# Patient Record
Sex: MALE | Race: WHITE | Employment: FULL TIME | URBAN - METROPOLITAN AREA
[De-identification: names, ages, dates, MRNs, and addresses within clinical notes are randomized per-mention and may not be internally consistent; named-entity substitution may affect disease eponyms.]

---

## 2017-01-01 ENCOUNTER — TELEPHONE (OUTPATIENT)
Dept: PHARMACY | Facility: CLINIC | Age: 47
End: 2017-01-01

## 2017-01-01 ENCOUNTER — CARE COORDINATION (OUTPATIENT)
Dept: ONCOLOGY | Facility: CLINIC | Age: 47
End: 2017-01-01

## 2017-01-01 ENCOUNTER — TELEPHONE (OUTPATIENT)
Dept: ANESTHESIOLOGY | Facility: CLINIC | Age: 47
End: 2017-01-01

## 2017-01-01 ENCOUNTER — OFFICE VISIT (OUTPATIENT)
Dept: NEUROLOGY | Facility: CLINIC | Age: 47
End: 2017-01-01

## 2017-01-01 ENCOUNTER — TELEPHONE (OUTPATIENT)
Dept: NEUROLOGY | Facility: CLINIC | Age: 47
End: 2017-01-01

## 2017-01-01 ENCOUNTER — TELEPHONE (OUTPATIENT)
Dept: ONCOLOGY | Facility: CLINIC | Age: 47
End: 2017-01-01

## 2017-01-01 ENCOUNTER — INFUSION THERAPY VISIT (OUTPATIENT)
Dept: ONCOLOGY | Facility: CLINIC | Age: 47
End: 2017-01-01
Attending: INTERNAL MEDICINE
Payer: COMMERCIAL

## 2017-01-01 ENCOUNTER — TELEPHONE (OUTPATIENT)
Dept: PALLIATIVE CARE | Facility: CLINIC | Age: 47
End: 2017-01-01

## 2017-01-01 ENCOUNTER — APPOINTMENT (OUTPATIENT)
Dept: LAB | Facility: CLINIC | Age: 47
End: 2017-01-01
Attending: INTERNAL MEDICINE
Payer: COMMERCIAL

## 2017-01-01 ENCOUNTER — MEDICAL CORRESPONDENCE (OUTPATIENT)
Dept: HEALTH INFORMATION MANAGEMENT | Facility: CLINIC | Age: 47
End: 2017-01-01

## 2017-01-01 ENCOUNTER — OFFICE VISIT (OUTPATIENT)
Dept: GERIATRICS | Facility: CLINIC | Age: 47
End: 2017-01-01

## 2017-01-01 ENCOUNTER — HOME INFUSION (PRE-WILLOW HOME INFUSION) (OUTPATIENT)
Dept: PHARMACY | Facility: CLINIC | Age: 47
End: 2017-01-01

## 2017-01-01 ENCOUNTER — NURSE TRIAGE (OUTPATIENT)
Dept: NURSING | Facility: CLINIC | Age: 47
End: 2017-01-01

## 2017-01-01 ENCOUNTER — OFFICE VISIT (OUTPATIENT)
Dept: NEUROSURGERY | Facility: CLINIC | Age: 47
End: 2017-01-01
Attending: NEUROLOGICAL SURGERY
Payer: COMMERCIAL

## 2017-01-01 ENCOUNTER — OFFICE VISIT (OUTPATIENT)
Dept: PALLIATIVE CARE | Facility: CLINIC | Age: 47
End: 2017-01-01
Attending: INTERNAL MEDICINE
Payer: COMMERCIAL

## 2017-01-01 ENCOUNTER — HOSPITAL ENCOUNTER (EMERGENCY)
Facility: CLINIC | Age: 47
Discharge: HOME | End: 2017-03-18
Attending: EMERGENCY MEDICINE | Admitting: EMERGENCY MEDICINE
Payer: COMMERCIAL

## 2017-01-01 ENCOUNTER — ONCOLOGY VISIT (OUTPATIENT)
Dept: ONCOLOGY | Facility: CLINIC | Age: 47
End: 2017-01-01
Attending: NURSE PRACTITIONER
Payer: COMMERCIAL

## 2017-01-01 ENCOUNTER — DOCUMENTATION ONLY (OUTPATIENT)
Dept: ONCOLOGY | Facility: CLINIC | Age: 47
End: 2017-01-01

## 2017-01-01 ENCOUNTER — OFFICE VISIT (OUTPATIENT)
Dept: WOUND CARE | Facility: CLINIC | Age: 47
End: 2017-01-01

## 2017-01-01 VITALS
TEMPERATURE: 98.1 F | SYSTOLIC BLOOD PRESSURE: 130 MMHG | DIASTOLIC BLOOD PRESSURE: 85 MMHG | HEIGHT: 72 IN | HEART RATE: 67 BPM | OXYGEN SATURATION: 96 % | WEIGHT: 169 LBS | BODY MASS INDEX: 22.89 KG/M2 | RESPIRATION RATE: 17 BRPM

## 2017-01-01 VITALS
RESPIRATION RATE: 18 BRPM | HEART RATE: 79 BPM | TEMPERATURE: 97.4 F | OXYGEN SATURATION: 97 % | SYSTOLIC BLOOD PRESSURE: 132 MMHG | DIASTOLIC BLOOD PRESSURE: 88 MMHG

## 2017-01-01 VITALS
OXYGEN SATURATION: 99 % | SYSTOLIC BLOOD PRESSURE: 119 MMHG | BODY MASS INDEX: 23.86 KG/M2 | HEART RATE: 80 BPM | WEIGHT: 171.1 LBS | RESPIRATION RATE: 16 BRPM | DIASTOLIC BLOOD PRESSURE: 85 MMHG | TEMPERATURE: 98.1 F

## 2017-01-01 VITALS
OXYGEN SATURATION: 97 % | RESPIRATION RATE: 16 BRPM | HEART RATE: 66 BPM | SYSTOLIC BLOOD PRESSURE: 120 MMHG | HEIGHT: 70 IN | WEIGHT: 170 LBS | TEMPERATURE: 98.4 F | DIASTOLIC BLOOD PRESSURE: 79 MMHG | BODY MASS INDEX: 24.34 KG/M2

## 2017-01-01 VITALS
OXYGEN SATURATION: 100 % | SYSTOLIC BLOOD PRESSURE: 133 MMHG | HEART RATE: 87 BPM | DIASTOLIC BLOOD PRESSURE: 85 MMHG | TEMPERATURE: 97.9 F | RESPIRATION RATE: 16 BRPM | WEIGHT: 176 LBS | BODY MASS INDEX: 24.55 KG/M2

## 2017-01-01 VITALS
SYSTOLIC BLOOD PRESSURE: 141 MMHG | OXYGEN SATURATION: 98 % | DIASTOLIC BLOOD PRESSURE: 94 MMHG | BODY MASS INDEX: 23.91 KG/M2 | TEMPERATURE: 97.8 F | HEART RATE: 92 BPM | WEIGHT: 176.3 LBS | RESPIRATION RATE: 16 BRPM

## 2017-01-01 VITALS
BODY MASS INDEX: 24.39 KG/M2 | HEART RATE: 62 BPM | HEIGHT: 71 IN | SYSTOLIC BLOOD PRESSURE: 112 MMHG | DIASTOLIC BLOOD PRESSURE: 78 MMHG | WEIGHT: 174.2 LBS

## 2017-01-01 VITALS
OXYGEN SATURATION: 97 % | HEART RATE: 66 BPM | SYSTOLIC BLOOD PRESSURE: 120 MMHG | DIASTOLIC BLOOD PRESSURE: 79 MMHG | TEMPERATURE: 98.4 F | BODY MASS INDEX: 24.39 KG/M2 | WEIGHT: 170 LBS | RESPIRATION RATE: 18 BRPM

## 2017-01-01 VITALS
SYSTOLIC BLOOD PRESSURE: 119 MMHG | DIASTOLIC BLOOD PRESSURE: 80 MMHG | WEIGHT: 172 LBS | BODY MASS INDEX: 24.62 KG/M2 | HEIGHT: 70 IN | HEART RATE: 79 BPM

## 2017-01-01 VITALS
BODY MASS INDEX: 23.2 KG/M2 | SYSTOLIC BLOOD PRESSURE: 113 MMHG | HEART RATE: 77 BPM | WEIGHT: 165.7 LBS | DIASTOLIC BLOOD PRESSURE: 78 MMHG | HEIGHT: 71 IN

## 2017-01-01 VITALS
SYSTOLIC BLOOD PRESSURE: 132 MMHG | WEIGHT: 171.8 LBS | HEART RATE: 79 BPM | RESPIRATION RATE: 18 BRPM | OXYGEN SATURATION: 97 % | TEMPERATURE: 97.4 F | DIASTOLIC BLOOD PRESSURE: 88 MMHG | BODY MASS INDEX: 23.97 KG/M2

## 2017-01-01 VITALS
OXYGEN SATURATION: 98 % | SYSTOLIC BLOOD PRESSURE: 122 MMHG | HEART RATE: 79 BPM | RESPIRATION RATE: 16 BRPM | WEIGHT: 170.6 LBS | BODY MASS INDEX: 23.79 KG/M2 | TEMPERATURE: 97.7 F | DIASTOLIC BLOOD PRESSURE: 78 MMHG

## 2017-01-01 VITALS
HEART RATE: 80 BPM | SYSTOLIC BLOOD PRESSURE: 119 MMHG | OXYGEN SATURATION: 99 % | WEIGHT: 171.1 LBS | RESPIRATION RATE: 16 BRPM | TEMPERATURE: 98.1 F | DIASTOLIC BLOOD PRESSURE: 85 MMHG | BODY MASS INDEX: 23.86 KG/M2

## 2017-01-01 VITALS
SYSTOLIC BLOOD PRESSURE: 132 MMHG | WEIGHT: 178 LBS | BODY MASS INDEX: 24.84 KG/M2 | TEMPERATURE: 97.9 F | DIASTOLIC BLOOD PRESSURE: 81 MMHG | OXYGEN SATURATION: 100 % | HEART RATE: 68 BPM | RESPIRATION RATE: 16 BRPM

## 2017-01-01 VITALS
SYSTOLIC BLOOD PRESSURE: 126 MMHG | RESPIRATION RATE: 12 BRPM | HEART RATE: 79 BPM | TEMPERATURE: 97.8 F | OXYGEN SATURATION: 100 % | DIASTOLIC BLOOD PRESSURE: 88 MMHG | HEIGHT: 71 IN

## 2017-01-01 VITALS
TEMPERATURE: 98.2 F | BODY MASS INDEX: 24.1 KG/M2 | DIASTOLIC BLOOD PRESSURE: 81 MMHG | SYSTOLIC BLOOD PRESSURE: 130 MMHG | RESPIRATION RATE: 16 BRPM | OXYGEN SATURATION: 98 % | WEIGHT: 172.7 LBS | HEART RATE: 69 BPM

## 2017-01-01 VITALS
HEART RATE: 75 BPM | SYSTOLIC BLOOD PRESSURE: 130 MMHG | WEIGHT: 169.7 LBS | HEIGHT: 71 IN | TEMPERATURE: 97.5 F | DIASTOLIC BLOOD PRESSURE: 89 MMHG | BODY MASS INDEX: 23.76 KG/M2 | RESPIRATION RATE: 16 BRPM | OXYGEN SATURATION: 98 %

## 2017-01-01 VITALS
BODY MASS INDEX: 24 KG/M2 | DIASTOLIC BLOOD PRESSURE: 83 MMHG | WEIGHT: 172 LBS | HEART RATE: 67 BPM | SYSTOLIC BLOOD PRESSURE: 126 MMHG | OXYGEN SATURATION: 97 % | TEMPERATURE: 97.5 F

## 2017-01-01 VITALS
SYSTOLIC BLOOD PRESSURE: 116 MMHG | WEIGHT: 171.3 LBS | HEART RATE: 74 BPM | BODY MASS INDEX: 23.89 KG/M2 | OXYGEN SATURATION: 100 % | DIASTOLIC BLOOD PRESSURE: 79 MMHG | TEMPERATURE: 97.4 F

## 2017-01-01 VITALS
SYSTOLIC BLOOD PRESSURE: 111 MMHG | RESPIRATION RATE: 18 BRPM | WEIGHT: 175 LBS | DIASTOLIC BLOOD PRESSURE: 76 MMHG | OXYGEN SATURATION: 100 % | HEIGHT: 71 IN | TEMPERATURE: 98 F | BODY MASS INDEX: 24.5 KG/M2 | HEART RATE: 74 BPM

## 2017-01-01 DIAGNOSIS — C78.7 SECONDARY MALIGNANT NEOPLASM OF LIVER (H): ICD-10-CM

## 2017-01-01 DIAGNOSIS — C78.00 MALIGNANT NEOPLASM METASTATIC TO LUNG, UNSPECIFIED LATERALITY (H): Primary | ICD-10-CM

## 2017-01-01 DIAGNOSIS — Z79.899 ENCOUNTER FOR LONG-TERM (CURRENT) USE OF MEDICATIONS: Primary | ICD-10-CM

## 2017-01-01 DIAGNOSIS — R51.9 NONINTRACTABLE EPISODIC HEADACHE, UNSPECIFIED HEADACHE TYPE: ICD-10-CM

## 2017-01-01 DIAGNOSIS — M25.511 RIGHT SHOULDER PAIN, UNSPECIFIED CHRONICITY: Primary | ICD-10-CM

## 2017-01-01 DIAGNOSIS — C79.31 METASTASIS TO BRAIN (H): ICD-10-CM

## 2017-01-01 DIAGNOSIS — C78.00 MALIGNANT NEOPLASM METASTATIC TO LUNG, UNSPECIFIED LATERALITY (H): ICD-10-CM

## 2017-01-01 DIAGNOSIS — C20 RECTAL CANCER (H): ICD-10-CM

## 2017-01-01 DIAGNOSIS — C78.00 METASTASIS TO LUNG (H): ICD-10-CM

## 2017-01-01 DIAGNOSIS — G40.109 LOCALIZATION-RELATED FOCAL EPILEPSY WITH SIMPLE PARTIAL SEIZURES (H): ICD-10-CM

## 2017-01-01 DIAGNOSIS — R42 VERTIGO: ICD-10-CM

## 2017-01-01 DIAGNOSIS — Z79.899 ENCOUNTER FOR LONG-TERM (CURRENT) USE OF MEDICATIONS: ICD-10-CM

## 2017-01-01 DIAGNOSIS — C79.31 METASTASIS TO BRAIN (H): Primary | ICD-10-CM

## 2017-01-01 DIAGNOSIS — G40.109 LOCALIZATION-RELATED FOCAL EPILEPSY WITH SIMPLE PARTIAL SEIZURES (H): Primary | ICD-10-CM

## 2017-01-01 DIAGNOSIS — M25.511 CHRONIC RIGHT SHOULDER PAIN: Primary | ICD-10-CM

## 2017-01-01 DIAGNOSIS — M25.511 ACUTE PAIN OF RIGHT SHOULDER: Primary | ICD-10-CM

## 2017-01-01 DIAGNOSIS — C78.7 SECONDARY MALIGNANT NEOPLASM OF LIVER (H): Primary | ICD-10-CM

## 2017-01-01 DIAGNOSIS — C20 RECTAL CANCER (H): Primary | ICD-10-CM

## 2017-01-01 DIAGNOSIS — G40.109 SEIZURE DISORDER, FOCAL MOTOR (H): Primary | ICD-10-CM

## 2017-01-01 DIAGNOSIS — Z71.89 OSTOMY NURSE CONSULTATION: Primary | ICD-10-CM

## 2017-01-01 DIAGNOSIS — C78.01 MALIGNANT NEOPLASM METASTATIC TO BOTH LUNGS (H): ICD-10-CM

## 2017-01-01 DIAGNOSIS — C78.02 MALIGNANT NEOPLASM METASTATIC TO BOTH LUNGS (H): ICD-10-CM

## 2017-01-01 DIAGNOSIS — Z71.89 OSTOMY NURSE CONSULTATION: ICD-10-CM

## 2017-01-01 DIAGNOSIS — G89.29 CHRONIC RIGHT SHOULDER PAIN: Primary | ICD-10-CM

## 2017-01-01 LAB
ALBUMIN SERPL-MCNC: 3.4 G/DL (ref 3.4–5)
ALBUMIN SERPL-MCNC: 3.5 G/DL (ref 3.4–5)
ALBUMIN SERPL-MCNC: 3.6 G/DL (ref 3.4–5)
ALBUMIN SERPL-MCNC: 3.6 G/DL (ref 3.4–5)
ALBUMIN SERPL-MCNC: 3.7 G/DL (ref 3.4–5)
ALBUMIN SERPL-MCNC: 3.8 G/DL (ref 3.4–5)
ALBUMIN SERPL-MCNC: 3.9 G/DL (ref 3.4–5)
ALBUMIN SERPL-MCNC: 3.9 G/DL (ref 3.4–5)
ALBUMIN SERPL-MCNC: 4 G/DL (ref 3.4–5)
ALBUMIN SERPL-MCNC: 4.1 G/DL (ref 3.4–5)
ALBUMIN UR-MCNC: 10 MG/DL
ALBUMIN UR-MCNC: 30 MG/DL
ALBUMIN UR-MCNC: 30 MG/DL
ALBUMIN UR-MCNC: NEGATIVE MG/DL
ALP SERPL-CCNC: 100 U/L (ref 40–150)
ALP SERPL-CCNC: 105 U/L (ref 40–150)
ALP SERPL-CCNC: 105 U/L (ref 40–150)
ALP SERPL-CCNC: 106 U/L (ref 40–150)
ALP SERPL-CCNC: 108 U/L (ref 40–150)
ALP SERPL-CCNC: 110 U/L (ref 40–150)
ALP SERPL-CCNC: 111 U/L (ref 40–150)
ALP SERPL-CCNC: 112 U/L (ref 40–150)
ALP SERPL-CCNC: 113 U/L (ref 40–150)
ALP SERPL-CCNC: 114 U/L (ref 40–150)
ALP SERPL-CCNC: 117 U/L (ref 40–150)
ALP SERPL-CCNC: 117 U/L (ref 40–150)
ALP SERPL-CCNC: 121 U/L (ref 40–150)
ALP SERPL-CCNC: 121 U/L (ref 40–150)
ALP SERPL-CCNC: 123 U/L (ref 40–150)
ALP SERPL-CCNC: 125 U/L (ref 40–150)
ALP SERPL-CCNC: 126 U/L (ref 40–150)
ALP SERPL-CCNC: 149 U/L (ref 40–150)
ALT SERPL W P-5'-P-CCNC: 16 U/L (ref 0–70)
ALT SERPL W P-5'-P-CCNC: 16 U/L (ref 0–70)
ALT SERPL W P-5'-P-CCNC: 18 U/L (ref 0–70)
ALT SERPL W P-5'-P-CCNC: 18 U/L (ref 0–70)
ALT SERPL W P-5'-P-CCNC: 20 U/L (ref 0–70)
ALT SERPL W P-5'-P-CCNC: 22 U/L (ref 0–70)
ALT SERPL W P-5'-P-CCNC: 22 U/L (ref 0–70)
ALT SERPL W P-5'-P-CCNC: 23 U/L (ref 0–70)
ALT SERPL W P-5'-P-CCNC: 30 U/L (ref 0–70)
ALT SERPL W P-5'-P-CCNC: 34 U/L (ref 0–70)
ALT SERPL W P-5'-P-CCNC: 40 U/L (ref 0–70)
ALT SERPL W P-5'-P-CCNC: 47 U/L (ref 0–70)
ANION GAP SERPL CALCULATED.3IONS-SCNC: 5 MMOL/L (ref 3–14)
ANION GAP SERPL CALCULATED.3IONS-SCNC: 6 MMOL/L (ref 3–14)
ANION GAP SERPL CALCULATED.3IONS-SCNC: 6 MMOL/L (ref 3–14)
ANION GAP SERPL CALCULATED.3IONS-SCNC: 7 MMOL/L (ref 3–14)
ANION GAP SERPL CALCULATED.3IONS-SCNC: 8 MMOL/L (ref 3–14)
ANION GAP SERPL CALCULATED.3IONS-SCNC: 9 MMOL/L (ref 3–14)
ANISOCYTOSIS BLD QL SMEAR: SLIGHT
AST SERPL W P-5'-P-CCNC: 19 U/L (ref 0–45)
AST SERPL W P-5'-P-CCNC: 20 U/L (ref 0–45)
AST SERPL W P-5'-P-CCNC: 21 U/L (ref 0–45)
AST SERPL W P-5'-P-CCNC: 22 U/L (ref 0–45)
AST SERPL W P-5'-P-CCNC: 24 U/L (ref 0–45)
AST SERPL W P-5'-P-CCNC: 24 U/L (ref 0–45)
AST SERPL W P-5'-P-CCNC: 25 U/L (ref 0–45)
AST SERPL W P-5'-P-CCNC: 26 U/L (ref 0–45)
AST SERPL W P-5'-P-CCNC: 27 U/L (ref 0–45)
AST SERPL W P-5'-P-CCNC: 28 U/L (ref 0–45)
AST SERPL W P-5'-P-CCNC: 28 U/L (ref 0–45)
AST SERPL W P-5'-P-CCNC: 30 U/L (ref 0–45)
AST SERPL W P-5'-P-CCNC: 39 U/L (ref 0–45)
AST SERPL W P-5'-P-CCNC: 43 U/L (ref 0–45)
BASOPHILS # BLD AUTO: 0 10E9/L (ref 0–0.2)
BASOPHILS # BLD AUTO: 0.1 10E9/L (ref 0–0.2)
BASOPHILS NFR BLD AUTO: 0 %
BASOPHILS NFR BLD AUTO: 0.3 %
BASOPHILS NFR BLD AUTO: 0.3 %
BASOPHILS NFR BLD AUTO: 0.4 %
BASOPHILS NFR BLD AUTO: 0.5 %
BASOPHILS NFR BLD AUTO: 0.6 %
BASOPHILS NFR BLD AUTO: 0.6 %
BASOPHILS NFR BLD AUTO: 0.7 %
BASOPHILS NFR BLD AUTO: 0.7 %
BASOPHILS NFR BLD AUTO: 0.8 %
BASOPHILS NFR BLD AUTO: 0.9 %
BASOPHILS NFR BLD AUTO: 1 %
BILIRUB SERPL-MCNC: 0.4 MG/DL (ref 0.2–1.3)
BILIRUB SERPL-MCNC: 0.5 MG/DL (ref 0.2–1.3)
BILIRUB SERPL-MCNC: 0.6 MG/DL (ref 0.2–1.3)
BILIRUB SERPL-MCNC: 0.7 MG/DL (ref 0.2–1.3)
BILIRUB SERPL-MCNC: 0.8 MG/DL (ref 0.2–1.3)
BILIRUB SERPL-MCNC: 0.9 MG/DL (ref 0.2–1.3)
BILIRUB SERPL-MCNC: 1.1 MG/DL (ref 0.2–1.3)
BILIRUB SERPL-MCNC: 1.4 MG/DL (ref 0.2–1.3)
BILIRUB SERPL-MCNC: 1.5 MG/DL (ref 0.2–1.3)
BUN SERPL-MCNC: 10 MG/DL (ref 7–30)
BUN SERPL-MCNC: 12 MG/DL (ref 7–30)
BUN SERPL-MCNC: 13 MG/DL (ref 7–30)
BUN SERPL-MCNC: 14 MG/DL (ref 7–30)
BUN SERPL-MCNC: 14 MG/DL (ref 7–30)
BUN SERPL-MCNC: 16 MG/DL (ref 7–30)
BUN SERPL-MCNC: 17 MG/DL (ref 7–30)
BUN SERPL-MCNC: 18 MG/DL (ref 7–30)
BUN SERPL-MCNC: 19 MG/DL (ref 7–30)
CALCIUM SERPL-MCNC: 8.4 MG/DL (ref 8.5–10.1)
CALCIUM SERPL-MCNC: 8.5 MG/DL (ref 8.5–10.1)
CALCIUM SERPL-MCNC: 8.6 MG/DL (ref 8.5–10.1)
CALCIUM SERPL-MCNC: 8.7 MG/DL (ref 8.5–10.1)
CALCIUM SERPL-MCNC: 8.9 MG/DL (ref 8.5–10.1)
CALCIUM SERPL-MCNC: 8.9 MG/DL (ref 8.5–10.1)
CALCIUM SERPL-MCNC: 9 MG/DL (ref 8.5–10.1)
CALCIUM SERPL-MCNC: 9.1 MG/DL (ref 8.5–10.1)
CALCIUM SERPL-MCNC: 9.1 MG/DL (ref 8.5–10.1)
CEA SERPL-MCNC: 132.9 UG/L (ref 0–2.5)
CEA SERPL-MCNC: 179.1 UG/L (ref 0–2.5)
CEA SERPL-MCNC: 2 UG/L (ref 0–2.5)
CEA SERPL-MCNC: 2.2 UG/L (ref 0–2.5)
CEA SERPL-MCNC: 4.1 UG/L (ref 0–2.5)
CHLORIDE SERPL-SCNC: 101 MMOL/L (ref 94–109)
CHLORIDE SERPL-SCNC: 102 MMOL/L (ref 94–109)
CHLORIDE SERPL-SCNC: 102 MMOL/L (ref 94–109)
CHLORIDE SERPL-SCNC: 103 MMOL/L (ref 94–109)
CHLORIDE SERPL-SCNC: 104 MMOL/L (ref 94–109)
CHLORIDE SERPL-SCNC: 105 MMOL/L (ref 94–109)
CHLORIDE SERPL-SCNC: 106 MMOL/L (ref 94–109)
CHLORIDE SERPL-SCNC: 106 MMOL/L (ref 94–109)
CHLORIDE SERPL-SCNC: 107 MMOL/L (ref 94–109)
CHLORIDE SERPL-SCNC: 108 MMOL/L (ref 94–109)
CHLORIDE SERPL-SCNC: 108 MMOL/L (ref 94–109)
CO2 SERPL-SCNC: 25 MMOL/L (ref 20–32)
CO2 SERPL-SCNC: 26 MMOL/L (ref 20–32)
CO2 SERPL-SCNC: 27 MMOL/L (ref 20–32)
CO2 SERPL-SCNC: 28 MMOL/L (ref 20–32)
CO2 SERPL-SCNC: 29 MMOL/L (ref 20–32)
CREAT SERPL-MCNC: 0.73 MG/DL (ref 0.66–1.25)
CREAT SERPL-MCNC: 0.73 MG/DL (ref 0.66–1.25)
CREAT SERPL-MCNC: 0.78 MG/DL (ref 0.66–1.25)
CREAT SERPL-MCNC: 0.78 MG/DL (ref 0.66–1.25)
CREAT SERPL-MCNC: 0.81 MG/DL (ref 0.66–1.25)
CREAT SERPL-MCNC: 0.81 MG/DL (ref 0.66–1.25)
CREAT SERPL-MCNC: 0.82 MG/DL (ref 0.66–1.25)
CREAT SERPL-MCNC: 0.85 MG/DL (ref 0.66–1.25)
CREAT SERPL-MCNC: 0.86 MG/DL (ref 0.66–1.25)
CREAT SERPL-MCNC: 0.87 MG/DL (ref 0.66–1.25)
CREAT SERPL-MCNC: 0.9 MG/DL (ref 0.66–1.25)
CREAT SERPL-MCNC: 0.91 MG/DL (ref 0.66–1.25)
CREAT SERPL-MCNC: 0.92 MG/DL (ref 0.66–1.25)
CREAT SERPL-MCNC: 0.93 MG/DL (ref 0.66–1.25)
CREAT SERPL-MCNC: 0.96 MG/DL (ref 0.66–1.25)
CREAT SERPL-MCNC: 0.98 MG/DL (ref 0.66–1.25)
CREAT SERPL-MCNC: 0.98 MG/DL (ref 0.66–1.25)
CREAT SERPL-MCNC: 1 MG/DL (ref 0.66–1.25)
DESMETHYLLACOSAMIDE: 1.8
DESMETHYLLACOSAMIDE: 2.2
DIFFERENTIAL METHOD BLD: ABNORMAL
DIFFERENTIAL METHOD BLD: NORMAL
EOSINOPHIL # BLD AUTO: 0 10E9/L (ref 0–0.7)
EOSINOPHIL # BLD AUTO: 0.1 10E9/L (ref 0–0.7)
EOSINOPHIL # BLD AUTO: 0.2 10E9/L (ref 0–0.7)
EOSINOPHIL # BLD AUTO: 0.2 10E9/L (ref 0–0.7)
EOSINOPHIL # BLD AUTO: 0.3 10E9/L (ref 0–0.7)
EOSINOPHIL # BLD AUTO: 0.4 10E9/L (ref 0–0.7)
EOSINOPHIL # BLD AUTO: 0.6 10E9/L (ref 0–0.7)
EOSINOPHIL # BLD AUTO: 0.9 10E9/L (ref 0–0.7)
EOSINOPHIL NFR BLD AUTO: 0.3 %
EOSINOPHIL NFR BLD AUTO: 0.6 %
EOSINOPHIL NFR BLD AUTO: 0.9 %
EOSINOPHIL NFR BLD AUTO: 1 %
EOSINOPHIL NFR BLD AUTO: 1.3 %
EOSINOPHIL NFR BLD AUTO: 1.5 %
EOSINOPHIL NFR BLD AUTO: 1.7 %
EOSINOPHIL NFR BLD AUTO: 1.7 %
EOSINOPHIL NFR BLD AUTO: 12.9 %
EOSINOPHIL NFR BLD AUTO: 2.6 %
EOSINOPHIL NFR BLD AUTO: 2.8 %
EOSINOPHIL NFR BLD AUTO: 2.9 %
EOSINOPHIL NFR BLD AUTO: 3 %
EOSINOPHIL NFR BLD AUTO: 3.5 %
EOSINOPHIL NFR BLD AUTO: 3.9 %
EOSINOPHIL NFR BLD AUTO: 4.5 %
EOSINOPHIL NFR BLD AUTO: 5.3 %
EOSINOPHIL NFR BLD AUTO: 7.8 %
ERYTHROCYTE [DISTWIDTH] IN BLOOD BY AUTOMATED COUNT: 11.7 % (ref 10–15)
ERYTHROCYTE [DISTWIDTH] IN BLOOD BY AUTOMATED COUNT: 12.3 % (ref 10–15)
ERYTHROCYTE [DISTWIDTH] IN BLOOD BY AUTOMATED COUNT: 12.6 % (ref 10–15)
ERYTHROCYTE [DISTWIDTH] IN BLOOD BY AUTOMATED COUNT: 12.7 % (ref 10–15)
ERYTHROCYTE [DISTWIDTH] IN BLOOD BY AUTOMATED COUNT: 12.9 % (ref 10–15)
ERYTHROCYTE [DISTWIDTH] IN BLOOD BY AUTOMATED COUNT: 14.6 % (ref 10–15)
ERYTHROCYTE [DISTWIDTH] IN BLOOD BY AUTOMATED COUNT: 15.7 % (ref 10–15)
ERYTHROCYTE [DISTWIDTH] IN BLOOD BY AUTOMATED COUNT: 15.8 % (ref 10–15)
ERYTHROCYTE [DISTWIDTH] IN BLOOD BY AUTOMATED COUNT: 15.8 % (ref 10–15)
ERYTHROCYTE [DISTWIDTH] IN BLOOD BY AUTOMATED COUNT: 15.9 % (ref 10–15)
ERYTHROCYTE [DISTWIDTH] IN BLOOD BY AUTOMATED COUNT: 16.4 % (ref 10–15)
ERYTHROCYTE [DISTWIDTH] IN BLOOD BY AUTOMATED COUNT: 16.4 % (ref 10–15)
ERYTHROCYTE [DISTWIDTH] IN BLOOD BY AUTOMATED COUNT: 16.5 % (ref 10–15)
ERYTHROCYTE [DISTWIDTH] IN BLOOD BY AUTOMATED COUNT: 16.9 % (ref 10–15)
ERYTHROCYTE [DISTWIDTH] IN BLOOD BY AUTOMATED COUNT: 17 % (ref 10–15)
ERYTHROCYTE [DISTWIDTH] IN BLOOD BY AUTOMATED COUNT: 17.1 % (ref 10–15)
ERYTHROCYTE [DISTWIDTH] IN BLOOD BY AUTOMATED COUNT: 17.5 % (ref 10–15)
ERYTHROCYTE [DISTWIDTH] IN BLOOD BY AUTOMATED COUNT: 18 % (ref 10–15)
GFR SERPL CREATININE-BSD FRML MDRD: 80 ML/MIN/1.7M2
GFR SERPL CREATININE-BSD FRML MDRD: 82 ML/MIN/1.7M2
GFR SERPL CREATININE-BSD FRML MDRD: 82 ML/MIN/1.7M2
GFR SERPL CREATININE-BSD FRML MDRD: 84 ML/MIN/1.7M2
GFR SERPL CREATININE-BSD FRML MDRD: 88 ML/MIN/1.7M2
GFR SERPL CREATININE-BSD FRML MDRD: 88 ML/MIN/1.7M2
GFR SERPL CREATININE-BSD FRML MDRD: 90 ML/MIN/1.7M2
GFR SERPL CREATININE-BSD FRML MDRD: >90 ML/MIN/1.7M2
GFR SERPL CREATININE-BSD FRML MDRD: >90 ML/MIN/1.7M2
GFR SERPL CREATININE-BSD FRML MDRD: ABNORMAL ML/MIN/1.7M2
GFR SERPL CREATININE-BSD FRML MDRD: NORMAL ML/MIN/1.7M2
GLUCOSE SERPL-MCNC: 100 MG/DL (ref 70–99)
GLUCOSE SERPL-MCNC: 101 MG/DL (ref 70–99)
GLUCOSE SERPL-MCNC: 79 MG/DL (ref 70–99)
GLUCOSE SERPL-MCNC: 83 MG/DL (ref 70–99)
GLUCOSE SERPL-MCNC: 83 MG/DL (ref 70–99)
GLUCOSE SERPL-MCNC: 84 MG/DL (ref 70–99)
GLUCOSE SERPL-MCNC: 85 MG/DL (ref 70–99)
GLUCOSE SERPL-MCNC: 86 MG/DL (ref 70–99)
GLUCOSE SERPL-MCNC: 88 MG/DL (ref 70–99)
GLUCOSE SERPL-MCNC: 90 MG/DL (ref 70–99)
GLUCOSE SERPL-MCNC: 92 MG/DL (ref 70–99)
GLUCOSE SERPL-MCNC: 94 MG/DL (ref 70–99)
GLUCOSE SERPL-MCNC: 96 MG/DL (ref 70–99)
GLUCOSE SERPL-MCNC: 97 MG/DL (ref 70–99)
GLUCOSE SERPL-MCNC: 99 MG/DL (ref 70–99)
GLUCOSE SERPL-MCNC: 99 MG/DL (ref 70–99)
HCT VFR BLD AUTO: 32 % (ref 40–53)
HCT VFR BLD AUTO: 33.6 % (ref 40–53)
HCT VFR BLD AUTO: 33.8 % (ref 40–53)
HCT VFR BLD AUTO: 35.2 % (ref 40–53)
HCT VFR BLD AUTO: 35.7 % (ref 40–53)
HCT VFR BLD AUTO: 37.5 % (ref 40–53)
HCT VFR BLD AUTO: 38.4 % (ref 40–53)
HCT VFR BLD AUTO: 38.7 % (ref 40–53)
HCT VFR BLD AUTO: 38.7 % (ref 40–53)
HCT VFR BLD AUTO: 39.4 % (ref 40–53)
HCT VFR BLD AUTO: 39.6 % (ref 40–53)
HCT VFR BLD AUTO: 40.2 % (ref 40–53)
HCT VFR BLD AUTO: 40.7 % (ref 40–53)
HCT VFR BLD AUTO: 40.8 % (ref 40–53)
HCT VFR BLD AUTO: 41.2 % (ref 40–53)
HCT VFR BLD AUTO: 41.3 % (ref 40–53)
HCT VFR BLD AUTO: 44.6 % (ref 40–53)
HCT VFR BLD AUTO: 48.5 % (ref 40–53)
HGB BLD-MCNC: 11.3 G/DL (ref 13.3–17.7)
HGB BLD-MCNC: 11.3 G/DL (ref 13.3–17.7)
HGB BLD-MCNC: 11.9 G/DL (ref 13.3–17.7)
HGB BLD-MCNC: 12.5 G/DL (ref 13.3–17.7)
HGB BLD-MCNC: 12.9 G/DL (ref 13.3–17.7)
HGB BLD-MCNC: 12.9 G/DL (ref 13.3–17.7)
HGB BLD-MCNC: 13.4 G/DL (ref 13.3–17.7)
HGB BLD-MCNC: 13.6 G/DL (ref 13.3–17.7)
HGB BLD-MCNC: 13.9 G/DL (ref 13.3–17.7)
HGB BLD-MCNC: 14.1 G/DL (ref 13.3–17.7)
HGB BLD-MCNC: 14.2 G/DL (ref 13.3–17.7)
HGB BLD-MCNC: 14.3 G/DL (ref 13.3–17.7)
HGB BLD-MCNC: 14.5 G/DL (ref 13.3–17.7)
HGB BLD-MCNC: 14.5 G/DL (ref 13.3–17.7)
HGB BLD-MCNC: 14.6 G/DL (ref 13.3–17.7)
HGB BLD-MCNC: 14.6 G/DL (ref 13.3–17.7)
HGB BLD-MCNC: 15.4 G/DL (ref 13.3–17.7)
HGB BLD-MCNC: 17.3 G/DL (ref 13.3–17.7)
IMM GRANULOCYTES # BLD: 0 10E9/L (ref 0–0.4)
IMM GRANULOCYTES # BLD: 0.1 10E9/L (ref 0–0.4)
IMM GRANULOCYTES NFR BLD: 0 %
IMM GRANULOCYTES NFR BLD: 0.2 %
IMM GRANULOCYTES NFR BLD: 0.3 %
IMM GRANULOCYTES NFR BLD: 0.3 %
IMM GRANULOCYTES NFR BLD: 0.4 %
IMM GRANULOCYTES NFR BLD: 0.5 %
IMM GRANULOCYTES NFR BLD: 0.6 %
IMM GRANULOCYTES NFR BLD: 0.7 %
IMM GRANULOCYTES NFR BLD: 0.7 %
IMM GRANULOCYTES NFR BLD: 0.8 %
IMM GRANULOCYTES NFR BLD: 0.8 %
IMM GRANULOCYTES NFR BLD: 0.9 %
IMM GRANULOCYTES NFR BLD: 1.5 %
INTERPRETATION ECG - MUSE: NORMAL
LACOSAMIDE BLD-MCNC: 13.3 UG/ML
LACOSAMIDE BLD-MCNC: 13.7 UG/ML
LEVETIRACETAM SERPL-MCNC: 45.2 UG/ML
LEVETIRACETAM SERPL-MCNC: 80.6 UG/ML
LYMPHOCYTES # BLD AUTO: 0.3 10E9/L (ref 0.8–5.3)
LYMPHOCYTES # BLD AUTO: 0.4 10E9/L (ref 0.8–5.3)
LYMPHOCYTES # BLD AUTO: 0.4 10E9/L (ref 0.8–5.3)
LYMPHOCYTES # BLD AUTO: 0.5 10E9/L (ref 0.8–5.3)
LYMPHOCYTES # BLD AUTO: 0.6 10E9/L (ref 0.8–5.3)
LYMPHOCYTES # BLD AUTO: 0.7 10E9/L (ref 0.8–5.3)
LYMPHOCYTES # BLD AUTO: 0.8 10E9/L (ref 0.8–5.3)
LYMPHOCYTES NFR BLD AUTO: 10.3 %
LYMPHOCYTES NFR BLD AUTO: 14.1 %
LYMPHOCYTES NFR BLD AUTO: 14.6 %
LYMPHOCYTES NFR BLD AUTO: 15.9 %
LYMPHOCYTES NFR BLD AUTO: 16 %
LYMPHOCYTES NFR BLD AUTO: 17.2 %
LYMPHOCYTES NFR BLD AUTO: 21.9 %
LYMPHOCYTES NFR BLD AUTO: 22 %
LYMPHOCYTES NFR BLD AUTO: 22.6 %
LYMPHOCYTES NFR BLD AUTO: 22.8 %
LYMPHOCYTES NFR BLD AUTO: 23 %
LYMPHOCYTES NFR BLD AUTO: 24.6 %
LYMPHOCYTES NFR BLD AUTO: 27.8 %
LYMPHOCYTES NFR BLD AUTO: 31.9 %
LYMPHOCYTES NFR BLD AUTO: 7 %
LYMPHOCYTES NFR BLD AUTO: 8.4 %
LYMPHOCYTES NFR BLD AUTO: 9.3 %
LYMPHOCYTES NFR BLD AUTO: 9.3 %
MACROCYTES BLD QL SMEAR: PRESENT
MCH RBC QN AUTO: 32.9 PG (ref 26.5–33)
MCH RBC QN AUTO: 33 PG (ref 26.5–33)
MCH RBC QN AUTO: 33.6 PG (ref 26.5–33)
MCH RBC QN AUTO: 33.7 PG (ref 26.5–33)
MCH RBC QN AUTO: 34 PG (ref 26.5–33)
MCH RBC QN AUTO: 34.5 PG (ref 26.5–33)
MCH RBC QN AUTO: 34.6 PG (ref 26.5–33)
MCH RBC QN AUTO: 34.7 PG (ref 26.5–33)
MCH RBC QN AUTO: 34.8 PG (ref 26.5–33)
MCH RBC QN AUTO: 35 PG (ref 26.5–33)
MCH RBC QN AUTO: 35.3 PG (ref 26.5–33)
MCH RBC QN AUTO: 35.4 PG (ref 26.5–33)
MCH RBC QN AUTO: 35.5 PG (ref 26.5–33)
MCH RBC QN AUTO: 35.9 PG (ref 26.5–33)
MCH RBC QN AUTO: 35.9 PG (ref 26.5–33)
MCH RBC QN AUTO: 36 PG (ref 26.5–33)
MCH RBC QN AUTO: 36.2 PG (ref 26.5–33)
MCH RBC QN AUTO: 36.2 PG (ref 26.5–33)
MCHC RBC AUTO-ENTMCNC: 33.6 G/DL (ref 31.5–36.5)
MCHC RBC AUTO-ENTMCNC: 34.4 G/DL (ref 31.5–36.5)
MCHC RBC AUTO-ENTMCNC: 34.5 G/DL (ref 31.5–36.5)
MCHC RBC AUTO-ENTMCNC: 34.5 G/DL (ref 31.5–36.5)
MCHC RBC AUTO-ENTMCNC: 34.6 G/DL (ref 31.5–36.5)
MCHC RBC AUTO-ENTMCNC: 34.9 G/DL (ref 31.5–36.5)
MCHC RBC AUTO-ENTMCNC: 35.1 G/DL (ref 31.5–36.5)
MCHC RBC AUTO-ENTMCNC: 35.2 G/DL (ref 31.5–36.5)
MCHC RBC AUTO-ENTMCNC: 35.3 G/DL (ref 31.5–36.5)
MCHC RBC AUTO-ENTMCNC: 35.4 G/DL (ref 31.5–36.5)
MCHC RBC AUTO-ENTMCNC: 35.5 G/DL (ref 31.5–36.5)
MCHC RBC AUTO-ENTMCNC: 35.5 G/DL (ref 31.5–36.5)
MCHC RBC AUTO-ENTMCNC: 35.7 G/DL (ref 31.5–36.5)
MCHC RBC AUTO-ENTMCNC: 35.9 G/DL (ref 31.5–36.5)
MCHC RBC AUTO-ENTMCNC: 36.1 G/DL (ref 31.5–36.5)
MCHC RBC AUTO-ENTMCNC: 36.1 G/DL (ref 31.5–36.5)
MCHC RBC AUTO-ENTMCNC: 36.3 G/DL (ref 31.5–36.5)
MCHC RBC AUTO-ENTMCNC: 36.8 G/DL (ref 31.5–36.5)
MCV RBC AUTO: 101 FL (ref 78–100)
MCV RBC AUTO: 102 FL (ref 78–100)
MCV RBC AUTO: 103 FL (ref 78–100)
MCV RBC AUTO: 104 FL (ref 78–100)
MCV RBC AUTO: 105 FL (ref 78–100)
MCV RBC AUTO: 89 FL (ref 78–100)
MCV RBC AUTO: 93 FL (ref 78–100)
MCV RBC AUTO: 96 FL (ref 78–100)
MCV RBC AUTO: 97 FL (ref 78–100)
MCV RBC AUTO: 98 FL (ref 78–100)
MONOCYTES # BLD AUTO: 0.1 10E9/L (ref 0–1.3)
MONOCYTES # BLD AUTO: 0.2 10E9/L (ref 0–1.3)
MONOCYTES # BLD AUTO: 0.3 10E9/L (ref 0–1.3)
MONOCYTES # BLD AUTO: 0.4 10E9/L (ref 0–1.3)
MONOCYTES # BLD AUTO: 0.5 10E9/L (ref 0–1.3)
MONOCYTES NFR BLD AUTO: 10 %
MONOCYTES NFR BLD AUTO: 14.1 %
MONOCYTES NFR BLD AUTO: 14.7 %
MONOCYTES NFR BLD AUTO: 14.7 %
MONOCYTES NFR BLD AUTO: 17.5 %
MONOCYTES NFR BLD AUTO: 18.6 %
MONOCYTES NFR BLD AUTO: 20.5 %
MONOCYTES NFR BLD AUTO: 26.4 %
MONOCYTES NFR BLD AUTO: 5 %
MONOCYTES NFR BLD AUTO: 5.1 %
MONOCYTES NFR BLD AUTO: 5.7 %
MONOCYTES NFR BLD AUTO: 6.1 %
MONOCYTES NFR BLD AUTO: 6.6 %
MONOCYTES NFR BLD AUTO: 7 %
MONOCYTES NFR BLD AUTO: 7.9 %
MONOCYTES NFR BLD AUTO: 8.1 %
MONOCYTES NFR BLD AUTO: 8.5 %
MONOCYTES NFR BLD AUTO: 9.1 %
NEUTROPHILS # BLD AUTO: 0.6 10E9/L (ref 1.6–8.3)
NEUTROPHILS # BLD AUTO: 1.1 10E9/L (ref 1.6–8.3)
NEUTROPHILS # BLD AUTO: 1.1 10E9/L (ref 1.6–8.3)
NEUTROPHILS # BLD AUTO: 1.3 10E9/L (ref 1.6–8.3)
NEUTROPHILS # BLD AUTO: 1.3 10E9/L (ref 1.6–8.3)
NEUTROPHILS # BLD AUTO: 1.6 10E9/L (ref 1.6–8.3)
NEUTROPHILS # BLD AUTO: 1.7 10E9/L (ref 1.6–8.3)
NEUTROPHILS # BLD AUTO: 1.8 10E9/L (ref 1.6–8.3)
NEUTROPHILS # BLD AUTO: 1.9 10E9/L (ref 1.6–8.3)
NEUTROPHILS # BLD AUTO: 2.3 10E9/L (ref 1.6–8.3)
NEUTROPHILS # BLD AUTO: 2.8 10E9/L (ref 1.6–8.3)
NEUTROPHILS # BLD AUTO: 3.1 10E9/L (ref 1.6–8.3)
NEUTROPHILS # BLD AUTO: 3.8 10E9/L (ref 1.6–8.3)
NEUTROPHILS # BLD AUTO: 5 10E9/L (ref 1.6–8.3)
NEUTROPHILS # BLD AUTO: 5.3 10E9/L (ref 1.6–8.3)
NEUTROPHILS # BLD AUTO: 6.1 10E9/L (ref 1.6–8.3)
NEUTROPHILS # BLD AUTO: 6.5 10E9/L (ref 1.6–8.3)
NEUTROPHILS # BLD AUTO: 6.8 10E9/L (ref 1.6–8.3)
NEUTROPHILS NFR BLD AUTO: 38.2 %
NEUTROPHILS NFR BLD AUTO: 53.4 %
NEUTROPHILS NFR BLD AUTO: 53.5 %
NEUTROPHILS NFR BLD AUTO: 54.5 %
NEUTROPHILS NFR BLD AUTO: 57.2 %
NEUTROPHILS NFR BLD AUTO: 61.4 %
NEUTROPHILS NFR BLD AUTO: 63.9 %
NEUTROPHILS NFR BLD AUTO: 67.6 %
NEUTROPHILS NFR BLD AUTO: 68.6 %
NEUTROPHILS NFR BLD AUTO: 69.2 %
NEUTROPHILS NFR BLD AUTO: 72.7 %
NEUTROPHILS NFR BLD AUTO: 73.4 %
NEUTROPHILS NFR BLD AUTO: 74.3 %
NEUTROPHILS NFR BLD AUTO: 74.6 %
NEUTROPHILS NFR BLD AUTO: 75 %
NEUTROPHILS NFR BLD AUTO: 79.3 %
NEUTROPHILS NFR BLD AUTO: 80.7 %
NEUTROPHILS NFR BLD AUTO: 81 %
NRBC # BLD AUTO: 0 10*3/UL
NRBC BLD AUTO-RTO: 0 /100
OVALOCYTES BLD QL SMEAR: SLIGHT
PLATELET # BLD AUTO: 109 10E9/L (ref 150–450)
PLATELET # BLD AUTO: 116 10E9/L (ref 150–450)
PLATELET # BLD AUTO: 123 10E9/L (ref 150–450)
PLATELET # BLD AUTO: 129 10E9/L (ref 150–450)
PLATELET # BLD AUTO: 142 10E9/L (ref 150–450)
PLATELET # BLD AUTO: 147 10E9/L (ref 150–450)
PLATELET # BLD AUTO: 149 10E9/L (ref 150–450)
PLATELET # BLD AUTO: 151 10E9/L (ref 150–450)
PLATELET # BLD AUTO: 165 10E9/L (ref 150–450)
PLATELET # BLD AUTO: 165 10E9/L (ref 150–450)
PLATELET # BLD AUTO: 167 10E9/L (ref 150–450)
PLATELET # BLD AUTO: 176 10E9/L (ref 150–450)
PLATELET # BLD AUTO: 185 10E9/L (ref 150–450)
PLATELET # BLD AUTO: 188 10E9/L (ref 150–450)
PLATELET # BLD AUTO: 200 10E9/L (ref 150–450)
PLATELET # BLD AUTO: 203 10E9/L (ref 150–450)
PLATELET # BLD AUTO: 245 10E9/L (ref 150–450)
PLATELET # BLD AUTO: 92 10E9/L (ref 150–450)
POIKILOCYTOSIS BLD QL SMEAR: SLIGHT
POTASSIUM SERPL-SCNC: 3.6 MMOL/L (ref 3.4–5.3)
POTASSIUM SERPL-SCNC: 3.8 MMOL/L (ref 3.4–5.3)
POTASSIUM SERPL-SCNC: 3.9 MMOL/L (ref 3.4–5.3)
POTASSIUM SERPL-SCNC: 3.9 MMOL/L (ref 3.4–5.3)
POTASSIUM SERPL-SCNC: 4 MMOL/L (ref 3.4–5.3)
POTASSIUM SERPL-SCNC: 4.1 MMOL/L (ref 3.4–5.3)
POTASSIUM SERPL-SCNC: 4.2 MMOL/L (ref 3.4–5.3)
POTASSIUM SERPL-SCNC: 4.3 MMOL/L (ref 3.4–5.3)
POTASSIUM SERPL-SCNC: 4.5 MMOL/L (ref 3.4–5.3)
PROT SERPL-MCNC: 6.5 G/DL (ref 6.8–8.8)
PROT SERPL-MCNC: 6.8 G/DL (ref 6.8–8.8)
PROT SERPL-MCNC: 6.9 G/DL (ref 6.8–8.8)
PROT SERPL-MCNC: 7.1 G/DL (ref 6.8–8.8)
PROT SERPL-MCNC: 7.2 G/DL (ref 6.8–8.8)
PROT SERPL-MCNC: 7.2 G/DL (ref 6.8–8.8)
PROT SERPL-MCNC: 7.3 G/DL (ref 6.8–8.8)
PROT SERPL-MCNC: 7.4 G/DL (ref 6.8–8.8)
PROT SERPL-MCNC: 7.6 G/DL (ref 6.8–8.8)
PROT SERPL-MCNC: 7.6 G/DL (ref 6.8–8.8)
RBC # BLD AUTO: 3.15 10E12/L (ref 4.4–5.9)
RBC # BLD AUTO: 3.26 10E12/L (ref 4.4–5.9)
RBC # BLD AUTO: 3.36 10E12/L (ref 4.4–5.9)
RBC # BLD AUTO: 3.45 10E12/L (ref 4.4–5.9)
RBC # BLD AUTO: 3.58 10E12/L (ref 4.4–5.9)
RBC # BLD AUTO: 3.7 10E12/L (ref 4.4–5.9)
RBC # BLD AUTO: 3.73 10E12/L (ref 4.4–5.9)
RBC # BLD AUTO: 3.79 10E12/L (ref 4.4–5.9)
RBC # BLD AUTO: 3.94 10E12/L (ref 4.4–5.9)
RBC # BLD AUTO: 4.03 10E12/L (ref 4.4–5.9)
RBC # BLD AUTO: 4.06 10E12/L (ref 4.4–5.9)
RBC # BLD AUTO: 4.2 10E12/L (ref 4.4–5.9)
RBC # BLD AUTO: 4.2 10E12/L (ref 4.4–5.9)
RBC # BLD AUTO: 4.23 10E12/L (ref 4.4–5.9)
RBC # BLD AUTO: 4.26 10E12/L (ref 4.4–5.9)
RBC # BLD AUTO: 4.41 10E12/L (ref 4.4–5.9)
RBC # BLD AUTO: 4.57 10E12/L (ref 4.4–5.9)
RBC # BLD AUTO: 5.24 10E12/L (ref 4.4–5.9)
SODIUM SERPL-SCNC: 132 MMOL/L (ref 133–144)
SODIUM SERPL-SCNC: 134 MMOL/L (ref 133–144)
SODIUM SERPL-SCNC: 135 MMOL/L (ref 133–144)
SODIUM SERPL-SCNC: 136 MMOL/L (ref 133–144)
SODIUM SERPL-SCNC: 137 MMOL/L (ref 133–144)
SODIUM SERPL-SCNC: 137 MMOL/L (ref 133–144)
SODIUM SERPL-SCNC: 138 MMOL/L (ref 133–144)
SODIUM SERPL-SCNC: 138 MMOL/L (ref 133–144)
SODIUM SERPL-SCNC: 139 MMOL/L (ref 133–144)
SODIUM SERPL-SCNC: 140 MMOL/L (ref 133–144)
SODIUM SERPL-SCNC: 141 MMOL/L (ref 133–144)
SODIUM SERPL-SCNC: 142 MMOL/L (ref 133–144)
SODIUM SERPL-SCNC: 142 MMOL/L (ref 133–144)
WBC # BLD AUTO: 1.4 10E9/L (ref 4–11)
WBC # BLD AUTO: 1.6 10E9/L (ref 4–11)
WBC # BLD AUTO: 2 10E9/L (ref 4–11)
WBC # BLD AUTO: 2.3 10E9/L (ref 4–11)
WBC # BLD AUTO: 2.4 10E9/L (ref 4–11)
WBC # BLD AUTO: 2.5 10E9/L (ref 4–11)
WBC # BLD AUTO: 2.8 10E9/L (ref 4–11)
WBC # BLD AUTO: 2.9 10E9/L (ref 4–11)
WBC # BLD AUTO: 3.1 10E9/L (ref 4–11)
WBC # BLD AUTO: 3.3 10E9/L (ref 4–11)
WBC # BLD AUTO: 3.7 10E9/L (ref 4–11)
WBC # BLD AUTO: 4.3 10E9/L (ref 4–11)
WBC # BLD AUTO: 5 10E9/L (ref 4–11)
WBC # BLD AUTO: 6.5 10E9/L (ref 4–11)
WBC # BLD AUTO: 6.8 10E9/L (ref 4–11)
WBC # BLD AUTO: 8.2 10E9/L (ref 4–11)
WBC # BLD AUTO: 8.2 10E9/L (ref 4–11)
WBC # BLD AUTO: 8.4 10E9/L (ref 4–11)

## 2017-01-01 PROCEDURE — 82378 CARCINOEMBRYONIC ANTIGEN: CPT | Performed by: INTERNAL MEDICINE

## 2017-01-01 PROCEDURE — 99215 OFFICE O/P EST HI 40 MIN: CPT | Mod: GC | Performed by: INTERNAL MEDICINE

## 2017-01-01 PROCEDURE — 80053 COMPREHEN METABOLIC PANEL: CPT | Performed by: INTERNAL MEDICINE

## 2017-01-01 PROCEDURE — 85025 COMPLETE CBC W/AUTO DIFF WBC: CPT | Performed by: INTERNAL MEDICINE

## 2017-01-01 PROCEDURE — 25000128 H RX IP 250 OP 636: Mod: ZF | Performed by: INTERNAL MEDICINE

## 2017-01-01 PROCEDURE — 96413 CHEMO IV INFUSION 1 HR: CPT

## 2017-01-01 PROCEDURE — 81003 URINALYSIS AUTO W/O SCOPE: CPT | Performed by: INTERNAL MEDICINE

## 2017-01-01 PROCEDURE — 36415 COLL VENOUS BLD VENIPUNCTURE: CPT | Performed by: INTERNAL MEDICINE

## 2017-01-01 PROCEDURE — 93010 ELECTROCARDIOGRAM REPORT: CPT | Mod: Z6 | Performed by: EMERGENCY MEDICINE

## 2017-01-01 PROCEDURE — 99215 OFFICE O/P EST HI 40 MIN: CPT | Mod: ZP | Performed by: INTERNAL MEDICINE

## 2017-01-01 PROCEDURE — 80299 QUANTITATIVE ASSAY DRUG: CPT | Performed by: PSYCHIATRY & NEUROLOGY

## 2017-01-01 PROCEDURE — 80299 QUANTITATIVE ASSAY DRUG: CPT | Performed by: EMERGENCY MEDICINE

## 2017-01-01 PROCEDURE — 99213 OFFICE O/P EST LOW 20 MIN: CPT

## 2017-01-01 PROCEDURE — 80177 DRUG SCRN QUAN LEVETIRACETAM: CPT | Performed by: PSYCHIATRY & NEUROLOGY

## 2017-01-01 PROCEDURE — 99212 OFFICE O/P EST SF 10 MIN: CPT | Mod: ZF

## 2017-01-01 PROCEDURE — 99284 EMERGENCY DEPT VISIT MOD MDM: CPT | Performed by: EMERGENCY MEDICINE

## 2017-01-01 PROCEDURE — 36591 DRAW BLOOD OFF VENOUS DEVICE: CPT

## 2017-01-01 PROCEDURE — 99212 OFFICE O/P EST SF 10 MIN: CPT

## 2017-01-01 PROCEDURE — 99213 OFFICE O/P EST LOW 20 MIN: CPT | Mod: ZF

## 2017-01-01 PROCEDURE — 40000268 ZZH STATISTIC NO CHARGES: Mod: ZF

## 2017-01-01 PROCEDURE — 80053 COMPREHEN METABOLIC PANEL: CPT | Performed by: PSYCHIATRY & NEUROLOGY

## 2017-01-01 PROCEDURE — 80177 DRUG SCRN QUAN LEVETIRACETAM: CPT | Performed by: EMERGENCY MEDICINE

## 2017-01-01 PROCEDURE — 25000125 ZZHC RX 250: Mod: ZF | Performed by: INTERNAL MEDICINE

## 2017-01-01 PROCEDURE — 25000128 H RX IP 250 OP 636: Mod: JW,ZF | Performed by: INTERNAL MEDICINE

## 2017-01-01 PROCEDURE — 99284 EMERGENCY DEPT VISIT MOD MDM: CPT | Mod: 25 | Performed by: EMERGENCY MEDICINE

## 2017-01-01 PROCEDURE — 85025 COMPLETE CBC W/AUTO DIFF WBC: CPT | Performed by: EMERGENCY MEDICINE

## 2017-01-01 PROCEDURE — 25000128 H RX IP 250 OP 636: Performed by: INTERNAL MEDICINE

## 2017-01-01 PROCEDURE — 99212 OFFICE O/P EST SF 10 MIN: CPT | Mod: 25

## 2017-01-01 PROCEDURE — 25000128 H RX IP 250 OP 636: Mod: ZF | Performed by: NURSE PRACTITIONER

## 2017-01-01 PROCEDURE — 99214 OFFICE O/P EST MOD 30 MIN: CPT | Mod: 25 | Performed by: INTERNAL MEDICINE

## 2017-01-01 PROCEDURE — 25000128 H RX IP 250 OP 636: Performed by: EMERGENCY MEDICINE

## 2017-01-01 PROCEDURE — 25000125 ZZHC RX 250: Performed by: INTERNAL MEDICINE

## 2017-01-01 PROCEDURE — 99214 OFFICE O/P EST MOD 30 MIN: CPT | Mod: ZP | Performed by: NURSE PRACTITIONER

## 2017-01-01 PROCEDURE — 93005 ELECTROCARDIOGRAM TRACING: CPT | Performed by: EMERGENCY MEDICINE

## 2017-01-01 PROCEDURE — 85025 COMPLETE CBC W/AUTO DIFF WBC: CPT | Performed by: PSYCHIATRY & NEUROLOGY

## 2017-01-01 PROCEDURE — 80053 COMPREHEN METABOLIC PANEL: CPT | Performed by: EMERGENCY MEDICINE

## 2017-01-01 RX ORDER — METHYLPREDNISOLONE SODIUM SUCCINATE 125 MG/2ML
125 INJECTION, POWDER, LYOPHILIZED, FOR SOLUTION INTRAMUSCULAR; INTRAVENOUS
Status: CANCELLED
Start: 2017-01-01

## 2017-01-01 RX ORDER — ALBUTEROL SULFATE 0.83 MG/ML
2.5 SOLUTION RESPIRATORY (INHALATION)
Status: CANCELLED | OUTPATIENT
Start: 2017-01-01

## 2017-01-01 RX ORDER — HEPARIN SODIUM (PORCINE) LOCK FLUSH IV SOLN 100 UNIT/ML 100 UNIT/ML
5 SOLUTION INTRAVENOUS ONCE
Status: COMPLETED | OUTPATIENT
Start: 2017-01-01 | End: 2017-01-01

## 2017-01-01 RX ORDER — OXYCODONE HYDROCHLORIDE 5 MG/1
5-10 TABLET ORAL EVERY 4 HOURS PRN
Qty: 180 TABLET | Refills: 0 | Status: SHIPPED | OUTPATIENT
Start: 2017-01-01 | End: 2017-01-01

## 2017-01-01 RX ORDER — LACOSAMIDE 200 MG/1
TABLET ORAL
Qty: 90 TABLET | Refills: 0 | Status: SHIPPED | OUTPATIENT
Start: 2017-01-01 | End: 2017-01-01

## 2017-01-01 RX ORDER — ONDANSETRON 8 MG/1
8 TABLET, ORALLY DISINTEGRATING ORAL EVERY 8 HOURS PRN
Qty: 30 TABLET | Refills: 3 | COMMUNITY
Start: 2017-01-01

## 2017-01-01 RX ORDER — LORAZEPAM 0.5 MG/1
TABLET ORAL
Qty: 60 TABLET | Refills: 3 | Status: CANCELLED | OUTPATIENT
Start: 2017-01-01

## 2017-01-01 RX ORDER — MEPERIDINE HYDROCHLORIDE 25 MG/ML
25 INJECTION INTRAMUSCULAR; INTRAVENOUS; SUBCUTANEOUS EVERY 30 MIN PRN
Status: CANCELLED | OUTPATIENT
Start: 2017-01-01

## 2017-01-01 RX ORDER — EPINEPHRINE 1 MG/ML
0.3 INJECTION INTRAMUSCULAR; INTRAVENOUS; SUBCUTANEOUS EVERY 5 MIN PRN
Status: CANCELLED | OUTPATIENT
Start: 2017-01-01

## 2017-01-01 RX ORDER — LACOSAMIDE 200 MG/1
TABLET ORAL
Qty: 45 TABLET | Refills: 5 | Status: CANCELLED | OUTPATIENT
Start: 2017-01-01

## 2017-01-01 RX ORDER — HEPARIN SODIUM (PORCINE) LOCK FLUSH IV SOLN 100 UNIT/ML 100 UNIT/ML
500 SOLUTION INTRAVENOUS
Status: COMPLETED | OUTPATIENT
Start: 2017-01-01 | End: 2017-01-01

## 2017-01-01 RX ORDER — HEPARIN SODIUM (PORCINE) LOCK FLUSH IV SOLN 100 UNIT/ML 100 UNIT/ML
500 SOLUTION INTRAVENOUS ONCE
Status: COMPLETED | OUTPATIENT
Start: 2017-01-01 | End: 2017-01-01

## 2017-01-01 RX ORDER — DIPHENHYDRAMINE HYDROCHLORIDE 50 MG/ML
50 INJECTION INTRAMUSCULAR; INTRAVENOUS
Status: CANCELLED
Start: 2017-01-01

## 2017-01-01 RX ORDER — SCOLOPAMINE TRANSDERMAL SYSTEM 1 MG/1
PATCH, EXTENDED RELEASE TRANSDERMAL
Qty: 4 PATCH | Refills: 3 | Status: SHIPPED | OUTPATIENT
Start: 2017-01-01 | End: 2017-01-01

## 2017-01-01 RX ORDER — HEPARIN SODIUM (PORCINE) LOCK FLUSH IV SOLN 100 UNIT/ML 100 UNIT/ML
5 SOLUTION INTRAVENOUS
Status: COMPLETED | OUTPATIENT
Start: 2017-01-01 | End: 2017-01-01

## 2017-01-01 RX ORDER — LACOSAMIDE 200 MG/1
TABLET ORAL
Qty: 90 TABLET | Refills: 5 | Status: SHIPPED | OUTPATIENT
Start: 2017-01-01

## 2017-01-01 RX ORDER — ONDANSETRON 8 MG/1
8 TABLET, ORALLY DISINTEGRATING ORAL EVERY 8 HOURS PRN
Qty: 30 TABLET | Refills: 0 | Status: SHIPPED | OUTPATIENT
Start: 2017-01-01 | End: 2017-01-01

## 2017-01-01 RX ORDER — HEPARIN SODIUM (PORCINE) LOCK FLUSH IV SOLN 100 UNIT/ML 100 UNIT/ML
5 SOLUTION INTRAVENOUS EVERY 8 HOURS
Status: DISCONTINUED | OUTPATIENT
Start: 2017-01-01 | End: 2017-01-01 | Stop reason: HOSPADM

## 2017-01-01 RX ORDER — HEPARIN SODIUM (PORCINE) LOCK FLUSH IV SOLN 100 UNIT/ML 100 UNIT/ML
500 SOLUTION INTRAVENOUS EVERY 8 HOURS
Status: DISCONTINUED | OUTPATIENT
Start: 2017-01-01 | End: 2017-01-01 | Stop reason: HOSPADM

## 2017-01-01 RX ORDER — LORAZEPAM 0.5 MG/1
TABLET ORAL
Qty: 60 TABLET | Refills: 5 | Status: SHIPPED | OUTPATIENT
Start: 2017-01-01 | End: 2017-01-01

## 2017-01-01 RX ORDER — SODIUM CHLORIDE 9 MG/ML
1000 INJECTION, SOLUTION INTRAVENOUS CONTINUOUS PRN
Status: CANCELLED
Start: 2017-01-01

## 2017-01-01 RX ORDER — EPINEPHRINE 1 MG/ML
0.3 INJECTION, SOLUTION, CONCENTRATE INTRAVENOUS EVERY 5 MIN PRN
Status: CANCELLED | OUTPATIENT
Start: 2017-01-01

## 2017-01-01 RX ORDER — LACOSAMIDE 200 MG/1
TABLET ORAL
Qty: 30 TABLET | Refills: 5 | Status: SHIPPED | OUTPATIENT
Start: 2017-01-01 | End: 2017-01-01

## 2017-01-01 RX ORDER — EPINEPHRINE 0.3 MG/.3ML
0.3 INJECTION SUBCUTANEOUS EVERY 5 MIN PRN
Status: CANCELLED | OUTPATIENT
Start: 2017-01-01

## 2017-01-01 RX ORDER — HEPARIN SODIUM (PORCINE) LOCK FLUSH IV SOLN 100 UNIT/ML 100 UNIT/ML
5 SOLUTION INTRAVENOUS DAILY PRN
Status: DISCONTINUED | OUTPATIENT
Start: 2017-01-01 | End: 2017-01-01 | Stop reason: HOSPADM

## 2017-01-01 RX ORDER — LEVETIRACETAM 1000 MG/1
TABLET ORAL
Qty: 120 TABLET | Refills: 6 | Status: SHIPPED | OUTPATIENT
Start: 2017-01-01 | End: 2017-01-01

## 2017-01-01 RX ORDER — LORAZEPAM 0.5 MG/1
TABLET ORAL
Qty: 60 TABLET | Refills: 3 | Status: SHIPPED | OUTPATIENT
Start: 2017-01-01 | End: 2017-01-01

## 2017-01-01 RX ORDER — ALBUTEROL SULFATE 90 UG/1
1-2 AEROSOL, METERED RESPIRATORY (INHALATION)
Status: CANCELLED
Start: 2017-01-01

## 2017-01-01 RX ORDER — LACOSAMIDE 200 MG/1
TABLET ORAL
Qty: 30 TABLET | Refills: 3 | Status: SHIPPED | OUTPATIENT
Start: 2017-01-01 | End: 2017-01-01

## 2017-01-01 RX ORDER — OXYCODONE HYDROCHLORIDE 5 MG/1
5-10 TABLET ORAL EVERY 4 HOURS PRN
Qty: 180 TABLET | Refills: 0 | Status: SHIPPED | OUTPATIENT
Start: 2017-01-01

## 2017-01-01 RX ORDER — PHENOBARBITAL 60 MG/1
TABLET ORAL
Qty: 60 TABLET | Refills: 5 | Status: SHIPPED | OUTPATIENT
Start: 2017-01-01

## 2017-01-01 RX ORDER — LORAZEPAM 2 MG/ML
0.5 INJECTION INTRAMUSCULAR EVERY 4 HOURS PRN
Status: CANCELLED
Start: 2017-01-01

## 2017-01-01 RX ORDER — OXYCODONE HYDROCHLORIDE 5 MG/1
5 TABLET ORAL EVERY 4 HOURS PRN
Qty: 30 TABLET | Refills: 0 | Status: SHIPPED | OUTPATIENT
Start: 2017-01-01 | End: 2017-01-01

## 2017-01-01 RX ORDER — MECLIZINE HYDROCHLORIDE 25 MG/1
25 TABLET ORAL PRN
Refills: 0 | COMMUNITY
Start: 2017-01-01

## 2017-01-01 RX ORDER — LORAZEPAM 0.5 MG/1
TABLET ORAL
Qty: 60 TABLET | Refills: 5 | Status: SHIPPED | OUTPATIENT
Start: 2017-01-01

## 2017-01-01 RX ORDER — LACOSAMIDE 200 MG/1
TABLET ORAL
Qty: 270 TABLET | Refills: 3 | Status: CANCELLED | OUTPATIENT
Start: 2017-01-01

## 2017-01-01 RX ORDER — HEPARIN SODIUM (PORCINE) LOCK FLUSH IV SOLN 100 UNIT/ML 100 UNIT/ML
500 SOLUTION INTRAVENOUS EVERY 8 HOURS
Status: CANCELLED
Start: 2017-01-01

## 2017-01-01 RX ORDER — PHENOBARBITAL 15 MG/1
TABLET ORAL
COMMUNITY
Start: 2017-01-01

## 2017-01-01 RX ORDER — ONDANSETRON 8 MG/1
8 TABLET, ORALLY DISINTEGRATING ORAL EVERY 8 HOURS PRN
Qty: 30 TABLET | Refills: 3 | Status: SHIPPED | OUTPATIENT
Start: 2017-01-01 | End: 2017-01-01

## 2017-01-01 RX ORDER — LEVETIRACETAM 1000 MG/1
TABLET ORAL
Qty: 120 TABLET | Refills: 11 | Status: SHIPPED | OUTPATIENT
Start: 2017-01-01

## 2017-01-01 RX ORDER — MECLIZINE HYDROCHLORIDE 25 MG/1
25-50 TABLET ORAL 3 TIMES DAILY PRN
Qty: 30 TABLET | Refills: 0 | Status: SHIPPED | OUTPATIENT
Start: 2017-01-01 | End: 2017-01-01

## 2017-01-01 RX ORDER — LACOSAMIDE 200 MG/1
TABLET ORAL
Qty: 30 TABLET | Refills: 0 | COMMUNITY
Start: 2017-01-01 | End: 2017-01-01

## 2017-01-01 RX ADMIN — BEVACIZUMAB 850 MG: 400 INJECTION, SOLUTION INTRAVENOUS at 14:59

## 2017-01-01 RX ADMIN — SODIUM CHLORIDE, PRESERVATIVE FREE 5 ML: 5 INJECTION INTRAVENOUS at 14:51

## 2017-01-01 RX ADMIN — SODIUM CHLORIDE 250 ML: 9 INJECTION, SOLUTION INTRAVENOUS at 15:01

## 2017-01-01 RX ADMIN — SODIUM CHLORIDE, PRESERVATIVE FREE 5 ML: 5 INJECTION INTRAVENOUS at 15:33

## 2017-01-01 RX ADMIN — SODIUM CHLORIDE 250 ML: 9 INJECTION, SOLUTION INTRAVENOUS at 12:51

## 2017-01-01 RX ADMIN — BEVACIZUMAB 800 MG: 400 INJECTION, SOLUTION INTRAVENOUS at 16:31

## 2017-01-01 RX ADMIN — SODIUM CHLORIDE, PRESERVATIVE FREE 5 ML: 5 INJECTION INTRAVENOUS at 13:29

## 2017-01-01 RX ADMIN — SODIUM CHLORIDE, PRESERVATIVE FREE 5 ML: 5 INJECTION INTRAVENOUS at 14:24

## 2017-01-01 RX ADMIN — SODIUM CHLORIDE 250 ML: 9 INJECTION, SOLUTION INTRAVENOUS at 14:42

## 2017-01-01 RX ADMIN — SODIUM CHLORIDE, PRESERVATIVE FREE 5 ML: 5 INJECTION INTRAVENOUS at 08:05

## 2017-01-01 RX ADMIN — SODIUM CHLORIDE, PRESERVATIVE FREE 5 ML: 5 INJECTION INTRAVENOUS at 16:37

## 2017-01-01 RX ADMIN — SODIUM CHLORIDE, PRESERVATIVE FREE 500 UNITS: 5 INJECTION INTRAVENOUS at 18:25

## 2017-01-01 RX ADMIN — SODIUM CHLORIDE 250 ML: 9 INJECTION, SOLUTION INTRAVENOUS at 16:30

## 2017-01-01 RX ADMIN — SODIUM CHLORIDE, PRESERVATIVE FREE 500 UNITS: 5 INJECTION INTRAVENOUS at 18:20

## 2017-01-01 RX ADMIN — BEVACIZUMAB 800 MG: 400 INJECTION, SOLUTION INTRAVENOUS at 17:49

## 2017-01-01 RX ADMIN — SODIUM CHLORIDE, PRESERVATIVE FREE 5 ML: 5 INJECTION INTRAVENOUS at 16:15

## 2017-01-01 RX ADMIN — SODIUM CHLORIDE 250 ML: 9 INJECTION, SOLUTION INTRAVENOUS at 11:30

## 2017-01-01 RX ADMIN — SODIUM CHLORIDE, PRESERVATIVE FREE 5 ML: 5 INJECTION INTRAVENOUS at 12:48

## 2017-01-01 RX ADMIN — SODIUM CHLORIDE 250 ML: 9 INJECTION, SOLUTION INTRAVENOUS at 17:48

## 2017-01-01 RX ADMIN — BEVACIZUMAB 800 MG: 400 INJECTION, SOLUTION INTRAVENOUS at 17:54

## 2017-01-01 RX ADMIN — BEVACIZUMAB 800 MG: 400 INJECTION, SOLUTION INTRAVENOUS at 15:02

## 2017-01-01 RX ADMIN — BEVACIZUMAB 800 MG: 400 INJECTION, SOLUTION INTRAVENOUS at 18:33

## 2017-01-01 RX ADMIN — SODIUM CHLORIDE, PRESERVATIVE FREE 5 ML: 5 INJECTION INTRAVENOUS at 17:17

## 2017-01-01 RX ADMIN — SODIUM CHLORIDE 500 ML: 9 INJECTION, SOLUTION INTRAVENOUS at 14:08

## 2017-01-01 RX ADMIN — SODIUM CHLORIDE, PRESERVATIVE FREE 500 UNITS: 5 INJECTION INTRAVENOUS at 15:34

## 2017-01-01 RX ADMIN — SODIUM CHLORIDE, PRESERVATIVE FREE 500 UNITS: 5 INJECTION INTRAVENOUS at 12:01

## 2017-01-01 RX ADMIN — SODIUM CHLORIDE, PRESERVATIVE FREE 5 ML: 5 INJECTION INTRAVENOUS at 12:06

## 2017-01-01 RX ADMIN — BEVACIZUMAB 800 MG: 400 INJECTION, SOLUTION INTRAVENOUS at 11:30

## 2017-01-01 RX ADMIN — SODIUM CHLORIDE, PRESERVATIVE FREE 5 ML: 5 INJECTION INTRAVENOUS at 17:02

## 2017-01-01 RX ADMIN — SODIUM CHLORIDE, PRESERVATIVE FREE 5 ML: 5 INJECTION INTRAVENOUS at 12:38

## 2017-01-01 RX ADMIN — SODIUM CHLORIDE, PRESERVATIVE FREE 5 ML: 5 INJECTION INTRAVENOUS at 19:05

## 2017-01-01 RX ADMIN — SODIUM CHLORIDE, PRESERVATIVE FREE 5 ML: 5 INJECTION INTRAVENOUS at 12:01

## 2017-01-01 RX ADMIN — SODIUM CHLORIDE, PRESERVATIVE FREE 500 UNITS: 5 INJECTION INTRAVENOUS at 08:54

## 2017-01-01 RX ADMIN — BEVACIZUMAB 800 MG: 400 INJECTION, SOLUTION INTRAVENOUS at 14:08

## 2017-01-01 RX ADMIN — SODIUM CHLORIDE, PRESERVATIVE FREE 5 ML: 5 INJECTION INTRAVENOUS at 21:07

## 2017-01-01 RX ADMIN — SODIUM CHLORIDE 250 ML: 9 INJECTION, SOLUTION INTRAVENOUS at 17:52

## 2017-01-01 RX ADMIN — SODIUM CHLORIDE, PRESERVATIVE FREE 500 UNITS: 5 INJECTION INTRAVENOUS at 18:21

## 2017-01-01 RX ADMIN — SODIUM CHLORIDE, PRESERVATIVE FREE 5 ML: 5 INJECTION INTRAVENOUS at 10:25

## 2017-01-01 RX ADMIN — BEVACIZUMAB 800 MG: 400 INJECTION, SOLUTION INTRAVENOUS at 12:51

## 2017-01-01 RX ADMIN — SODIUM CHLORIDE, PRESERVATIVE FREE 5 ML: 5 INJECTION INTRAVENOUS at 09:11

## 2017-01-01 ASSESSMENT — ENCOUNTER SYMPTOMS
DIZZINESS: 1
SHORTNESS OF BREATH: 0
NECK STIFFNESS: 0
ARTHRALGIAS: 0
COLOR CHANGE: 0
FEVER: 0
EYE REDNESS: 0
CONFUSION: 0
DIFFICULTY URINATING: 0
HEADACHES: 0
ABDOMINAL PAIN: 0

## 2017-01-01 ASSESSMENT — PAIN SCALES - GENERAL
PAINLEVEL: NO PAIN (0)

## 2017-01-02 NOTE — NURSING NOTE
Patient was already roomed because patient was seeing another provider. Face to face time 0 minutes.

## 2017-01-02 NOTE — Clinical Note
1/2/2017      RE: Deandre Alex  [address]  [place] [address]       HISTORY OF PRESENT ILLNESS:  Deandre Alex is here today in followup of metastatic rectal cancer.  He has disease metastatic to the brain and lungs.  He is here today for ongoing evaluation of his tumor status.  He currently is on active treatment only with Avastin.  At our last evaluation, there were just a couple of tiny lung nodules and minimal evidence of progression in his brain.  He notes since we saw him last that maybe he has had a little bit of progression of the dysfunction in one arm, but that is not a major problem for him.  He continues to have intermittent seizures. They seem to occur more often at times of stress.  This past month actually he has only had a couple.  His appetite remains good.  His energy is good.  He is still working.  He does not have any respiratory symptoms.  He does not have any significant pain.       PHYSICAL EXAMINATION:  On exam today, he is alert and robustly well-appearing.  I did not otherwise examine him.       RESULTS:  I reviewed with the patient and his wife his lab work and imaging studies.  His brain MR shows some minor progression in his resection bed.  In his lungs, there are a couple of new tiny nodules on the right and some scattered old nodules that are unchanged and then on the left is one nodule that has clearly grown from about 6 mm to 13 mm over the last several months.  His electrolytes, renal function, liver enzymes and blood counts are all normal.  A CEA level is 1.5.       ASSESSMENT:  Progressive metastatic rectal cancer.  The patient has very small-volume disease and is essentially asymptomatic except for the neurologic issues.  He has not had Lonsurf or regorafenib as his two remaining standard treatment options.  My recommendation was that we add Lonsurf on to his Avastin.  I talked to Mr. Alex as an alternative, the use of regorafenib. Because that has more toxicity, and he  is doing so well right now that we decided we would save that for later.  His active brain metastases will preclude enrollment in clinical trials for the most part.   I talked with Dr. Blanchard, who felt if need be he might be able to do a little more resection on his brain met, but after some discussion we decided to defer that for now. We could choose not to treat this, but he and his wife think they probably want to go ahead with active treatment.      He might have a business trip which he views as an important opportunity coming up in about 3 weeks and if he is going to be doing that, then he would like to defer starting until then.  Given the relatively indolent nature of his tumor, I do not see much downside to waiting those few weeks.  I went over the details of the Lonsurf with the main risks being cytopenias and associated infection risk.  He and his wife expressed a good understanding.  We will get that set up to start later this week unless he wants to defer it until after his trip.        Total visit time today was greater than 45 minutes, all spent on the above discussion.       Willis Ramon MD

## 2017-01-02 NOTE — NURSING NOTE
Chief Complaint   Patient presents with     Port Draw     Port accessed by RN, labs collected and sent, line flushed with NS and heparin. Vitals taken and pt going to CT now.     Yeny Simmons

## 2017-01-02 NOTE — Clinical Note
1/2/2017       RE: Deandre Alex  [address]  [place] [address]     Dear Colleague,    Thank you for referring your patient, Deandre Alex, to the [facility]. Please see a copy of my visit note below.    CLINIC VISIT       HISTORY OF PRESENT ILLNESS:  Mr. Alex is a 46-year-old male returning to the Neurosurgery Brain Tumor Clinic today for known primary colorectal cancer with brain metastasis.  Mr. Alex has undergone 2 prior resections of his brain tumor, with the last in 2015, followed by 2 Gamma Knife treatments, which were completed also in 2015.  The patient states that when he was last seen in October, he was experiencing what he thought at the time was increased frequency of seizures, as well as decreasing right arm functionality.  Since that time, he has been taking meticulous records of his seizures, and has not determined there to be pattern.  He has recorded frequency per month, which does not demonstrate any trends.  He does feel, however, that his seizures have been worsening in intensity and duration.  More significantly, he has noted continued worsening of his right arm.  He does complain of worsening dexterity, which includes worsening handwriting and ability to draw, which is important to him as an .  He also complains of decreased strength, primarily at the shoulder.  Mr. Alex has not noted any worsening of his right lower extremity symptoms.  He continues to use an ankle brace, which is adequate to help him ambulate.      There were no vitals filed for this visit.  There is no weight on file to calculate BMI.  Data Unavailable     PHYSICAL EXAMINATION:  The patient is alert and appropriately communicative.  The patient's speech is fluent without dysphagia.  He is able to name simple objects.  He can repeat simple sentences.  Pupils are equal, round and reactive to light.  Extraocular movements intact.  Eye fields intact.  Facial sensation intact in the V1-V3  distribution bilaterally.  Facial smile is symmetric.  Hearing is grossly intact.  Soft palate and uvula elevate symmetrically.  Tongue protrudes along the midline.  Shoulder shrug is 5/5 bilaterally.  Sensation to light touch in upper and lower extremities bilaterally.  Strength is 5/5 in bilateral upper extremities, with the exception of 4+/5 right shoulder abduction.  DTRs are normal and symmetric in bilateral upper extremities.  Decreased right hand rapid alternating movements.  Decreased right finger-to-nose testing.  Negative pronator drift.        IMAGING:  Brain MRI dated 01/02/2017 was available for review today.  This study demonstrates a small tumor expansion along the inferior portion of known metastasis.  There is contrast enhancement with surrounding edema.  The surrounding edema was noted on his last scan in October.  The mass does appear to be larger than that noted in October.  No substantial mass effect or other new lesions notable today.      ASSESSMENT:  Primary colorectal cancer with known brain metastasis.  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     PLAN:   1.  The patient is scheduled to follow up regarding known lung metastases with his oncologist, Dr. Ramon, following visiting me in clinic today.  If the patient and Dr. Ramon intend to continue managing his lung metastasis, repeat surgical debaulking would be a reasonable option for his brain metastasis.    I had a long conversation with the patient and his wife that further intervention is not without its risks.  I understand that the patient has several near-term goals to see through, as well as a concern that he may no longer be able to independently change his colostomy pouch.  Though I feel there is not high risk in impacting his goals or inhibiting his ability to maintain his own colostomy, he was informed that there is some degree of risk present.  The patient was understanding of this.      Following the patient's visit  with Dr. Ramon, Dr. Ramon and I will have a conversation regarding the patient's goals of care, and long-term treatment plans will be developed with his wishes in mind.     I saw the patient with the resident.  I have reviewed and edited the resident note and agree with the plan of care.          Dictated by Joel Pimentel MD, Resident         STEFFI LOMAS MD               D: 2017 17:25   T: 2017 07:44   MT: OLE      Name:     TREE HOLM   MRN:      [id number]        Account:      [id number]   :      1970           Service Date: 2017      Document: [id number]

## 2017-01-02 NOTE — NURSING NOTE
BMT Heme Malignancy Rooming Note    Deandre GERI Isai - 1/2/2017 4:59 PM     Chief Complaint   Patient presents with     RECHECK     Patient with Matastasis to the brain here for provider        There were no vitals taken for this visit.     Medications reviewed: Yes    Labs drawn: No    Dressing changed: No     Medications given: No    Staff time:8    Additional information if applicable: Patient offered no complaints    Elvie Ba RN

## 2017-01-02 NOTE — PATIENT INSTRUCTIONS
Contact Numbers    [facility] [place] [phone]  [facility] [place]:  [phone]    Call triage with chills and/or temperature greater than or equal to 100.5, uncontrolled nausea/vomiting, diarrhea, constipation, dizziness, shortness of breath, chest pain, bleeding, unexplained bruising, or any new/concerning symptoms, questions/concerns.     If you are having any concerning symptoms or wish to speak to a provider before your next infusion visit, please call your care coordinator or triage to notify them so we can adequately serve you.       After Hours: [phone]    If after hours, weekends, or holidays, call main hospital  and ask for Oncology doctor on call.           January 2017 Sunday Monday Tuesday Wednesday Thursday Friday Saturday   1     2     [facility] DRAW   11:45 AM   (15 min.)    [facility] DRAW   [facility] Lab Draw     CT CHEST ABDOMEN PELVIS WWO   12:20 PM   (20 min.)   [address] [place] [address] CT     MR BRAIN WWO    1:00 PM   (45 min.)   MR[address] [place] [address] MRI     [facility] ONC INFUSION 60    2:00 PM   (60 min.)   [facility]   [facility]     [facility] RETURN    3:30 PM   (15 min.)   Jewel Blanchard MD   [facility]     [facility] RETURN    4:00 PM   (30 min.)   Willis Ramon MD   [facility] 3     4     5     6     7       8     9     10     11     12     13     [facility] RETURN SEIZURE   10:30 AM   (30 min.)   Erasto Richardson MD   [facility] 14       15     16     17     18     19     20     21       22     23     24     25     26     27     28       29     30     31 February 2017 Sunday Monday Tuesday Wednesday Thursday Friday Saturday                  1     2     3     4       5     6     7     8     9     10     11       12     13     14     15     16     17     18       19     20     21     22     23     24     25       26     27     28                                       Lab Results:  Recent Results (from the past 12 hour(s))   Comprehensive metabolic panel    Collection Time: 01/02/17 12:18 PM   Result Value Ref Range    Sodium 137 133 - 144 mmol/L    Potassium 4.2 3.4 - 5.3 mmol/L    Chloride 105 94 - 109 mmol/L    Carbon Dioxide 27 20 - 32 mmol/L    Anion Gap 5 3 - 14 mmol/L    Glucose 88 70 - 99 mg/dL    Urea Nitrogen 13 7 - 30 mg/dL    Creatinine 0.93 0.66 - 1.25 mg/dL    GFR Estimate 88 >60 mL/min/1.7m2    GFR Estimate If Black >90   GFR Calc   >60 mL/min/1.7m2    Calcium 9.1 8.5 - 10.1 mg/dL    Bilirubin Total 0.8 0.2 - 1.3 mg/dL    Albumin 4.0 3.4 - 5.0 g/dL    Protein Total 7.6 6.8 - 8.8 g/dL    Alkaline Phosphatase 105 40 - 150 U/L    ALT 23 0 - 70 U/L    AST 28 0 - 45 U/L   CBC with platelets differential    Collection Time: 01/02/17 12:18 PM   Result Value Ref Range    WBC 8.4 4.0 - 11.0 10e9/L    RBC Count 5.24 4.4 - 5.9 10e12/L    Hemoglobin 17.3 13.3 - 17.7 g/dL    Hematocrit 48.5 40.0 - 53.0 %    MCV 93 78 - 100 fl    MCH 33.0 26.5 - 33.0 pg    MCHC 35.7 31.5 - 36.5 g/dL    RDW 12.7 10.0 - 15.0 %    Platelet Count 167 150 - 450 10e9/L    Diff Method Automated Method     % Neutrophils 80.7 %    % Lymphocytes 9.3 %    % Monocytes 5.1 %    % Eosinophils 3.9 %    % Basophils 0.6 %    % Immature Granulocytes 0.4 %    Nucleated RBCs 0 0 /100    Absolute Neutrophil 6.8 1.6 - 8.3 10e9/L    Absolute Lymphocytes 0.8 0.8 - 5.3 10e9/L    Absolute Monocytes 0.4 0.0 - 1.3 10e9/L    Absolute Eosinophils 0.3 0.0 - 0.7 10e9/L    Absolute Basophils 0.1 0.0 - 0.2 10e9/L    Abs Immature Granulocytes 0.0 0 - 0.4 10e9/L    Absolute Nucleated RBC 0.0    Creatinine POCT    Collection Time: 01/02/17 12:36 PM   Result Value Ref Range    Creatinine 0.8 0.66 - 1.25 mg/dL    GFR Estimate >90 >60 mL/min/1.7m2    GFR Estimate If Black >90 >60 mL/min/1.7m2   Protein qualitative urine    Collection Time: 01/02/17  2:00 PM   Result Value Ref Range    Protein  Albumin Urine Negative NEG mg/dL

## 2017-01-03 NOTE — PROGRESS NOTES
HISTORY OF PRESENT ILLNESS:  Deandre Alex is here today in followup of metastatic rectal cancer.  He has disease metastatic to the brain and lungs.  He is here today for ongoing evaluation of his tumor status.  He currently is on active treatment only with Avastin.  At our last evaluation, there were just a couple of tiny lung nodules and minimal evidence of progression in his brain.  He notes since we saw him last that maybe he has had a little bit of progression of the dysfunction in one arm, but that is not a major problem for him.  He continues to have intermittent seizures. They seem to occur more often at times of stress.  This past month actually he has only had a couple.  His appetite remains good.  His energy is good.  He is still working.  He does not have any respiratory symptoms.  He does not have any significant pain.       PHYSICAL EXAMINATION:  On exam today, he is alert and robustly well-appearing.  I did not otherwise examine him.       RESULTS:  I reviewed with the patient and his wife his lab work and imaging studies.  His brain MR shows some minor progression in his resection bed.  In his lungs, there are a couple of new tiny nodules on the right and some scattered old nodules that are unchanged and then on the left is one nodule that has clearly grown from about 6 mm to 13 mm over the last several months.  His electrolytes, renal function, liver enzymes and blood counts are all normal.  A CEA level is 1.5.       ASSESSMENT:  Progressive metastatic rectal cancer.  The patient has very small-volume disease and is essentially asymptomatic except for the neurologic issues.  He has not had Lonsurf or regorafenib as his two remaining standard treatment options.  My recommendation was that we add Lonsurf on to his Avastin.  I talked to Mr. Alex as an alternative, the use of regorafenib. Because that has more toxicity, and he is doing so well right now that we decided we would save that for later.  His  active brain metastases will preclude enrollment in clinical trials for the most part.   I talked with Dr. Blanchard, who felt if need be he might be able to do a little more resection on his brain met, but after some discussion we decided to defer that for now. We could choose not to treat this, but he and his wife think they probably want to go ahead with active treatment.      He might have a business trip which he views as an important opportunity coming up in about 3 weeks and if he is going to be doing that, then he would like to defer starting until then.  Given the relatively indolent nature of his tumor, I do not see much downside to waiting those few weeks.  I went over the details of the Lonsurf with the main risks being cytopenias and associated infection risk.  He and his wife expressed a good understanding.  We will get that set up to start later this week unless he wants to defer it until after his trip.        Total visit time today was greater than 45 minutes, all spent on the above discussion.

## 2017-01-03 NOTE — PROGRESS NOTES
CLINIC VISIT       HISTORY OF PRESENT ILLNESS:  Mr. Alex is a 46-year-old male returning to the Neurosurgery Brain Tumor Clinic today for known primary colorectal cancer with brain metastasis.  Mr. Alex has undergone 2 prior resections of his brain tumor, with the last in 2015, followed by 2 Gamma Knife treatments, which were completed also in 2015.  The patient states that when he was last seen in October, he was experiencing what he thought at the time was increased frequency of seizures, as well as decreasing right arm functionality.  Since that time, he has been taking meticulous records of his seizures, and has not determined there to be pattern.  He has recorded frequency per month, which does not demonstrate any trends.  He does feel, however, that his seizures have been worsening in intensity and duration.  More significantly, he has noted continued worsening of his right arm.  He does complain of worsening dexterity, which includes worsening handwriting and ability to draw, which is important to him as an .  He also complains of decreased strength, primarily at the shoulder.  Mr. Alex has not noted any worsening of his right lower extremity symptoms.  He continues to use an ankle brace, which is adequate to help him ambulate.      There were no vitals filed for this visit.  There is no weight on file to calculate BMI.  Data Unavailable     PHYSICAL EXAMINATION:  The patient is alert and appropriately communicative.  The patient's speech is fluent without dysphagia.  He is able to name simple objects.  He can repeat simple sentences.  Pupils are equal, round and reactive to light.  Extraocular movements intact.  Eye fields intact.  Facial sensation intact in the V1-V3 distribution bilaterally.  Facial smile is symmetric.  Hearing is grossly intact.  Soft palate and uvula elevate symmetrically.  Tongue protrudes along the midline.  Shoulder shrug is 5/5 bilaterally.  Sensation to light touch in  upper and lower extremities bilaterally.  Strength is 5/5 in bilateral upper extremities, with the exception of 4+/5 right shoulder abduction.  DTRs are normal and symmetric in bilateral upper extremities.  Decreased right hand rapid alternating movements.  Decreased right finger-to-nose testing.  Negative pronator drift.        IMAGING:  Brain MRI dated 01/02/2017 was available for review today.  This study demonstrates a small tumor expansion along the inferior portion of known metastasis.  There is contrast enhancement with surrounding edema.  The surrounding edema was noted on his last scan in October.  The mass does appear to be larger than that noted in October.  No substantial mass effect or other new lesions notable today.      ASSESSMENT:  Primary colorectal cancer with known brain metastasis.  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     PLAN:   1.  The patient is scheduled to follow up regarding known lung metastases with his oncologist, Dr. Ramon, following visiting me in clinic today.  If the patient and Dr. Ramon intend to continue managing his lung metastasis, repeat surgical debaulking would be a reasonable option for his brain metastasis.    I had a long conversation with the patient and his wife that further intervention is not without its risks.  I understand that the patient has several near-term goals to see through, as well as a concern that he may no longer be able to independently change his colostomy pouch.  Though I feel there is not high risk in impacting his goals or inhibiting his ability to maintain his own colostomy, he was informed that there is some degree of risk present.  The patient was understanding of this.      Following the patient's visit with Dr. Ramon, Dr. Ramon and I will have a conversation regarding the patient's goals of care, and long-term treatment plans will be developed with his wishes in mind.     I saw the patient with the resident.  I have reviewed  and edited the resident note and agree with the plan of care.      Steffi Lomas MD         Dictated by Lui Pimentel MD, Resident         STEFFI LOMAS MD       As dictated by LUI PIMENTEL MD            D: 2017 17:25   T: 2017 07:44   MT: OLE      Name:     TREE HOLM   MRN:      [id number]        Account:      [id number]   :      1970           Service Date: 2017      Document: [id number]

## 2017-01-04 NOTE — TELEPHONE ENCOUNTER
Prior Authorization Approval    Authorization Effective Date: 1/3/2017  Authorization Expiration Date: 7/3/2017  Medication: Lonsurf - Approved  Approved Dose/Quantity: all strengths  Reference #:     Insurance Company: [employer]  Expected CoPay: $0.00     CoPay Card Available:      Foundation Assistance Needed:    Which Pharmacy is filling the prescription (Not needed for infusion/clinic administered): [facility] [address]

## 2017-01-04 NOTE — TELEPHONE ENCOUNTER
"Oral Chemotherapy Monitoring Program    Primary Oncologist: Dr. Ramon  Primary Oncology Clinic: [facility]  Cancer Diagnosis: Rectal Cancer    Drug: Lonsurf 70mg(2x15mg + 2x20mg) PO BID, on D1-D5 + D8-D12 (28 days cycle)  Start Date: As soon as available  Dose is appropriate for patients: 1.98m2 BSA   Expected duration of therapy: Until disease progression or unacceptable toxicity    Drug Interaction Assessment: No new drug interactions.    Lab Monitoring Plan  Monitoring Plan:    C1D1+   CBC, CMP C2D1+ Call, CBC, CMP C3D1+ Call, CBC, CMP C4D1+ Call, CBC, CMP C5D1+ Call, CBC, CMP C6D1+ Call, CBC, CMP   C1D8+  C2D8+  C3D8+  C4D8+  C5D8+  C6D8+    C1D15+ CBC C2D15+ CBC C3D15+ CBC C4D15+ CBC C5D15+ CBC C6D15+ CBC   C1D22+  C2D22+  C3D22+  C4D22+  C5D22+  C6D22+      Labs drawn at [facility]. (Knows to schedule labs on Day 15)    Subjective/Objective:  Deandre Alex is a 46 year old male contacted by phone for an initial visit for oral chemotherapy education.      ORAL CHEMOTHERAPY 1/4/2017   Drug Name Lonsurf (Trifluridine/Tipiracil)   Current Dosage Other   Current Schedule Other   Cycle Details Other       Last PHQ-2 Score on record:   PHQ-2 ( [facility]) 11/21/2016 10/10/2016   Q1: Little interest or pleasure in doing things 0 0   Q2: Feeling down, depressed or hopeless 0 0   PHQ-2 Score 0 0       Patient does not report depression symptoms.      Vitals:  BP:   BP Readings from Last 1 Encounters:   01/02/17 132/88     Wt Readings from Last 1 Encounters:   01/02/17 77.928 kg (171 lb 12.8 oz)     Estimated body surface area is 1.98 meters squared as calculated from the following:    Height as of 10/10/16: 1.803 m (5' 11\").    Weight as of 1/2/17: 77.928 kg (171 lb 12.8 oz).      Labs:  NA      137   1/2/2017   POTASSIUM      4.2   1/2/2017  FAYE      9.1   1/2/2017  MAG      2.2   3/4/2015  PHOS      4.2   3/4/2015  ALBUMIN      4.0   1/2/2017  BUN       13   1/2/2017  CR     0.93   1/2/2017    AST       " 28   1/2/2017  ALT       23   1/2/2017  BILITOTAL      0.8   1/2/2017    WBC      8.4   1/2/2017  HGB     17.3   1/2/2017  PLT      167   1/2/2017  ANEU      6.8   1/2/2017      Assessment:  Patient is appropriate to start therapy.    Plan:  Basic chemotherapy teaching was reviewed with the patient including indication, start date of therapy, dose, administration, adverse effects, missed doses, food and drug interactions, monitoring, side effect management, office contact information, and safe handling. Written materials were provided and all questions answered.    Follow-Up:  Follow up by phone during first week of therapy.     Clinton Bryan  Pharmacy Intern  [facility]

## 2017-01-09 NOTE — PROGRESS NOTES
45 year old male with a brain metastasis from colorectal cancer. Has had surgery x2 and GKRS x2. Has been on avastin for last few months. Overall has been doing well. No new neurologic or other concerns.        3/26/15: Gamma Knife: Dr. Cristobal / Dr. Blanchard: GAMMA KNIFE PROCEDURE NOTE and TREATMENT SUMMARY    LEFT FRONTAL  8 MM COLLIMATOR  20 Gy To 50 % isodose  15 shots     11/23/15: Tumor Board: MRI stable (not worse) Appears to still have tumor burden but on Avastin now.    3/7/16:  Tumor Board:  MRI stable - on Avastin.  Keep appointment for MRI on 4/29/16 with r/c.     5/2/16 - MRI stable;  R/C with MRI in 2-3 months.  Continue Avastin.     7/11/16 - MRI stable; R/C with MRI 3 months;  Continues on Avastin.    10/10/16 -- MRI relatively stable since July with possibly slight progression.  PLAN --   R/C with MRI in 3 months.  May have other options- to discuss with medical oncology.     1/2/17: MRI shows slight serial progression.   Recommendation: repeat surgical debaulking would be a reasonable option for his brain metastasis if patient and Dr. Ramon wish to continue to manage metastasis

## 2017-01-09 NOTE — TELEPHONE ENCOUNTER
Patient called because he ran out of vimpat. He is scheduled to see Dr. Richardson in Neurology clinic 1/27/16. I prescribed 30-day supply of vimpat 300mg BID, which should last until he is seen in clinic. I could not e-prescribe b/c vimpat is controlled substance, so I called it into his pharmacy ([facility] in [place], [place]) & they will put it under Dr. Richardson's LUNA number, which they had on record.     Domenica Mcdonough  G2 Neurology

## 2017-01-09 NOTE — TELEPHONE ENCOUNTER
----- Message from Asif Carbajal CMA sent at 1/9/2017 10:04 AM CST -----  Regarding: refill, beverly pt  Drug Name: vimpat      Dose: 200mg     SIG: Taking 1.5 tabs BID                                 Days Supply Needed: 30 days      Pharmacy: [id number] [place] [address] [place] [address]    Date of Last Appointment: 5/19/15  Next RTC Date: 1/27/17  Has a script already been sent recently: No    Additonal Notes: patient is going to [facility] and will not be back until appt time. Please refill to last until next appt.

## 2017-01-09 NOTE — TELEPHONE ENCOUNTER
Oral Chemotherapy Monitoring Program    Patient called Oral Chemo line to discuss his Lonsurf therapy.    Started: 1/9/16    He wanted to confirm he was taking the right dose of the medication which was 2x15mg and 2x20mg tablets twice daily for a total of 70mg BID. Deandre also reported that he does not have any side effects thus far and will call us if he begins to experience any before he leaves for [facility] this Sunday (1/15). Also notified patient he will needs labs when he returns from his trip.     Clinton Bryan  Pharmacy Intern   [facility]

## 2017-01-25 NOTE — TELEPHONE ENCOUNTER
Oral Chemotherapy Monitoring Program    Placed call to patient in follow up of Lonsurf therapy.    Labs from 1/25/17 show no concerning abnormalities (messaged Nicky Dorantes NP who was not concerned about bili 1.4 or  at this time).    Nicky also requested to see him before cycle 2 with CMP and CBC with differential. I informed the patient and he will schedule these appointments tomorrow (I advised him to try to get appointments next week so he is seen prior to start of cycle 2 on 2/6/17).    Aixa Cesar, Pharmacy Intern  [facility]  [phone]

## 2017-01-25 NOTE — NURSING NOTE
Chief Complaint   Patient presents with     Port Draw     port accessed with power needle for labs, heparin locked and de accessed

## 2017-01-27 NOTE — Clinical Note
2017       RE: Deandre Alex  [address]  [facility] [address]     Dear Colleague,    Thank you for referring your patient, Deandre Alex, to the [facility] NEUROLOGY at [facility]. Please see a copy of my visit note below.    2017          Jewel Blanchard MD   [facility] Neurosurgery   [address] [place] [address]      RE: Deandre Alex   MRN: [id number]   : 1970      Dear Dr. Blanchard:      Mr. 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  He was recently in [place] on a trip in the [place] [facility] and enjoyed quite a bit, as he is an .  He is having about 1-3 seizures per month, usually localized to the right hand.  I have not seen him since .      He says that he tried Ativan 0.5 before he goes to meetings where he tends to get them, and this does not work.  He is on pretty high doses of lacosamide at 300 b.i.d., and he is on levetiracetam at 2000 twice a day.  He has had very good concentrations of the levetiracetam, where a level last obtained in 2015 was 47.2, while the lacosamide level at that time was 8.8.  Therapeutic is not known, but up to 15.        I okayed both medications at the same doses for now.        According to the last MRI, the lesions are growing and he has been started on a new antineoplastic known as Lonsurf.  I will see if there is any interaction with the anticonvulsants, especially with the Vimpat.  We will also see what his levels are today.        PHYSICAL EXAMINATION:  His blood pressure is 113/78.  Pulse is 77.      The patient does show significant weakness of his right leg, which is similar to previous.  His right hand is only mildly weak.      In summary, he continues to have 1-3 simple partial motor seizures per month with no secondary generalization.  He is in dual  therapy.  I have increased his dose of Ativan, which he may use as a rescue medication, to 1 mg every 8 hours, and we may increase his lacosamide dose to a higher level pending on his last level, which was drawn today.  He is on a drug Lonsurf, which is a new antineoplastic agent.  We will check to see if there is any interaction with his anticonvulsant.      Thank you for referring him to Neurology.      Sincerely,      Erasto Richardson MD     D: 2017 09:14   T: 2017 11:22   MT: lianne      Name:     TREE HOLM   MRN:      [id number]        Account:      [id number]   :      1970           Service Date: 2017      Document: [id number]

## 2017-01-27 NOTE — NURSING NOTE
Chief Complaint   Patient presents with     RECHECK     [facility] RETURN SEIZURE     Delia Hernandez MA

## 2017-01-31 NOTE — PROGRESS NOTES
2017          Jewel Blanchard MD   [facility] Neurosurgery   [address] [place] [address]      RE: Deandre Alex   MRN: [id number]   : 1970      Dear Dr. Blanchard:      Mr. 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  He was recently in [facility] [place] a trip [place] the [place] [facility] and enjoyed quite a bit, as he is an .  He is having about 1-3 seizures per month, usually localized to the right hand.  I have not seen him since .      He says that he tried Ativan 0.5 before he goes to meetings where he tends to get them, and this does not work.  He is on pretty high doses of lacosamide at 300 b.i.d., and he is on levetiracetam at 2000 twice a day.  He has had very good concentrations of the levetiracetam, where a level last obtained in 2015 was 47.2, while the lacosamide level at that time was 8.8.  Therapeutic is not known, but up to 15.        I okayed both medications at the same doses for now.        According to the last MRI, the lesions are growing and he has been started on a new antineoplastic known as Lonsurf.  I will see if there is any interaction with the anticonvulsants, especially with the Vimpat.  We will also see what his levels are today.        PHYSICAL EXAMINATION:  His blood pressure is 113/78.  Pulse is 77.      The patient does show significant weakness of his right leg, which is similar to previous.  His right hand is only mildly weak.      In summary, he continues to have 1-3 simple partial motor seizures per month with no secondary generalization.  He is in dual therapy.  I have increased his dose of Ativan, which he may use as a rescue medication, to 1 mg every 8 hours, and we may increase his lacosamide dose to a higher level pending on his last level, which was drawn today.  He is on a drug Lonsurf, which is a new  antineoplastic agent.  We will check to see if there is any interaction with his anticonvulsant.      Thank you for referring him to Neurology.      Sincerely,      MD SABRINA Singer MD             D: 2017 09:14   T: 2017 11:22   MT: lianne      Name:     TREE HOLM   MRN:      [id number]        Account:      [id number]   :      1970           Service Date: 2017      Document: [id number]

## 2017-02-02 NOTE — Clinical Note
2/2/2017       RE: Deandre Alex  [address]  [place] [address]     Dear Colleague,    Thank you for referring your patient, Deandre Alex, to the [facility] CANCER CLINIC. Please see a copy of my visit note below.    Mr. Deandre Alex is a 45-year-old man seen in followup of metastatic rectal cancer involving the brain.  He had long term control on Avastin, but recently progressed within the lung. Lonsurf was added to his regimen.   I am seeing him prior to cycle 2 to assess toxicity.     He was initially diagnosed with primary rectal cancer with metastatic disease involving the liver.  He was treated with chemoradiation with a complete response to his liver lesions.  He then developed lung metastases soon after  finishing his original adjuvant treatment and resection of his primary.  While receiving adjuvant irinotecan, he developed additional metastases in the lung, both of which were resected.  He developed progressive right-sided weakness and numbness and tingling in 09/2004 and was found to have a metastatic brain lesion in the left frontal lobe.  He underwent Gamma Knife and was followed without progression until 11/2014 when he had progression.  He had a craniotomy and tumor resection at that time.  In 02/2015, an MRI showed an increased left parietal lesion.  This was resected and he proceeded on to Gamma Knife.  He had increased seizure frequency and brain MRI in 09/2015 showed a left frontoparietal lesion that was increased.  Surgical resection was deferred due to concerns with worsening neurologic impairment.  He initiated monthly Avastin on 09/23/2015.  After starting Avastin his seizures stopped. Lonsurf initiated on 1/9/17.    Interval history: Deandre has been feeling well. Recently travelled to [place]. Has been a little more fatigued and had a little more nausea while on Lonsurf. Otherwise feels he tolerated it well. With the addition of zofran, the nausea was well controlled. Has ongoing  "right sided partial seizures, sometimes a few/month, sometimes up to several times a month. Usually, this happens when is in a meeting. He has followed with Dr. Richardson recently for this.     Bowels are regular. No coughing or shortness of breath. No chest pain. No palpitations. No fevers/chills.     Current Outpatient Prescriptions   Medication Sig     LORazepam (ATIVAN) 0.5 MG tablet 2 tabs q 8 hrs as needed for seizures     levETIRAcetam 1000 MG TABS Take 2 tabs (2000mg) by mouth twice daily     lacosamide (VIMPAT) 200 MG TABS tablet Take 1.5 tabs (300mg) by mouth twice daily     baclofen (LIORESAL) 10 MG tablet Take 0.5 tablets (5 mg) by mouth 3 times daily (Patient taking differently: Take 5 mg by mouth 2 times daily )     ondansetron (ZOFRAN-ODT) 8 MG ODT tab Take 1 tablet (8 mg) by mouth every 8 hours as needed for nausea     Trifluridine-Tipiracil (LONSURF) 15-6.14 MG tablet Take 2 tablets by mouth 2 times daily Take within 1 hour after morning and evening meals on Days 1 thru 5 and 8 thru 12 of each 28 day cycle     Trifluridine-Tipiracil (LONSURF) 20-8.19 MG tablet Take 2 tablets by mouth 2 times daily within 1 hour after morning and evening meals on Days 1 thru 5 and 8 thru 12 of each 28 day cycle.     Current Facility-Administered Medications   Medication     heparin 100 UNIT/ML injection 5 mL     Facility-Administered Medications Ordered in Other Visits   Medication     labetalol (NORMODYNE,TRANDATE) injection     Exam:  Alert, appears well. Blood pressure 111/76, pulse 74, temperature 98  F (36.7  C), temperature source Oral, resp. rate 18, height 1.803 m (5' 10.98\"), weight 79.379 kg (175 lb), SpO2 100 %.  Oropharynx is moist, no focal lesion. No icterus. Neck supple and without adenopathy. Lungs:CTA. Heart:RRR, no murmur or rub. Abdomen: soft, nontender, BS active. Ostomy intact. Extremities: warm, no edema. Right sided weakness noted. Wearing a leg brace.    Labs:  Results for TREE HOLM (MRN " [id number]) as of 2/2/2017 17:25   Ref. Range 2/2/2017 16:25   Sodium Latest Ref Range: 133-144 mmol/L 141   Potassium Latest Ref Range: 3.4-5.3 mmol/L 3.6   Chloride Latest Ref Range:  mmol/L 105   Carbon Dioxide Latest Ref Range: 20-32 mmol/L 27   Urea Nitrogen Latest Ref Range: 7-30 mg/dL 13   Creatinine Latest Ref Range: 0.66-1.25 mg/dL 0.81   GFR Estimate Latest Ref Range: >60 mL/min/1.7m2 >90...   GFR Estimate If Black Latest Ref Range: >60 mL/min/1.7m2 >90...   Calcium Latest Ref Range: 8.5-10.1 mg/dL 8.5   Anion Gap Latest Ref Range: 3-14 mmol/L 9   Albumin Latest Ref Range: 3.4-5.0 g/dL 3.8   Protein Total Latest Ref Range: 6.8-8.8 g/dL 6.9   Bilirubin Total Latest Ref Range: 0.2-1.3 mg/dL 0.8   Alkaline Phosphatase Latest Ref Range:  U/L 105   ALT Latest Ref Range: 0-70 U/L 30   AST Latest Ref Range: 0-45 U/L 30   Glucose Latest Ref Range: 70-99 mg/dL 79   WBC Latest Ref Range: 4.0-11.0 10e9/L 2.5 (L)   Hemoglobin Latest Ref Range: 13.3-17.7 g/dL 14.6   Hematocrit Latest Ref Range: 40.0-53.0 % 40.2   Platelet Count Latest Ref Range: 150-450 10e9/L 185   RBC Count Latest Ref Range: 4.4-5.9 10e12/L 4.20 (L)   MCV Latest Ref Range:  fl 96   MCH Latest Ref Range: 26.5-33.0 pg 34.8 (H)   MCHC Latest Ref Range: 31.5-36.5 g/dL 36.3   RDW Latest Ref Range: 10.0-15.0 % 16.4 (H)   Diff Method Unknown Automated Method   % Neutrophils Latest Units: % 53.4   % Lymphocytes Latest Units: % 27.8   % Monocytes Latest Units: % 14.7   % Eosinophils Latest Units: % 2.9   % Basophils Latest Units: % 0.4   % Immature Granulocytes Latest Units: % 0.8   Nucleated RBCs Latest Ref Range: 0 /100 0   Absolute Neutrophil Latest Ref Range: 1.6-8.3 10e9/L 1.3 (L)   Absolute Lymphocytes Latest Ref Range: 0.8-5.3 10e9/L 0.7 (L)   Absolute Monocytes Latest Ref Range: 0.0-1.3 10e9/L 0.4   Absolute Eosinophils Latest Ref Range: 0.0-0.7 10e9/L 0.1   Absolute Basophils Latest Ref Range: 0.0-0.2 10e9/L 0.0   Abs Immature  Granulocytes Latest Ref Range: 0-0.4 10e9/L 0.0   Absolute Nucleated RBC Unknown 0.0     Impression/plan:  1. Metastatic rectal cancer involving the brain and lungs. Initiated lonsurf 1/9/17, remains on Avastin 10 mg/kg monthly.  ANC is marginal today. Unsure whether he will have further decline. Will recheck CBCDP on Monday 2/6/16. If ANC has improved, will continue Lonsurf without dose adjustment, but will add Neulasta on day 15.    2. Seizures- continues to have a few to several partial seizures per month. Follows with . Remains on Ativan 1 mg Q 8 hours prn seizure, Vimpat 300 mg bid, levetiracetam 2000 mg bid.    3. Nausea: secondary to Lonsurf. Improved with zofran. Refill sent to [facility].    Addendum: 2/6 ANC is still 1.3. Will hold Lonsurf x 1 week and recheck 2/13/17      Again, thank you for allowing me to participate in the care of your patient.      Sincerely,    CHELY Lorenzo CNP

## 2017-02-02 NOTE — PROGRESS NOTES
Infusion Nursing Note:  Deandre Alex presents today for Cycle 2 Day 1 Avastin.    Patient seen by provider today: Yes: Nicky Dorantes NP    Intravenous Access:  Implanted Port.    Treatment Conditions:  HGB     14.6   2/2/2017  WBC      2.5   2/2/2017   ANEU      1.3   2/2/2017  PLT      185   2/2/2017     NA      141   2/2/2017                POTASSIUM      3.6   2/2/2017        MAG      2.2   3/4/2015         CR     0.81   2/2/2017                FAYE      8.5   2/2/2017             BILITOTAL      0.8   2/2/2017        ALBUMIN      3.8   2/2/2017                 ALT       30   2/2/2017        AST       30   2/2/2017  Urine Protein 10 (Trace) today.  BP: 111/76  Nicky Dorantes NP aware of patient's labs today.    2/2/17 1730 TORB:  Nicky Dorantes NP/Chance Ortiz RN  - Will hold Lonsurf for today, patient will return Monday to have his labs rechecked  - OK to give Avastin today.     Post Infusion Assessment:  Patient tolerated infusion without incident.  Blood return noted pre and post infusion.  Access discontinued per protocol.    Discharge Plan:   Patient declined prescription refills.  Discharge instructions reviewed with: Patient.  Patient and/or family verbalized understanding of discharge instructions and all questions answered.  AVS to patient via [employer].  Patient will return Monday for lab recheck.  Patient discharged in stable condition accompanied by: self.  Departure Mode: Ambulatory.    CHANCE ORTIZ RN

## 2017-02-02 NOTE — NURSING NOTE
Chief Complaint   Patient presents with     Port Draw     Patient here for labs to be obtained via his port by an RN. Vitals charted and patient checked into his appt.       Heena Holden RN

## 2017-02-02 NOTE — NURSING NOTE
"Deandre Alex is a 46 year old male who presents for:  Chief Complaint   Patient presents with     Port Draw     Patient here for labs to be obtained via his port by an RN. Vitals charted and patient checked into his appt.     Oncology Clinic Visit     Rectal Ca        Initial Vitals:  /76 mmHg  Pulse 74  Temp(Src) 98  F (36.7  C) (Oral)  Resp 18  Ht 1.803 m (5' 10.98\")  Wt 79.379 kg (175 lb)  BMI 24.42 kg/m2  SpO2 100% Estimated body mass index is 24.42 kg/(m^2) as calculated from the following:    Height as of this encounter: 1.803 m (5' 10.98\").    Weight as of this encounter: 79.379 kg (175 lb).. Body surface area is 1.99 meters squared. BP completed using cuff size: NA (Not Taken)  No Pain (0) No LMP for male patient. Allergies and medications reviewed.     Medications: MEDICATION REFILLS NEEDED TODAY.  Pharmacy name entered into [employer]: [id number] [address] [place] [address]    Comments: Lonsurf and Ondansetron     7 minutes for nursing intake (face to face time)   Carey Adams LPN          "

## 2017-02-02 NOTE — MR AVS SNAPSHOT
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)    -  1     Malignant neoplasm metastatic to lung, unspecified laterality (H)         Metastasis to brain (H)         Rectal cancer (H)            Follow-ups after your visit        Your next 10 appointments already scheduled     Feb 06, 2017  7:00 AM   [facility] Draw with  [facility] LAB DRAW   [facility] ([facility])    [address] [facility]  2nd [facility] [address]   [phone]            Mar 06, 2017  2:15 PM   Lab with  LAB   [facility] ([facility])    [address] [place] [address]   [phone]            Mar 06, 2017  2:40 PM   (Arrive by 2:25 PM)   CT CHEST ABDOMEN PELVIS W/O & W CONTRAST with UCCT1   [facility] CT ([facility])    [address] [address]   [phone]           Please bring any scans or X-rays taken at other hospitals, if similar tests were done. Also bring a list of your medicines, including vitamins, minerals and over-the-counter drugs. It is safest to leave personal items at home.  Be sure to tell your doctor:   If you have any allergies.   If there s any chance you are pregnant.   If you are breastfeeding.   If you have any special needs.  You may have contrast for this exam. To prepare:   Do not eat or drink for 2 hours before your exam. If you need to take medicine, you may take it with small sips of water. (We may ask you to take liquid medicine as well.)   The day before your exam, drink extra fluids at least six 8-ounce glasses (unless your doctor tells  you to restrict your fluids).  Patients over 70 or patients with diabetes or kidney problems:   If you haven t had a blood test (creatinine test) within the last 30 days, go to your clinic or Diagnostic Imaging Department for this test.  If you have diabetes:   If your kidney function is normal, continue taking your metformin (Avandamet, Glucophage, Glucovance, Metaglip) on the day of your exam.   If your kidney function is abnormal, wait 48 hours before restarting this medicine.  You will have oral contrast for this exam:   You will drink the contrast at home. Get this from your clinic or Diagnostic Imaging Department. Please follow the directions given.  Please wear loose clothing, such as a sweat suit or jogging clothes. Avoid snaps, zippers and other metal. We may ask you to undress and put on a hospital gown.  If you have any questions, please call the Imaging Department where you will have your exam.            Mar 06, 2017  4:00 PM   (Arrive by 3:45 PM)   MR BRAIN W/O & W CONTRAST with [address] MRI ([facility])    [address] [place] [address] [phone]           Take your medicines as usual, unless your doctor tells you not to. Bring a list of your current medicines to your exam (including vitamins, minerals and over-the-counter drugs).  You will be given intravenous contrast for this exam. To prepare:   The day before your exam, drink extra fluids at least six 8-ounce glasses (unless your doctor tells you to restrict your fluids).   Have a blood test (creatinine test) within 30 days of your exam. Go to your clinic or Diagnostic Imaging Department for this test.  The MRI machine uses a strong magnet. Please wear clothes without metal (snaps, zippers). A sweatsuit works well, or we may give you a hospital gown.  Please remove any body piercings and hair extensions before you arrive. You will also remove watches, jewelry,  hairpins, wallets, dentures, partial dental plates and hearing aids. You may wear contact lenses, and you may be able to wear your rings. We have a safe place to keep your personal items, but it is safer to leave them at home.   **IMPORTANT** THE INSTRUCTIONS BELOW ARE ONLY FOR THOSE PATIENTS WHO HAVE BEEN TOLD THEY WILL RECEIVE SEDATION OR GENERAL ANESTHESIA DURING THEIR MRI PROCEDURE:  IF YOU WILL RECEIVE SEDATION (take medicine to help you relax during your exam):   You must get the medicine from your doctor before you arrive. Bring the medicine to the exam. Do not take it at home.   Arrive one hour early. Bring someone who can take you home after the test. Your medicine will make you sleepy. After the exam, you may not drive, take a bus or take a taxi by yourself.   No eating 8 hours before your exam. You may have clear liquids up until 4 hours before your exam. (Clear liquids include water, clear tea, black coffee and fruit juice without pulp.)  IF YOU WILL RECEIVE ANESTHESIA (be asleep for your exam):   Arrive 1 1/2 hours early. Bring someone who can take you home after the test. You may not drive, take a bus or take a taxi by yourself.   No eating 8 hours before your exam. You may have clear liquids up until 4 hours before your exam. (Clear liquids include water, clear tea, black coffee and fruit juice without pulp.)  Please call the Imaging Department at your exam site with any questions.            Mar 06, 2017  6:00 PM   (Arrive by 5:45 PM)   Return Visit with Willis Ramon MD   [facility] ([facility])    [address] [place] [address]   [phone]            Mar 13, 2017  4:15 PM   (Arrive by 4:00 PM)   Return Visit with Jewel Blanchard MD   [facility] ([facility])    [address] [place] [address]   [phone]            Apr 28, 2017  8:00 AM  "  (Arrive by 7:45 AM)   Return Seizure with Erasto Richardson MD   [facility] ([facility])    [address] [facility]  3rd Floor  [facility] [address] [phone]              Future tests that were ordered for you today     Open Future Orders        Priority Expected Expires Ordered    CBC with platelets differential Routine 2/6/2017 6/4/2017 2/2/2017            Who to contact     If you have questions or need follow up information about today's clinic visit or your schedule please contact [facility] directly at [phone].  Normal or non-critical lab and imaging results will be communicated to you by evolsohart, letter or phone within 4 business days after the clinic has received the results. If you do not hear from us within 7 days, please contact the clinic through evolsohart or phone. If you have a critical or abnormal lab result, we will notify you by phone as soon as possible.  Submit refill requests through [employer] or call your pharmacy and they will forward the refill request to us. Please allow 3 business days for your refill to be completed.          Additional Information About Your Visit        evolsohart Information     [employer]stream gives you secure access to your electronic health record. If you see a primary care provider, you can also send messages to your care team and make appointments. If you have questions, please call your primary care clinic.  If you do not have a primary care provider, please call [phone] and they will assist you.        Care EveryWhere ID     This is your Care EveryWhere ID. This could be used by other organizations to access your [place] medical records  [id number]        Your Vitals Were     Pulse Temperature Respirations Height BMI (Body Mass Index) Pulse Oximetry    74 98  F (36.7  C) (Oral) 18 1.803 m (5' 10.98\") 24.42 kg/m2 100%       Blood Pressure from Last 3 Encounters:   02/02/17 111/76   01/27/17 113/78   01/02/17 " 132/88    Weight from Last 3 Encounters:   02/02/17 79.379 kg (175 lb)   01/27/17 75.161 kg (165 lb 11.2 oz)   01/02/17 77.928 kg (171 lb 12.8 oz)              We Performed the Following     CBC with platelets differential     Comprehensive metabolic panel          Today's Medication Changes          These changes are accurate as of: 2/2/17  5:29 PM.  If you have any questions, ask your nurse or doctor.               These medicines have changed or have updated prescriptions.        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     Thank you for choosing [facility]  for your care. Our goal is always to provide you with excellent care. Hearing back from our patients is one way we can continue to improve our services. Please take a few minutes to complete the written survey that you may receive in the mail after your visit with us. Thank you!             Your Updated Medication List - Protect others around you: Learn how to safely use, store and throw away your medicines at www.disposemymeds.org.          This list is accurate as of: 2/2/17  5:29 PM.  Always use your most recent med list.                   Brand Name Dispense Instructions for use    baclofen 10 MG tablet    LIORESAL    90 tablet    Take 0.5 tablets (5 mg) by mouth 3 times daily        lacosamide 200 MG Tabs tablet    VIMPAT    30 tablet    Take 1.5 tabs (300mg) by mouth twice daily       levETIRAcetam 1000 MG Tabs     120 tablet    Take 2 tabs (2000mg) by mouth twice daily       LORazepam 0.5 MG tablet    ATIVAN    60 tablet    2 tabs q 8 hrs as needed for seizures       ondansetron 8 MG ODT tab    ZOFRAN-ODT    30 tablet    Take 1 tablet (8 mg) by mouth every 8 hours as needed for nausea       * Trifluridine-Tipiracil 15-6.14 MG tablet    LONSURF    40 tablet    Take 2 tablets by mouth 2 times daily Take within 1 hour after morning and evening meals on Days 1 thru 5 and 8 thru 12 of each 28 day cycle       * Trifluridine-Tipiracil 20-8.19 MG tablet    LONSURF    40 tablet    Take 2 tablets by mouth 2 times daily within 1 hour after morning and evening meals on Days 1 thru 5 and 8 thru 12 of each 28 day cycle.       * Notice:  This list has 2 medication(s) that are the same as other medications prescribed for you. Read the directions carefully, and ask your doctor or other care provider to review them with you.

## 2017-02-02 NOTE — PROGRESS NOTES
Mr. Deandre Alex is a 45-year-old man seen in followup of metastatic rectal cancer involving the brain.  He had long term control on Avastin, but recently progressed within the lung. Lonsurf was added to his regimen.   I am seeing him prior to cycle 2 to assess toxicity.     He was initially diagnosed with primary rectal cancer with metastatic disease involving the liver.  He was treated with chemoradiation with a complete response to his liver lesions.  He then developed lung metastases soon after  finishing his original adjuvant treatment and resection of his primary.  While receiving adjuvant irinotecan, he developed additional metastases in the lung, both of which were resected.  He developed progressive right-sided weakness and numbness and tingling in 09/2004 and was found to have a metastatic brain lesion in the left frontal lobe.  He underwent Gamma Knife and was followed without progression until 11/2014 when he had progression.  He had a craniotomy and tumor resection at that time.  In 02/2015, an MRI showed an increased left parietal lesion.  This was resected and he proceeded on to Gamma Knife.  He had increased seizure frequency and brain MRI in 09/2015 showed a left frontoparietal lesion that was increased.  Surgical resection was deferred due to concerns with worsening neurologic impairment.  He initiated monthly Avastin on 09/23/2015.  After starting Avastin his seizures stopped. Lonsurf initiated on 1/9/17.    Interval history: Deandre has been feeling well. Recently travelled to [facility]. Has been a little more fatigued and had a little more nausea while on Lonsurf. Otherwise feels he tolerated it well. With the addition of zofran, the nausea was well controlled. Has ongoing right sided partial seizures, sometimes a few/month, sometimes up to several times a month. Usually, this happens when is in a meeting. He has followed with Dr. Richardson recently for this.     Bowels are regular. No coughing or shortness of  "breath. No chest pain. No palpitations. No fevers/chills.     Current Outpatient Prescriptions   Medication Sig     LORazepam (ATIVAN) 0.5 MG tablet 2 tabs q 8 hrs as needed for seizures     levETIRAcetam 1000 MG TABS Take 2 tabs (2000mg) by mouth twice daily     lacosamide (VIMPAT) 200 MG TABS tablet Take 1.5 tabs (300mg) by mouth twice daily     baclofen (LIORESAL) 10 MG tablet Take 0.5 tablets (5 mg) by mouth 3 times daily (Patient taking differently: Take 5 mg by mouth 2 times daily )     ondansetron (ZOFRAN-ODT) 8 MG ODT tab Take 1 tablet (8 mg) by mouth every 8 hours as needed for nausea     Trifluridine-Tipiracil (LONSURF) 15-6.14 MG tablet Take 2 tablets by mouth 2 times daily Take within 1 hour after morning and evening meals on Days 1 thru 5 and 8 thru 12 of each 28 day cycle     Trifluridine-Tipiracil (LONSURF) 20-8.19 MG tablet Take 2 tablets by mouth 2 times daily within 1 hour after morning and evening meals on Days 1 thru 5 and 8 thru 12 of each 28 day cycle.     Current Facility-Administered Medications   Medication     heparin 100 UNIT/ML injection 5 mL     Facility-Administered Medications Ordered in Other Visits   Medication     labetalol (NORMODYNE,TRANDATE) injection     Exam:  Alert, appears well. Blood pressure 111/76, pulse 74, temperature 98  F (36.7  C), temperature source Oral, resp. rate 18, height 1.803 m (5' 10.98\"), weight 79.379 kg (175 lb), SpO2 100 %.  Oropharynx is moist, no focal lesion. No icterus. Neck supple and without adenopathy. Lungs:CTA. Heart:RRR, no murmur or rub. Abdomen: soft, nontender, BS active. Ostomy intact. Extremities: warm, no edema. Right sided weakness noted. Wearing a leg brace.    Labs:  Results for TARA HOLMN GERI (MRN [id number]) as of 2/2/2017 17:25   Ref. Range 2/2/2017 16:25   Sodium Latest Ref Range: 133-144 mmol/L 141   Potassium Latest Ref Range: 3.4-5.3 mmol/L 3.6   Chloride Latest Ref Range:  mmol/L 105   Carbon Dioxide Latest Ref Range: 20-32 " mmol/L 27   Urea Nitrogen Latest Ref Range: 7-30 mg/dL 13   Creatinine Latest Ref Range: 0.66-1.25 mg/dL 0.81   GFR Estimate Latest Ref Range: >60 mL/min/1.7m2 >90...   GFR Estimate If Black Latest Ref Range: >60 mL/min/1.7m2 >90...   Calcium Latest Ref Range: 8.5-10.1 mg/dL 8.5   Anion Gap Latest Ref Range: 3-14 mmol/L 9   Albumin Latest Ref Range: 3.4-5.0 g/dL 3.8   Protein Total Latest Ref Range: 6.8-8.8 g/dL 6.9   Bilirubin Total Latest Ref Range: 0.2-1.3 mg/dL 0.8   Alkaline Phosphatase Latest Ref Range:  U/L 105   ALT Latest Ref Range: 0-70 U/L 30   AST Latest Ref Range: 0-45 U/L 30   Glucose Latest Ref Range: 70-99 mg/dL 79   WBC Latest Ref Range: 4.0-11.0 10e9/L 2.5 (L)   Hemoglobin Latest Ref Range: 13.3-17.7 g/dL 14.6   Hematocrit Latest Ref Range: 40.0-53.0 % 40.2   Platelet Count Latest Ref Range: 150-450 10e9/L 185   RBC Count Latest Ref Range: 4.4-5.9 10e12/L 4.20 (L)   MCV Latest Ref Range:  fl 96   MCH Latest Ref Range: 26.5-33.0 pg 34.8 (H)   MCHC Latest Ref Range: 31.5-36.5 g/dL 36.3   RDW Latest Ref Range: 10.0-15.0 % 16.4 (H)   Diff Method Unknown Automated Method   % Neutrophils Latest Units: % 53.4   % Lymphocytes Latest Units: % 27.8   % Monocytes Latest Units: % 14.7   % Eosinophils Latest Units: % 2.9   % Basophils Latest Units: % 0.4   % Immature Granulocytes Latest Units: % 0.8   Nucleated RBCs Latest Ref Range: 0 /100 0   Absolute Neutrophil Latest Ref Range: 1.6-8.3 10e9/L 1.3 (L)   Absolute Lymphocytes Latest Ref Range: 0.8-5.3 10e9/L 0.7 (L)   Absolute Monocytes Latest Ref Range: 0.0-1.3 10e9/L 0.4   Absolute Eosinophils Latest Ref Range: 0.0-0.7 10e9/L 0.1   Absolute Basophils Latest Ref Range: 0.0-0.2 10e9/L 0.0   Abs Immature Granulocytes Latest Ref Range: 0-0.4 10e9/L 0.0   Absolute Nucleated RBC Unknown 0.0     Impression/plan:  1. Metastatic rectal cancer involving the brain and lungs. Initiated lonsurf 1/9/17, remains on Avastin 10 mg/kg monthly.  ANC is marginal  today. Unsure whether he will have further decline. Will recheck CBCDP on Monday 2/6/16. If ANC has improved, will continue Lonsurf without dose adjustment, but will add Neulasta on day 15.    2. Seizures- continues to have a few to several partial seizures per month. Follows with . Remains on Ativan 1 mg Q 8 hours prn seizure, Vimpat 300 mg bid, levetiracetam 2000 mg bid.    3. Nausea: secondary to Lonsurf. Improved with zofran. Refill sent to [facility].    Addendum: 2/6 ANC is still 1.3. Will hold Lonsurf x 1 week and recheck 2/13/17

## 2017-02-06 NOTE — NURSING NOTE
Chief Complaint   Patient presents with     Port Draw     Labs collected via port by RN.      Labs collected via port, port flushed with NS and Heparin.   Ebony Christensen RN

## 2017-02-13 NOTE — NURSING NOTE
Chief Complaint   Patient presents with     Port Draw     labs drawn from port by RN     No provider visit scheduled, patient requesting to get blood results before he leaves for work  Message sent to Nicky Dorantes regarding this request.  Port accessed, labs drawn, flushed with NS & heparin, then de-accessed.    Mary Ellen Clark RN

## 2017-02-13 NOTE — PROGRESS NOTES
Labs reviewed.    Ok to proceed with cycle 2 Lonsurf today. Script sent to pharmacy. Will hold on Neulasta. Has f/u with Dr. Ramon in a couple weeks with restaging. Will evaluate counts at that time. TW

## 2017-02-16 NOTE — TELEPHONE ENCOUNTER
Oral Chemotherapy Monitoring Program    Placed call to patient in follow up of Lonsurf therapy.    Left message to please call back in follow up of therapy. No patient or drug names were mentioned.    Clinton Bryan,  Pharmacy Intern  [facility]

## 2017-02-24 NOTE — ORAL ONC MGMT
Oral Chemotherapy Monitoring Program      Placed call to patient in follow up of Lonsurf therapy.     Left message to please call back in follow-up of therapy. No patient or drug names were mentioned.     Tye HUNTER.Ph.  [facility]  Oral Chemotherapy Program  [phone]

## 2017-02-28 NOTE — ORAL ONC MGMT
Deandre called today regarding the possible need for labs.  Eitan last labs were 02/13/2017.  He has a lab appointment scheduled on 03/06/2017 which will be soon enough.      Tye HUNTER.Ph.  [facility]  Oral Chemotherapy Program  [phone]

## 2017-03-06 NOTE — NURSING NOTE
"Deandre Alex is a 46 year old male who presents for:  Chief Complaint   Patient presents with     Port Draw     Labs collected via port by RN.     Oncology Clinic Visit     Return for Rectal Ca , CT, MRI        Initial Vitals:  /81  Pulse 69  Temp 98.2  F (36.8  C) (Oral)  Resp 16  Wt 78.3 kg (172 lb 11.2 oz)  SpO2 98%  BMI 24.1 kg/m2 Estimated body mass index is 24.1 kg/(m^2) as calculated from the following:    Height as of 2/2/17: 1.803 m (5' 10.98\").    Weight as of this encounter: 78.3 kg (172 lb 11.2 oz).. Body surface area is 1.98 meters squared. BP completed using cuff size: NA (Not Taken)  No Pain (0) No LMP for male patient. Allergies and medications reviewed.     Medications: Medication refills not needed today.  Pharmacy name entered into [facility]: [facility]/PHARMACY #[id number] - [facility] [address] [place] [address]    Comments:     6  minutes for nursing intake (face to face time)   Karen Sanchez MA        "

## 2017-03-06 NOTE — MR AVS SNAPSHOT
After Visit Summary   3/6/2017    Deandre Alex    MRN: [id number]           Patient Information     Date Of Birth          1970        Visit Information        Provider Department      3/6/2017 6:00 PM Willis Ramon MD [facility]         Follow-ups after your visit        Your next 10 appointments already scheduled     Jan 27, 2017  8:00 AM   (Arrive by 7:45 AM)   Return Seizure with Erasto Richardson MD   [facility] ([facility])    [address] [place] [address]  3rd [facility] [address]   [phone]            Mar 06, 2017  2:15 PM   Lab with  LAB   [facility] ([facility])    [address] [place] [id number] [phone]            Mar 06, 2017  2:40 PM   (Arrive by 2:25 PM)   CT CHEST ABDOMEN PELVIS W/O & W CONTRAST with UCCT1   [facility] ([facility])    [address] [place] [id number] [phone]           Please bring any scans or X-rays taken at other hospitals, if similar tests were done. Also bring a list of your medicines, including vitamins, minerals and over-the-counter drugs. It is safest to leave personal items at home.  Be sure to tell your doctor:   If you have any allergies.   If there s any chance you are pregnant.   If you are breastfeeding.   If you have any special needs.  You may have contrast for this exam. To prepare:   Do not eat or drink for 2 hours before your exam. If you need to take medicine, you may take it with small sips of water. (We may ask you to take liquid medicine as well.)   The day before your exam, drink extra fluids at least six 8-ounce glasses (unless your doctor tells you to restrict your fluids).  Patients over 70 or patients with diabetes or kidney problems:   If you haven t had a blood test (creatinine test) within the last 30 days, go to your clinic or  Diagnostic Imaging Department for this test.  If you have diabetes:   If your kidney function is normal, continue taking your metformin (Avandamet, Glucophage, Glucovance, Metaglip) on the day of your exam.   If your kidney function is abnormal, wait 48 hours before restarting this medicine.  You will have oral contrast for this exam:   You will drink the contrast at home. Get this from your clinic or Diagnostic Imaging Department. Please follow the directions given.  Please wear loose clothing, such as a sweat suit or jogging clothes. Avoid snaps, zippers and other metal. We may ask you to undress and put on a hospital gown.  If you have any questions, please call the Imaging Department where you will have your exam.            Mar 06, 2017  6:00 PM   (Arrive by 5:45 PM)   Return Visit with Willis Ramon MD   [facility] ([facility])    [address] [address] [phone]              Who to contact     If you have questions or need follow up information about today's clinic visit or your schedule please contact [facility] directly at [phone].  Normal or non-critical lab and imaging results will be communicated to you by MyChart, letter or phone within 4 business days after the clinic has received the results. If you do not hear from us within 7 days, please contact the clinic through MyChart or phone. If you have a critical or abnormal lab result, we will notify you by phone as soon as possible.  Submit refill requests through [employer] or call your pharmacy and they will forward the refill request to us. Please allow 3 business days for your refill to be completed.          Additional Information About Your Visit        [employer] Information     [employer] gives you secure access to your electronic health record. If you see a primary care provider, you can also send messages to your care team and make  appointments. If you have questions, please call your primary care clinic.  If you do not have a primary care provider, please call [phone] and they will assist you.        Care EveryWhere ID     This is your Care EveryWhere ID. This could be used by other organizations to access your [facility] medical records  [id number]         Blood Pressure from Last 3 Encounters:   01/02/17 132/88   01/02/17 132/88   12/05/16 131/83    Weight from Last 3 Encounters:   01/02/17 77.928 kg (171 lb 12.8 oz)   12/05/16 79.969 kg (176 lb 4.8 oz)   11/21/16 77.4 kg (170 lb 10.2 oz)              Today, you had the following     No orders found for display       Primary Care Provider Office Phone # Fax #    Erasto Richardson [phone]        [facility] [address] [facility] [address]  [facility] [address]        Thank you!     Thank you for choosing [facility]  for your care. Our goal is always to provide you with excellent care. Hearing back from our patients is one way we can continue to improve our services. Please take a few minutes to complete the written survey that you may receive in the mail after your visit with us. Thank you!             Your Updated Medication List - Protect others around you: Learn how to safely use, store and throw away your medicines at www.disposemymeds.org.          This list is accurate as of: 1/9/17 11:36 AM.  Always use your most recent med list.                   Brand Name Dispense Instructions for use    baclofen 10 MG tablet    LIORESAL    90 tablet    Take 0.5 tablets (5 mg) by mouth 3 times daily       lacosamide 200 MG Tabs tablet    VIMPAT    90 tablet    Take 1.5 tabs (300mg) by mouth twice daily       levETIRAcetam 1000 MG Tabs     120 tablet    Take 2 tabs (2000mg) by mouth twice daily       LORazepam 0.5 MG tablet    ATIVAN    60 tablet    Take 1 tablet (0.5 mg) by mouth every 8 hours as needed for anxiety       * Trifluridine-Tipiracil 15-6.14 MG tablet     LONSURF    40 tablet    Take 2 tablets by mouth 2 times daily Take within 1 hour after morning and evening meals on Days 1 thru 5 and 8 thru 12 of each 28 day cycle       * Trifluridine-Tipiracil 20-8.19 MG tablet    LONSURF    40 tablet    Take 2 tablets by mouth 2 times daily within 1 hour after morning and evening meals on Days 1 thru 5 and 8 thru 12 of each 28 day cycle.       * Notice:  This list has 2 medication(s) that are the same as other medications prescribed for you. Read the directions carefully, and ask your doctor or other care provider to review them with you.

## 2017-03-06 NOTE — Clinical Note
3/6/2017       RE: Deandre Alex  [address]  [place] [address]     Dear Colleague,    Thank you for referring your patient, Deandre Alxe, to the [facility]. Please see a copy of my visit note below.    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  He is currently on active treatment with Avastin and tipiracil and is here today for his first response assessment.  He tells me things are going great for him.  He has had fewer seizures this month, he thinks just two.  His functioning of his left arm and leg is a little bit erratic, but he does not think much different than has been typical for him as of late, although his wife thinks it may be a little bit worse.  He continues to work full-time.  He does not have any other significant symptoms.  His appetite is good.  His energy is good, although he fatigues more easily than he would like.  His spirits are quite good.  The remainder of a 10-point complete review of systems is otherwise unremarkable.       PHYSICAL EXAMINATION:   GENERAL:  Mr. Alex appears quite well.   VITAL SIGNS:  His vital signs are noted in the chart and are unremarkable.   HEENT:  He has no icterus.  He has no visible lesion in the oropharynx.   NECK:  He has no palpable adenopathy in the neck or supraclavicular spaces.   LUNGS:  His lungs are clear to auscultation without dullness to percussion.   HEART:  His heart rate and rhythm are regular.   ABDOMEN:  His abdomen is soft and nontender without palpable mass or organomegaly.   EXTREMITIES:  He has no peripheral edema.  There is no tenderness in the calves or thighs.   NEUROLOGIC:  His speech is fluent.  He has a slightly ataxic gait and a little bit of dysmetria with his left arm.  Otherwise, his neurologic exam is not too abnormal.      RESULTS:  I reviewed with him a CT scan of his chest and a brain MR.  I do not yet have the radiologist's  interpretation of the brain scan which I think looks grossly the same.  On his body CT, his lung nodules are all the same to better.  There are no new sites of disease.  His current lab work shows normal electrolytes, normal renal function and normal liver enzymes with a marginally elevated bilirubin.  He has mild cytopenias consistent with his recent chemotherapy.       ASSESSMENT:  Colorectal cancer metastatic to brain and lung.  He currently has excellent disease control on his current regimen.  I will defer to Neurosurgery who he will see next week about whether further direct intervention with regards to the brain metastasis makes sense or not, but we definitely can continue on with the systemic therapy with the tipiracil, as it seems to be controlling his cancer without too much in the way of toxicity.  We will start up again with his next cycle in about a week.  We will plan on 2 more cycles and then reassess his disease status.         Again, thank you for allowing me to participate in the care of your patient.      Sincerely,    Willis Ramon MD

## 2017-03-06 NOTE — LETTER
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  He is currently on active treatment with Avastin and tipiracil and is here today for his first response assessment.  He tells me things are going great for him.  He has had fewer seizures this month, he thinks just two.  His functioning of his left arm and leg is a little bit erratic, but he does not think much different than has been typical for him as of late, although his wife thinks it may be a little bit worse.  He continues to work full-time.  He does not have any other significant symptoms.  His appetite is good.  His energy is good, although he fatigues more easily than he would like.  His spirits are quite good.  The remainder of a 10-point complete review of systems is otherwise unremarkable.       PHYSICAL EXAMINATION:   GENERAL:  Mr. Alex appears quite well.   VITAL SIGNS:  His vital signs are noted in the chart and are unremarkable.   HEENT:  He has no icterus.  He has no visible lesion in the oropharynx.   NECK:  He has no palpable adenopathy in the neck or supraclavicular spaces.   LUNGS:  His lungs are clear to auscultation without dullness to percussion.   HEART:  His heart rate and rhythm are regular.   ABDOMEN:  His abdomen is soft and nontender without palpable mass or organomegaly.   EXTREMITIES:  He has no peripheral edema.  There is no tenderness in the calves or thighs.   NEUROLOGIC:  His speech is fluent.  He has a slightly ataxic gait and a little bit of dysmetria with his left arm.  Otherwise, his neurologic exam is not too abnormal.      RESULTS:  I reviewed with him a CT scan of his chest and a brain MR.  I do not yet have the radiologist's interpretation of the brain scan which I think looks grossly the same.  On his body CT, his lung nodules are all the same to better.  There are no new sites of  disease.  His current lab work shows normal electrolytes, normal renal function and normal liver enzymes with a marginally elevated bilirubin.  He has mild cytopenias consistent with his recent chemotherapy.       ASSESSMENT:  Colorectal cancer metastatic to brain and lung.  He currently has excellent disease control on his current regimen.  I will defer to Neurosurgery who he will see next week about whether further direct intervention with regards to the brain metastasis makes sense or not, but we definitely can continue on with the systemic therapy with the tipiracil, as it seems to be controlling his cancer without too much in the way of toxicity.  We will start up again with his next cycle in about a week.  We will plan on 2 more cycles and then reassess his disease status.         Willis Ramon MD

## 2017-03-06 NOTE — NURSING NOTE
Chief Complaint   Patient presents with     Port Draw     Labs collected via port by RN.     Port accessed with flat needle, flushed and locked with NS and Heparin.   Ebony Christensen RN

## 2017-03-07 NOTE — PROGRESS NOTES
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  He is currently on active treatment with Avastin and tipiracil and is here today for his first response assessment.  He tells me things are going great for him.  He has had fewer seizures this month, he thinks just two.  His functioning of his left arm and leg is a little bit erratic, but he does not think much different than has been typical for him as of late, although his wife thinks it may be a little bit worse.  He continues to work full-time.  He does not have any other significant symptoms.  His appetite is good.  His energy is good, although he fatigues more easily than he would like.  His spirits are quite good.  The remainder of a 10-point complete review of systems is otherwise unremarkable.       PHYSICAL EXAMINATION:   GENERAL:  Mr. Alex appears quite well.   VITAL SIGNS:  His vital signs are noted in the chart and are unremarkable.   HEENT:  He has no icterus.  He has no visible lesion in the oropharynx.   NECK:  He has no palpable adenopathy in the neck or supraclavicular spaces.   LUNGS:  His lungs are clear to auscultation without dullness to percussion.   HEART:  His heart rate and rhythm are regular.   ABDOMEN:  His abdomen is soft and nontender without palpable mass or organomegaly.   EXTREMITIES:  He has no peripheral edema.  There is no tenderness in the calves or thighs.   NEUROLOGIC:  His speech is fluent.  He has a slightly ataxic gait and a little bit of dysmetria with his left arm.  Otherwise, his neurologic exam is not too abnormal.      RESULTS:  I reviewed with him a CT scan of his chest and a brain MR.  I do not yet have the radiologist's interpretation of the brain scan which I think looks grossly the same.  On his body CT, his lung nodules are all the same to better.  There are no new sites of disease.  His current lab work shows normal electrolytes, normal renal function  and normal liver enzymes with a marginally elevated bilirubin.  He has mild cytopenias consistent with his recent chemotherapy.       ASSESSMENT:  Colorectal cancer metastatic to brain and lung.  He currently has excellent disease control on his current regimen.  I will defer to Neurosurgery who he will see next week about whether further direct intervention with regards to the brain metastasis makes sense or not, but we definitely can continue on with the systemic therapy with the tipiracil, as it seems to be controlling his cancer without too much in the way of toxicity.  We will start up again with his next cycle in about a week.  We will plan on 2 more cycles and then reassess his disease status.

## 2017-03-13 NOTE — PATIENT INSTRUCTIONS
Contact Numbers    [facility] Main Line: [phone]    Triage Nurse Line: [phone]      Please call the [facility] Nurse Triage line if you experience a temperature greater than or equal to 100.5, shaking chills, have uncontrolled nausea, vomiting and/or diarrhea, dizziness, shortness of breath, bleeding not relieved with pressure, or if you have any other questions or concerns.     If it is after hours, weekends, or holidays, please call the main hospital  at  [phone] and ask to speak to the adult Oncology doctor on call.     If you are having any concerning symptoms or wish to speak to a provider before your next infusion visit, please call your care coordinator or triage them so we can adequately serve you.      If you need to refill your narcotic prescription or other medication, please call triage before your infusion appointment.        March 2017 Sunday Monday Tuesday Wednesday Thursday Friday Saturday                  1     2     3     4       5     6     [facility] DRAW    2:15 PM   (15 min.)    [facility] DRAW   [facility] Lab Draw     CT CHEST ABDOMEN PELVIS WWO    2:25 PM   (20 min.)   [address] [place] [address] CT     MR BRAIN WWO    3:45 PM   (45 min.)   [address] [place] [address] MRI     [facility] RETURN    5:45 PM   (30 min.)   Willis Ramon MD   [facility] 7     8     9     10     11       12     13     [facility] RETURN    4:00 PM   (15 min.)   Jewel Blanchard MD   [facility] DRAW    4:30 PM   (15 min.)    [facility] DRAW   [facility] Lab Draw     P ONC INFUSION 60    5:00 PM   (60 min.)   [facility] INFUSION   [facility] 14     15     16     17     18       19     20     21     22     23     24     25       26     27     28     29     30     31 April 2017 Sunday Monday Tuesday Wednesday Thursday Friday Saturday                                  1       2     3     4     5     6     7     8       9     10     [facility]    1:30 PM   (15 min.)   [facility] LAB DRAW   [facility] Lab Draw     [facility] ONC INFUSION 60    2:00 PM   (60 min.)    ONCOLOGY INFUSION   [facility] 11     12     13     14     15       16     17     18     19     20     21     22       23     24     25     26     27     28     [facility] RETURN SEIZURE    7:45 AM   (30 min.)   Erasto Richardson MD   [facility] 29       30                                               Recent Results (from the past 24 hour(s))   *CBC with platelets differential    Collection Time: 03/13/17  5:32 PM   Result Value Ref Range    WBC 2.9 (L) 4.0 - 11.0 10e9/L    RBC Count 4.03 (L) 4.4 - 5.9 10e12/L    Hemoglobin 14.3 13.3 - 17.7 g/dL    Hematocrit 39.6 (L) 40.0 - 53.0 %    MCV 98 78 - 100 fl    MCH 35.5 (H) 26.5 - 33.0 pg    MCHC 36.1 31.5 - 36.5 g/dL    RDW 17.1 (H) 10.0 - 15.0 %    Platelet Count 200 150 - 450 10e9/L    Diff Method Automated Method     % Neutrophils 57.2 %    % Lymphocytes 22.6 %    % Monocytes 17.5 %    % Eosinophils 1.7 %    % Basophils 0.3 %    % Immature Granulocytes 0.7 %    Nucleated RBCs 0 0 /100    Absolute Neutrophil 1.7 1.6 - 8.3 10e9/L    Absolute Lymphocytes 0.7 (L) 0.8 - 5.3 10e9/L    Absolute Monocytes 0.5 0.0 - 1.3 10e9/L    Absolute Eosinophils 0.1 0.0 - 0.7 10e9/L    Absolute Basophils 0.0 0.0 - 0.2 10e9/L    Abs Immature Granulocytes 0.0 0 - 0.4 10e9/L    Absolute Nucleated RBC 0.0    Comprehensive metabolic panel    Collection Time: 03/13/17  5:32 PM   Result Value Ref Range    Sodium 141 133 - 144 mmol/L    Potassium 3.8 3.4 - 5.3 mmol/L    Chloride 106 94 - 109 mmol/L    Carbon Dioxide 27 20 - 32 mmol/L    Anion Gap 8 3 - 14 mmol/L    Glucose 83 70 - 99 mg/dL    Urea Nitrogen 13 7 - 30 mg/dL    Creatinine 0.73 0.66 - 1.25 mg/dL    GFR Estimate >90  Non  GFR Calc   >60 mL/min/1.7m2    GFR Estimate If  Black >90   GFR Calc   >60 mL/min/1.7m2    Calcium 8.6 8.5 - 10.1 mg/dL    Bilirubin Total 0.6 0.2 - 1.3 mg/dL    Albumin 3.9 3.4 - 5.0 g/dL    Protein Total 7.4 6.8 - 8.8 g/dL    Alkaline Phosphatase 113 40 - 150 U/L    ALT 18 0 - 70 U/L    AST 21 0 - 45 U/L   Protein qualitative urine    Collection Time: 03/13/17  5:45 PM   Result Value Ref Range    Protein Albumin Urine Negative NEG mg/dL

## 2017-03-13 NOTE — NURSING NOTE
"Deandre Alex is a 46 year old male who presents for:  Chief Complaint   Patient presents with     Oncology Clinic Visit     post GK        Initial Vitals:  /81  Pulse 68  Temp 97.9  F (36.6  C) (Oral)  Resp 16  Wt 80.7 kg (178 lb)  SpO2 100%  BMI 24.84 kg/m2 Estimated body mass index is 24.84 kg/(m^2) as calculated from the following:    Height as of 2/2/17: 1.803 m (5' 10.98\").    Weight as of this encounter: 80.7 kg (178 lb).. Body surface area is 2.01 meters squared. BP completed using cuff size: regular  Data Unavailable No LMP for male patient. Allergies and medications reviewed.     Medications: MEDICATION REFILLS NEEDED TODAY.  Pharmacy name entered into [employer]: [id number] - [facility] [address] [place] [address]    Comments:     6 minutes for nursing intake (face to face time)   MURRAY RAZA CMA        "

## 2017-03-13 NOTE — PROGRESS NOTES
Infusion Nursing Note:  Deandre GERI Alex presents today for C3D1 Avastin, p.o. Lonsurf    Patient seen by provider today: Yes: Seen by Dr. Blanchard in Neuro    Note: Patient reports minimal side effects from Lonsurf.  Has fatigue and some minimal nausea.  No other complains at this time.    Intravenous Access:  Implanted Port.      Treatment Conditions:  Lab Results   Component Value Date    HGB 14.3 03/13/2017     Lab Results   Component Value Date    WBC 2.9 03/13/2017      Lab Results   Component Value Date    ANEU 1.7 03/13/2017     Lab Results   Component Value Date     03/13/2017      Lab Results   Component Value Date     03/13/2017                   Lab Results   Component Value Date    POTASSIUM 3.8 03/13/2017           Lab Results   Component Value Date    MAG 2.2 03/04/2015            Lab Results   Component Value Date    CR 0.73 03/13/2017                   Lab Results   Component Value Date    FAYE 8.6 03/13/2017                Lab Results   Component Value Date    BILITOTAL 0.6 03/13/2017           Lab Results   Component Value Date    ALBUMIN 3.9 03/13/2017                    Lab Results   Component Value Date    ALT 18 03/13/2017           Lab Results   Component Value Date    AST 21 03/13/2017     Results reviewed, labs MET treatment parameters, ok to proceed with treatment.  Urine protein: negative.      Post Infusion Assessment:  Patient tolerated infusion without incident.  Blood return noted pre and post infusion.  Site patent and intact, free from redness, edema or discomfort.  No evidence of extravasations.  Access discontinued per protocol.    Discharge Plan:   Prescription refills given for Lonsurf.  Discharge instructions reviewed with: Patient and Family.  Patient and/or family verbalized understanding of discharge instructions and all questions answered.  Copy of AVS reviewed with patient and/or family.  Patient will return 4/10/17 for next appointment.  Patient discharged in stable  condition accompanied by: wife.  Departure Mode: Ambulatory.    Krista Dodge RN

## 2017-03-13 NOTE — MR AVS SNAPSHOT
After Visit Summary   3/13/2017    Deandre Alex    MRN: [id number]           Patient Information     Date Of Birth          1970        Visit Information        Provider Department      3/13/2017 5:00 PM  10 ATC;  ONCOLOGY INFUSION [facility]        Today's Diagnoses     Encounter for long-term (current) use of medications    -  1    Malignant neoplasm metastatic to lung, unspecified laterality (H)        Metastasis to brain (H)        Rectal cancer (H)          Care Instructions    Contact Numbers    [facility] Main Line: [phone]    Triage Nurse Line: [phone]      Please call the [facility] Nurse Triage line if you experience a temperature greater than or equal to 100.5, shaking chills, have uncontrolled nausea, vomiting and/or diarrhea, dizziness, shortness of breath, bleeding not relieved with pressure, or if you have any other questions or concerns.     If it is after hours, weekends, or holidays, please call the main hospital  at  [phone] and ask to speak to the adult Oncology doctor on call.     If you are having any concerning symptoms or wish to speak to a provider before your next infusion visit, please call your care coordinator or triage them so we can adequately serve you.      If you need to refill your narcotic prescription or other medication, please call triage before your infusion appointment.        March 2017 Sunday Monday Tuesday Wednesday Thursday Friday Saturday                  1     2     3     4       5     6     P [facility] DRAW    2:15 PM   (15 min.)    [facility] DRAW   [facility] Lab Draw     CT CHEST ABDOMEN PELVIS WWO    2:25 PM   (20 min.)   [address] [place] [address] CT     MR BRAIN WWO    3:45 PM   (45 min.)   [address] [place] [address] MRI     [facility] RETURN    5:45 PM   (30 min.)   Willis Ramon MD   [facility] 7     8     9     10     11       12      13     [facility] RETURN    4:00 PM   (15 min.)   Jewel Blanchard MD   [facility]     [facility] DRAW    4:30 PM   (15 min.)    [facility] DRAW   [facility] Lab Draw     UMP ONC INFUSION 60    5:00 PM   (60 min.)    ONCOLOGY INFUSION   [facility] 14     15     16     17     18       19     20     21     22     23     24     25       26     27     28     29     30     31 April 2017 Sunday Monday Tuesday Wednesday Thursday Friday Saturday                                 1       2     3     4     5     6     7     8       9     10     [facility] DRAW    1:30 PM   (15 min.)    [facility]     [facility] ONC INFUSION 60    2:00 PM   (60 min.)    ONCOLOGY INFUSION   [facility] 11     12     13     14     15       16     17     18     19     20     21     22       23     24     25     26     27     28     UMP RETURN SEIZURE    7:45 AM   (30 min.)   Erasto Richardson MD   [facility] Neurology 29       30                                               Recent Results (from the past 24 hour(s))   *CBC with platelets differential    Collection Time: 03/13/17  5:32 PM   Result Value Ref Range    WBC 2.9 (L) 4.0 - 11.0 10e9/L    RBC Count 4.03 (L) 4.4 - 5.9 10e12/L    Hemoglobin 14.3 13.3 - 17.7 g/dL    Hematocrit 39.6 (L) 40.0 - 53.0 %    MCV 98 78 - 100 fl    MCH 35.5 (H) 26.5 - 33.0 pg    MCHC 36.1 31.5 - 36.5 g/dL    RDW 17.1 (H) 10.0 - 15.0 %    Platelet Count 200 150 - 450 10e9/L    Diff Method Automated Method     % Neutrophils 57.2 %    % Lymphocytes 22.6 %    % Monocytes 17.5 %    % Eosinophils 1.7 %    % Basophils 0.3 %    % Immature Granulocytes 0.7 %    Nucleated RBCs 0 0 /100    Absolute Neutrophil 1.7 1.6 - 8.3 10e9/L    Absolute Lymphocytes 0.7 (L) 0.8 - 5.3 10e9/L    Absolute Monocytes 0.5 0.0 - 1.3 10e9/L    Absolute Eosinophils 0.1 0.0 - 0.7 10e9/L    Absolute Basophils 0.0 0.0 - 0.2  10e9/L    Abs Immature Granulocytes 0.0 0 - 0.4 10e9/L    Absolute Nucleated RBC 0.0    Comprehensive metabolic panel    Collection Time: 03/13/17  5:32 PM   Result Value Ref Range    Sodium 141 133 - 144 mmol/L    Potassium 3.8 3.4 - 5.3 mmol/L    Chloride 106 94 - 109 mmol/L    Carbon Dioxide 27 20 - 32 mmol/L    Anion Gap 8 3 - 14 mmol/L    Glucose 83 70 - 99 mg/dL    Urea Nitrogen 13 7 - 30 mg/dL    Creatinine 0.73 0.66 - 1.25 mg/dL    GFR Estimate >90  Non  GFR Calc   >60 mL/min/1.7m2    GFR Estimate If Black >90   GFR Calc   >60 mL/min/1.7m2    Calcium 8.6 8.5 - 10.1 mg/dL    Bilirubin Total 0.6 0.2 - 1.3 mg/dL    Albumin 3.9 3.4 - 5.0 g/dL    Protein Total 7.4 6.8 - 8.8 g/dL    Alkaline Phosphatase 113 40 - 150 U/L    ALT 18 0 - 70 U/L    AST 21 0 - 45 U/L   Protein qualitative urine    Collection Time: 03/13/17  5:45 PM   Result Value Ref Range    Protein Albumin Urine Negative NEG mg/dL               Follow-ups after your visit        Your next 10 appointments already scheduled     Apr 10, 2017  1:30 PM CDT   [facility] Draw with  [facility] DRAW   [facility])    [address] [place] [id number] [phone]            Apr 10, 2017  2:00 PM CDT   Infusion 60 with UC ONCOLOGY INFUSION, UC 15 ATC   [facility] ([facility])    [address] [place] [id number] [phone]            Apr 28, 2017  8:00 AM CDT   (Arrive by 7:45 AM)   Return Seizure with Erasto Richardson MD   [facility] Neurology ([facility])    [address] [place] [address]  3rd [facility] [id number] [phone]            May 08, 2017 12:00 PM CDT   [facility] Lab Draw with  [facility] LAB DRAW   [facility] Draw ([facility])    [address] [place] [id number] [phone]             May 08, 2017 12:40 PM CDT   (Arrive by 12:25 PM)   CT CHEST ABDOMEN PELVIS W/O & W CONTRAST with UCCT1   [facility] CT ([facility])    [address] [address] [phone]           Please bring any scans or X-rays taken at other hospitals, if similar tests were done. Also bring a list of your medicines, including vitamins, minerals and over-the-counter drugs. It is safest to leave personal items at home.  Be sure to tell your doctor:   If you have any allergies.   If there s any chance you are pregnant.   If you are breastfeeding.   If you have any special needs.  You may have contrast for this exam. To prepare:   Do not eat or drink for 2 hours before your exam. If you need to take medicine, you may take it with small sips of water. (We may ask you to take liquid medicine as well.)   The day before your exam, drink extra fluids at least six 8-ounce glasses (unless your doctor tells you to restrict your fluids).  Patients over 70 or patients with diabetes or kidney problems:   If you haven t had a blood test (creatinine test) within the last 30 days, go to your clinic or Diagnostic Imaging Department for this test.  If you have diabetes:   If your kidney function is normal, continue taking your metformin (Avandamet, Glucophage, Glucovance, Metaglip) on the day of your exam.   If your kidney function is abnormal, wait 48 hours before restarting this medicine.  You will have oral contrast for this exam:   You will drink the contrast at home. Get this from your clinic or Diagnostic Imaging Department. Please follow the directions given.  Please wear loose clothing, such as a sweat suit or jogging clothes. Avoid snaps, zippers and other metal. We may ask you to undress and put on a hospital gown.  If you have any questions, please call the Imaging Department where you will have your exam.            May 08, 2017  4:00 PM CDT   (Arrive by  3:45 PM)   Return Visit with Willis Ramon MD   [facility] ([facility])    [address] [facility]  [address] [facility] [address] [phone]            May 08, 2017  5:00 PM CDT   Infusion 60 with UC ONCOLOGY INFUSION, UC 10 ATC   [facility] ([facility])    [address] [facility] [address]  [address] [facility] [address] [phone]              Future tests that were ordered for you today     Open Future Orders        Priority Expected Expires Ordered    CBC with platelets differential Routine  3/12/2018 3/13/2017    Comprehensive metabolic panel Routine  3/12/2018 3/13/2017    MR Brain w/o & w Contrast Routine 5/13/2017 4/28/2026 3/13/2017            Who to contact     If you have questions or need follow up information about today's clinic visit or your schedule please contact [facility] CANCER [facility] directly at [phone].  Normal or non-critical lab and imaging results will be communicated to you by [employer], letter or phone within 4 business days after the clinic has received the results. If you do not hear from us within 7 days, please contact the clinic through [employer] or phone. If you have a critical or abnormal lab result, we will notify you by phone as soon as possible.  Submit refill requests through [facility] or call your pharmacy and they will forward the refill request to us. Please allow 3 business days for your refill to be completed.          Additional Information About Your Visit        [facility] Information     [facility] gives you secure access to your electronic health record. If you see a primary care provider, you can also send messages to your care team and make appointments. If you have questions, please call your primary care clinic.  If you do not have a primary care provider, please call [phone] and they will assist you.        Care EveryWhere ID     This is your Care EveryWhere  ID. This could be used by other organizations to access your [place] medical records  [id number]         Blood Pressure from Last 3 Encounters:   03/13/17 132/81   03/06/17 130/81   02/02/17 111/76    Weight from Last 3 Encounters:   03/13/17 80.7 kg (178 lb)   03/06/17 78.3 kg (172 lb 11.2 oz)   02/02/17 79.4 kg (175 lb)              We Performed the Following     *CBC with platelets differential     Comprehensive metabolic panel     Protein qualitative urine          Today's Medication Changes          These changes are accurate as of: 3/13/17  6:46 PM.  If you have any questions, ask your nurse or doctor.               These medicines have changed or have updated prescriptions.        Dose/Directions    * Trifluridine-Tipiracil 15-6.14 MG tablet   Commonly known as:  LONSURF   This may have changed:  Another medication with the same name was added. Make sure you understand how and when to take each.   Used for:  Metastasis to brain (H), Malignant neoplasm metastatic to lung, unspecified laterality (H), Rectal cancer (H)   Changed by:  Mary Ellen Piper RPH        Dose:  2 tablet   Take 2 tablets by mouth 2 times daily Take within 1 hour after morning and evening meals on Days 1 thru 5 and 8 thru 12 of each 28 day cycle   Quantity:  40 tablet   Refills:  0       * Trifluridine-Tipiracil 20-8.19 MG tablet   Commonly known as:  LONSURF   This may have changed:  Another medication with the same name was added. Make sure you understand how and when to take each.   Used for:  Metastasis to brain (H), Malignant neoplasm metastatic to lung, unspecified laterality (H), Rectal cancer (H)   Changed by:  Mary Ellen Piper RPH        Dose:  2 tablet   Take 2 tablets by mouth 2 times daily within 1 hour after morning and evening meals on Days 1 thru 5 and 8 thru 12 of each 28 day cycle.   Quantity:  40 tablet   Refills:  0       * Trifluridine-Tipiracil 15-6.14 MG tablet   Commonly known as:  LONSURF   This may have changed:   Another medication with the same name was added. Make sure you understand how and when to take each.   Used for:  Metastasis to brain (H), Malignant neoplasm metastatic to lung, unspecified laterality (H), Rectal cancer (H)   Changed by:  Ayah Nugent RN        Dose:  2 tablet   Take 2 tablets by mouth 2 times daily Take within 1 hour after morning and evening meals on Days 1 thru 5 and 8 thru 12 of each 28 day cycle   Quantity:  40 tablet   Refills:  0       * Trifluridine-Tipiracil 20-8.19 MG tablet   Commonly known as:  LONSURF   This may have changed:  Another medication with the same name was added. Make sure you understand how and when to take each.   Used for:  Metastasis to brain (H), Malignant neoplasm metastatic to lung, unspecified laterality (H), Rectal cancer (H)   Changed by:  Ayah Nugent RN        Dose:  2 tablet   Take 2 tablets by mouth 2 times daily within 1 hour after morning and evening meals on Days 1 thru 5 and 8 thru 12 of each 28 day cycle.   Quantity:  40 tablet   Refills:  0       * Trifluridine-Tipiracil 15-6.14 MG tablet   Commonly known as:  LONSURF   This may have changed:  You were already taking a medication with the same name, and this prescription was added. Make sure you understand how and when to take each.   Used for:  Malignant neoplasm metastatic to lung, unspecified laterality (H), Metastasis to brain (H), Rectal cancer (H)        Dose:  2 tablet   Take 2 tablets by mouth 2 times daily Take within 1 hour after morning and evening meals on Days 1 thru 5 and 8 thru 12 of each 28 day cycle   Quantity:  40 tablet   Refills:  0       * Trifluridine-Tipiracil 20-8.19 MG tablet   Commonly known as:  LONSURF   This may have changed:  You were already taking a medication with the same name, and this prescription was added. Make sure you understand how and when to take each.   Used for:  Malignant neoplasm metastatic to lung, unspecified laterality (H), Metastasis to brain  (H), Rectal cancer (H)        Dose:  2 tablet   Take 2 tablets by mouth 2 times daily within 1 hour after morning and evening meals on Days 1 thru 5 and 8 thru 12 of each 28 day cycle.   Quantity:  40 tablet   Refills:  0       * Notice:  This list has 6 medication(s) that are the same as other medications prescribed for you. Read the directions carefully, and ask your doctor or other care provider to review them with you.         Where to get your medicines      These medications were sent to [place], [place] - [address]  [address] [facility] [address], [place] [id number]    Hours:  TRANSPLANT PHONE NUMBER [phone] Phone:  [phone]     Trifluridine-Tipiracil 15-6.14 MG tablet    Trifluridine-Tipiracil 20-8.19 MG tablet                Primary Care Provider Office Phone # Fax #    Erasto Richardson [phone]        [facility] [address] [place] [address]  [facility] [address]        Thank you!     Thank you for choosing [facility]  for your care. Our goal is always to provide you with excellent care. Hearing back from our patients is one way we can continue to improve our services. Please take a few minutes to complete the written survey that you may receive in the mail after your visit with us. Thank you!             Your Updated Medication List - Protect others around you: Learn how to safely use, store and throw away your medicines at www.disposemymeds.org.          This list is accurate as of: 3/13/17  6:46 PM.  Always use your most recent med list.                   Brand Name Dispense Instructions for use    lacosamide 200 MG Tabs tablet    VIMPAT    30 tablet    Take 1.5 tabs (300mg) by mouth twice daily       levETIRAcetam 1000 MG Tabs     120 tablet    Take 2 tabs (2000mg) by mouth twice daily       LORazepam 0.5 MG tablet    ATIVAN    60 tablet    2 tabs q 8 hrs as needed for seizures       ondansetron 8 MG ODT tab     ZOFRAN-ODT    30 tablet    Take 1 tablet (8 mg) by mouth every 8 hours as needed for nausea       * Trifluridine-Tipiracil 15-6.14 MG tablet    LONSURF    40 tablet    Take 2 tablets by mouth 2 times daily Take within 1 hour after morning and evening meals on Days 1 thru 5 and 8 thru 12 of each 28 day cycle       * Trifluridine-Tipiracil 20-8.19 MG tablet    LONSURF    40 tablet    Take 2 tablets by mouth 2 times daily within 1 hour after morning and evening meals on Days 1 thru 5 and 8 thru 12 of each 28 day cycle.       * Trifluridine-Tipiracil 15-6.14 MG tablet    LONSURF    40 tablet    Take 2 tablets by mouth 2 times daily Take within 1 hour after morning and evening meals on Days 1 thru 5 and 8 thru 12 of each 28 day cycle       * Trifluridine-Tipiracil 20-8.19 MG tablet    LONSURF    40 tablet    Take 2 tablets by mouth 2 times daily within 1 hour after morning and evening meals on Days 1 thru 5 and 8 thru 12 of each 28 day cycle.       * Trifluridine-Tipiracil 15-6.14 MG tablet    LONSURF    40 tablet    Take 2 tablets by mouth 2 times daily Take within 1 hour after morning and evening meals on Days 1 thru 5 and 8 thru 12 of each 28 day cycle       * Trifluridine-Tipiracil 20-8.19 MG tablet    LONSURF    40 tablet    Take 2 tablets by mouth 2 times daily within 1 hour after morning and evening meals on Days 1 thru 5 and 8 thru 12 of each 28 day cycle.       * Notice:  This list has 6 medication(s) that are the same as other medications prescribed for you. Read the directions carefully, and ask your doctor or other care provider to review them with you.

## 2017-03-13 NOTE — LETTER
3/13/2017       RE: Deandre Alex  [address]  [facility] [address]     Dear Colleague,    Thank you for referring your patient, Deandre Alex, to the [facility]. Please see a copy of my visit note below.    It was a pleasure to see Deandre Alex today in Neurosurgery Clinic. he is a 46 year old male with metastatic colorectal cancer. He is currently on lonsurf and avastin for his brain metastasis and lung mets. He has been doing reasonably well since his last visit. No major changes or concerns. Seizures have been minimal.    Vitals:    03/13/17 1623   BP: 132/81   Pulse: 68   Resp: 16   Temp: 97.9  F (36.6  C)   TempSrc: Oral   SpO2: 100%   Weight: 80.7 kg (178 lb)     Body mass index is 24.84 kg/(m^2).  Data Unavailable    Awake, alert.  R hemiparesis, stable.    MRI shows slow progression of tumor as well as some tumor invading superior sagittal sinus. The imaging was shown to the patient and reviewed in clinic.     A: Metastatic colorectal cancer with brain metastasis.    P: Currently the patient is not interested in changing his therapy or undergoing surgery. We had a long discussion about when we might do surgery and the conclusion we came to was that we would consider it when the brain metastasis became his biggest problem.  Even then, we may not proceed with further surgery given his aversion to further functional deficits. Otherwise I will see him back in 2 months with repeat MRI.    Again, thank you for allowing me to participate in the care of your patient.      Sincerely,    Jewel Blanchard MD

## 2017-03-13 NOTE — MR AVS SNAPSHOT
After Visit Summary   3/13/2017    Deandre Alex    MRN: [id number]           Patient Information     Date Of Birth          1970        Visit Information        Provider Department      3/13/2017 4:15 PM Jewel Blanchard MD [facility]        Today's Diagnoses     Metastasis to brain (H)    -  1       Follow-ups after your visit        Follow-up notes from your care team     Return in about 2 months (around 5/13/2017).      Your next 10 appointments already scheduled     Apr 10, 2017  1:30 PM CDT   [facility] Lab Draw with  MASONIC LAB DRAW   [facility] Mason[facility] Lab Draw ([facility])    [address] [facility]  [address] [facility] [address]   [phone]            Apr 10, 2017  2:00 PM CDT   Infusion 60 with UC ONCOLOGY INFUSION, UC 15 ATC   [facility] ([facility])    [address] [place] [address]  [address]nd [facility] [address]   [phone]            Apr 28, 2017  8:00 AM CDT   (Arrive by 7:45 AM)   Return Seizure with Erasto Richardson MD   [facility] Neurology ([facility])    [address] [place] [address]  3rd Floor  [facility] [address]   [phone]            May 08, 2017 12:00 PM CDT   Masonic Lab Draw with  MASONIC LAB DRAW   [facility] Masonic Lab Draw ([facility])    [address] [place] [address]  [address] [facility] [address]   [phone]            May 08, 2017 12:40 PM CDT   (Arrive by 12:25 PM)   CT CHEST ABDOMEN PELVIS W/O & W CONTRAST with UCCT1   [facility] ([facility])    [address] [place] [address]  1st Floor  [facility] [address] [phone]           Please bring any scans or X-rays taken at other hospitals, if similar tests were done. Also bring a list of your medicines, including vitamins, minerals and over-the-counter drugs. It is safest to leave personal items at home.  Be sure to tell your doctor:    If you have any allergies.   If there s any chance you are pregnant.   If you are breastfeeding.   If you have any special needs.  You may have contrast for this exam. To prepare:   Do not eat or drink for 2 hours before your exam. If you need to take medicine, you may take it with small sips of water. (We may ask you to take liquid medicine as well.)   The day before your exam, drink extra fluids at least six 8-ounce glasses (unless your doctor tells you to restrict your fluids).  Patients over 70 or patients with diabetes or kidney problems:   If you haven t had a blood test (creatinine test) within the last 30 days, go to your clinic or Diagnostic Imaging Department for this test.  If you have diabetes:   If your kidney function is normal, continue taking your metformin (Avandamet, Glucophage, Glucovance, Metaglip) on the day of your exam.   If your kidney function is abnormal, wait 48 hours before restarting this medicine.  You will have oral contrast for this exam:   You will drink the contrast at home. Get this from your clinic or Diagnostic Imaging Department. Please follow the directions given.  Please wear loose clothing, such as a sweat suit or jogging clothes. Avoid snaps, zippers and other metal. We may ask you to undress and put on a hospital gown.  If you have any questions, please call the Imaging Department where you will have your exam.            May 08, 2017  4:00 PM CDT   (Arrive by 3:45 PM)   Return Visit with Willis Ramon MD   [facility])    [address] [place] [address]  [address] [facility] [address] [phone]            May 08, 2017  5:00 PM CDT   Infusion 60 with UC ONCOLOGY INFUSION, UC 10 ATC   [facility] ([facility])    [address] [place] [address]  [address] [facility] [address] [phone]              Who to contact     If you have questions or need follow up  information about today's clinic visit or your schedule please contact [facility] directly at [phone].  Normal or non-critical lab and imaging results will be communicated to you by [employer], letter or phone within 4 business days after the clinic has received the results. If you do not hear from us within 7 days, please contact the clinic through [employer] or phone. If you have a critical or abnormal lab result, we will notify you by phone as soon as possible.  Submit refill requests through [employer] or call your pharmacy and they will forward the refill request to us. Please allow 3 business days for your refill to be completed.          Additional Information About Your Visit        [employer] Information     [employer] gives you secure access to your electronic health record. If you see a primary care provider, you can also send messages to your care team and make appointments. If you have questions, please call your primary care clinic.  If you do not have a primary care provider, please call [phone] and they will assist you.        Care EveryWhere ID     This is your Care EveryWhere ID. This could be used by other organizations to access your [place] medical records  [id number]        Your Vitals Were     Pulse Temperature Respirations Pulse Oximetry BMI (Body Mass Index)       68 97.9  F (36.6  C) (Oral) 16 100% 24.84 kg/m2        Blood Pressure from Last 3 Encounters:   No data found for BP    Weight from Last 3 Encounters:   No data found for Wt              Today, you had the following     No orders found for display         Today's Medication Changes          These changes are accurate as of: 3/13/17 11:59 PM.  If you have any questions, ask your nurse or doctor.               These medicines have changed or have updated prescriptions.        Dose/Directions    * Trifluridine-Tipiracil 15-6.14 MG tablet   Commonly known as:  LONSURF   This may have changed:  Another medication with  the same name was added. Make sure you understand how and when to take each.   Used for:  Metastasis to brain (H), Malignant neoplasm metastatic to lung, unspecified laterality (H), Rectal cancer (H)   Changed by:  Mary Ellen Piper RPH        Dose:  2 tablet   Take 2 tablets by mouth 2 times daily Take within 1 hour after morning and evening meals on Days 1 thru 5 and 8 thru 12 of each 28 day cycle   Quantity:  40 tablet   Refills:  0       * Trifluridine-Tipiracil 20-8.19 MG tablet   Commonly known as:  LONSURF   This may have changed:  Another medication with the same name was added. Make sure you understand how and when to take each.   Used for:  Metastasis to brain (H), Malignant neoplasm metastatic to lung, unspecified laterality (H), Rectal cancer (H)   Changed by:  Mary Ellen Piper RPH        Dose:  2 tablet   Take 2 tablets by mouth 2 times daily within 1 hour after morning and evening meals on Days 1 thru 5 and 8 thru 12 of each 28 day cycle.   Quantity:  40 tablet   Refills:  0       * Trifluridine-Tipiracil 15-6.14 MG tablet   Commonly known as:  LONSURF   This may have changed:  Another medication with the same name was added. Make sure you understand how and when to take each.   Used for:  Metastasis to brain (H), Malignant neoplasm metastatic to lung, unspecified laterality (H), Rectal cancer (H)   Changed by:  Ayah Nugent RN        Dose:  2 tablet   Take 2 tablets by mouth 2 times daily Take within 1 hour after morning and evening meals on Days 1 thru 5 and 8 thru 12 of each 28 day cycle   Quantity:  40 tablet   Refills:  0       * Trifluridine-Tipiracil 20-8.19 MG tablet   Commonly known as:  LONSURF   This may have changed:  Another medication with the same name was added. Make sure you understand how and when to take each.   Used for:  Metastasis to brain (H), Malignant neoplasm metastatic to lung, unspecified laterality (H), Rectal cancer (H)   Changed by:  Ayah Nugent RN        Dose:  2  tablet   Take 2 tablets by mouth 2 times daily within 1 hour after morning and evening meals on Days 1 thru 5 and 8 thru 12 of each 28 day cycle.   Quantity:  40 tablet   Refills:  0       * Trifluridine-Tipiracil 15-6.14 MG tablet   Commonly known as:  LONSURF   This may have changed:  You were already taking a medication with the same name, and this prescription was added. Make sure you understand how and when to take each.   Used for:  Malignant neoplasm metastatic to lung, unspecified laterality (H), Metastasis to brain (H), Rectal cancer (H)        Dose:  2 tablet   Take 2 tablets by mouth 2 times daily Take within 1 hour after morning and evening meals on Days 1 thru 5 and 8 thru 12 of each 28 day cycle   Quantity:  40 tablet   Refills:  0       * Trifluridine-Tipiracil 20-8.19 MG tablet   Commonly known as:  LONSURF   This may have changed:  You were already taking a medication with the same name, and this prescription was added. Make sure you understand how and when to take each.   Used for:  Malignant neoplasm metastatic to lung, unspecified laterality (H), Metastasis to brain (H), Rectal cancer (H)        Dose:  2 tablet   Take 2 tablets by mouth 2 times daily within 1 hour after morning and evening meals on Days 1 thru 5 and 8 thru 12 of each 28 day cycle.   Quantity:  40 tablet   Refills:  0       * Notice:  This list has 6 medication(s) that are the same as other medications prescribed for you. Read the directions carefully, and ask your doctor or other care provider to review them with you.         Where to get your medicines      These medications were sent to [address] [address]    Hours:  TRANSPLANT PHONE NUMBER [phone] Phone:  [phone]     Trifluridine-Tipiracil 15-6.14 MG tablet    Trifluridine-Tipiracil 20-8.19 MG tablet                Primary Care Provider Office Phone # Fax  #    Erasto Richardson [phone]        [facility] [address] [facility] [address]  [facility] [address]        Thank you!     Thank you for choosing [facility]  for your care. Our goal is always to provide you with excellent care. Hearing back from our patients is one way we can continue to improve our services. Please take a few minutes to complete the written survey that you may receive in the mail after your visit with us. Thank you!             Your Updated Medication List - Protect others around you: Learn how to safely use, store and throw away your medicines at www.disposemymeds.org.          This list is accurate as of: 3/13/17 11:59 PM.  Always use your most recent med list.                   Brand Name Dispense Instructions for use    lacosamide 200 MG Tabs tablet    VIMPAT    30 tablet    Take 1.5 tabs (300mg) by mouth twice daily       levETIRAcetam 1000 MG Tabs     120 tablet    Take 2 tabs (2000mg) by mouth twice daily       LORazepam 0.5 MG tablet    ATIVAN    60 tablet    2 tabs q 8 hrs as needed for seizures       ondansetron 8 MG ODT tab    ZOFRAN-ODT    30 tablet    Take 1 tablet (8 mg) by mouth every 8 hours as needed for nausea       * Trifluridine-Tipiracil 15-6.14 MG tablet    LONSURF    40 tablet    Take 2 tablets by mouth 2 times daily Take within 1 hour after morning and evening meals on Days 1 thru 5 and 8 thru 12 of each 28 day cycle       * Trifluridine-Tipiracil 20-8.19 MG tablet    LONSURF    40 tablet    Take 2 tablets by mouth 2 times daily within 1 hour after morning and evening meals on Days 1 thru 5 and 8 thru 12 of each 28 day cycle.       * Trifluridine-Tipiracil 15-6.14 MG tablet    LONSURF    40 tablet    Take 2 tablets by mouth 2 times daily Take within 1 hour after morning and evening meals on Days 1 thru 5 and 8 thru 12 of each 28 day cycle       * Trifluridine-Tipiracil 20-8.19 MG tablet    LONSURF    40 tablet    Take 2 tablets by mouth 2  times daily within 1 hour after morning and evening meals on Days 1 thru 5 and 8 thru 12 of each 28 day cycle.       * Trifluridine-Tipiracil 15-6.14 MG tablet    LONSURF    40 tablet    Take 2 tablets by mouth 2 times daily Take within 1 hour after morning and evening meals on Days 1 thru 5 and 8 thru 12 of each 28 day cycle       * Trifluridine-Tipiracil 20-8.19 MG tablet    LONSURF    40 tablet    Take 2 tablets by mouth 2 times daily within 1 hour after morning and evening meals on Days 1 thru 5 and 8 thru 12 of each 28 day cycle.       * Notice:  This list has 6 medication(s) that are the same as other medications prescribed for you. Read the directions carefully, and ask your doctor or other care provider to review them with you.

## 2017-03-18 NOTE — ED AVS SNAPSHOT
[facility], Emergency Department    [address] [facility] [address]    Phone:  [phone]                                       Deandre Alex   MRN: [id number]    Department:  [facility], Emergency Department   Date of Visit:  3/18/2017           Patient Information     Date Of Birth          1970        Your diagnoses for this visit were:     Vertigo        You were seen by Rodriguez Boyd MD.        Discharge Instructions       Please make an appointment to follow up with Neurology Clinic (phone: [phone]) as directed.    Please make an appointment to follow up with Ear Nose and Throat Clinic (phone: [phone]) in 4-7 days unless symptoms completely resolve.    Discharge References/Attachments     VERTIGO, UNSPECIFIED (ENGLISH)      Future Appointments        Provider Department Dept Phone Center    [facility]2017 [facility]30 [facility] [phone] [facility]    4/10/2017 2:00 PM [facility]; Oncology Infusion [facility] [phone] [facility]    4/28/2017 8:00 AM Erasto Richardson MD [facility] Neurology [phone] [facility]    5/8/2017 12:00 PM [facility] Draw [facility] Lab Draw [phone] [facility]    5/8/2017 12:40 PM [facility] CT ROOM 1 [facility] [phone] [facility]    5/8/2017 4:00 PM Willis Ramon MD [facility] [phone] [facility]    5/8/2017 5:00 PM [facility]; [facility]fusion [facility] [phone] [facility]      24 Hour Appointment Hotline       To make an appointment at any [facility], call [phone] ([phone]). If you don't have a family doctor or clinic, we will help you find one. [facility] clinics are conveniently located to serve the needs of you and your family.             Review of your medicines      START taking        Dose / Directions Last dose taken    meclizine 25 MG tablet   Commonly known  as:  ANTIVERT   Dose:  25-50 mg   Quantity:  30 tablet        Take 1-2 tablets (25-50 mg) by mouth 3 times daily as needed for dizziness   Refills:  0          Our records show that you are taking the medicines listed below. If these are incorrect, please call your family doctor or clinic.        Dose / Directions Last dose taken    lacosamide 200 MG Tabs tablet   Commonly known as:  VIMPAT   Quantity:  30 tablet        Take 1.5 tabs (300mg) by mouth twice daily   Refills:  3        levETIRAcetam 1000 MG Tabs   Quantity:  120 tablet        Take 2 tabs (2000mg) by mouth twice daily   Refills:  6        LORazepam 0.5 MG tablet   Commonly known as:  ATIVAN   Quantity:  60 tablet        2 tabs q 8 hrs as needed for seizures   Refills:  3        ondansetron 8 MG ODT tab   Commonly known as:  ZOFRAN-ODT   Dose:  8 mg   Quantity:  30 tablet        Take 1 tablet (8 mg) by mouth every 8 hours as needed for nausea   Refills:  3        * Trifluridine-Tipiracil 15-6.14 MG tablet   Commonly known as:  LONSURF   Dose:  2 tablet   Quantity:  40 tablet        Take 2 tablets by mouth 2 times daily Take within 1 hour after morning and evening meals on Days 1 thru 5 and 8 thru 12 of each 28 day cycle   Refills:  0        * Trifluridine-Tipiracil 20-8.19 MG tablet   Commonly known as:  LONSURF   Dose:  2 tablet   Quantity:  40 tablet        Take 2 tablets by mouth 2 times daily within 1 hour after morning and evening meals on Days 1 thru 5 and 8 thru 12 of each 28 day cycle.   Refills:  0        * Trifluridine-Tipiracil 15-6.14 MG tablet   Commonly known as:  LONSURF   Dose:  2 tablet   Quantity:  40 tablet        Take 2 tablets by mouth 2 times daily Take within 1 hour after morning and evening meals on Days 1 thru 5 and 8 thru 12 of each 28 day cycle   Refills:  0        * Trifluridine-Tipiracil 20-8.19 MG tablet   Commonly known as:  LONSURF   Dose:  2 tablet   Quantity:  40 tablet        Take 2 tablets by mouth 2 times daily within  1 hour after morning and evening meals on Days 1 thru 5 and 8 thru 12 of each 28 day cycle.   Refills:  0        * Trifluridine-Tipiracil 15-6.14 MG tablet   Commonly known as:  LONSURF   Dose:  2 tablet   Quantity:  40 tablet        Take 2 tablets by mouth 2 times daily Take within 1 hour after morning and evening meals on Days 1 thru 5 and 8 thru 12 of each 28 day cycle   Refills:  0        * Trifluridine-Tipiracil 20-8.19 MG tablet   Commonly known as:  LONSURF   Dose:  2 tablet   Quantity:  40 tablet        Take 2 tablets by mouth 2 times daily within 1 hour after morning and evening meals on Days 1 thru 5 and 8 thru 12 of each 28 day cycle.   Refills:  0        * Notice:  This list has 6 medication(s) that are the same as other medications prescribed for you. Read the directions carefully, and ask your doctor or other care provider to review them with you.            Prescriptions were sent or printed at these locations (1 Prescription)                   Other Prescriptions                Printed at Department/Unit printer (1 of 1)         meclizine (ANTIVERT) 25 MG tablet                Procedures and tests performed during your visit     CBC with platelets differential    Cardiac Continuous Monitoring    Comprehensive metabolic panel    Draw and hold    EKG 12-lead, tracing only    Lacosamide Vimpat    Levetiracetam level    Peripheral IV: Standard      Orders Needing Specimen Collection     None      Pending Results     Date and Time Order Name Status Description    3/18/2017 1914 Levetiracetam level In process     3/18/2017 1914 Lacosamide Vimpat In process     3/18/2017 1850 EKG 12-lead, tracing only Preliminary             Pending Culture Results     No orders found from 3/16/2017 to 3/19/2017.            Thank you for choosing [facility]       Thank you for choosing [facility] for your care. Our goal is always to provide you with excellent care. Hearing back from our patients is one way we can continue to  improve our services. Please take a few minutes to complete the written survey that you may receive in the mail after you visit with us. Thank you!        [employer] Information     [employer] gives you secure access to your electronic health record. If you see a primary care provider, you can also send messages to your care team and make appointments. If you have questions, please call your primary care clinic.  If you do not have a primary care provider, please call [phone] and they will assist you.        Care EveryWhere ID     This is your Care EveryWhere ID. This could be used by other organizations to access your [place] medical records  [id number]        After Visit Summary       This is your record. Keep this with you and show to your community pharmacist(s) and doctor(s) at your next visit.

## 2017-03-18 NOTE — ED NOTES
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 dizzyness began this am at 6am

## 2017-03-18 NOTE — ED AVS SNAPSHOT
[facility], [place], Emergency Department    [address] [place] [address]    Phone:  [phone]                                       Deandre Alex   MRN: [id number]    Department:  [facility], Emergency Department   Date of Visit:  3/18/2017           After Visit Summary Signature Page     I have received my discharge instructions, and my questions have been answered. I have discussed any challenges I see with this plan with the nurse or doctor.    ..........................................................................................................................................  Patient/Patient Representative Signature      ..........................................................................................................................................  Patient Representative Print Name and Relationship to Patient    ..................................................               ................................................  Date                                            Time    ..........................................................................................................................................  Reviewed by Signature/Title    ...................................................              ..............................................  Date                                                            Time

## 2017-03-19 NOTE — DISCHARGE INSTRUCTIONS
Please make an appointment to follow up with Neurology Clinic (phone: [phone]) as directed.    Please make an appointment to follow up with Ear Nose and Throat Clinic (phone: [phone]) in 4-7 days unless symptoms completely resolve.

## 2017-03-19 NOTE — ED PROVIDER NOTES
[facility] EMERGENCY DEPARTMENT ([facility])  March 18, 2017  ED 12   History     Chief Complaint   Patient presents with     Dizziness     The history is provided by the patient and medical records.     Deandre Alex is a 46-year-old male with a history of rectal cancer that has metastasized to his lungs, liver, and his brain.  Patient states that he is currently undergoing chemotherapy and noticed that in the last 24 hours he has developed vertiginous-type symptoms with movement.  Patient states he has had 3 episodes of vertigo that are short-lived and resolved. No headache, new focal numbness or weakness, palpitations, chest pain, shortness of breath, or visual complaints.  Patient states that he has a new lesion on a recent MRI from 2 weeks ago that may be attributable to the current symptoms.  Patient denies any vomiting, diarrhea, melena, or bright blood per rectum.    I have reviewed the Medications, Allergies, Past Medical and Surgical History, and Social History in the [employer] system.  PAST MEDICAL HISTORY:   Past Medical History   Diagnosis Date     Hand foot syndrome      Lipidoses      Patient on combined chemotherapy and radiation      Psoriasis      Rectal cancer (H)      Secondary malignant neoplasm of liver (H)      Seizures (H)        PAST SURGICAL HISTORY:   Past Surgical History   Procedure Laterality Date     Appendectomy  2008     Colectomy low anterior  4/24/2013     Procedure: COLECTOMY LOW ANTERIOR;  Exploratory Laparotomy, Intraoperative Ultrasound,        Ileostomy  4/24/2013     Procedure: ILEOSTOMY;;  Surgeon: Rolando Garrison MD;  [facility]     Resection abdominal perineal  4/24/2013     Procedure: RESECTION ABDOMINAL PERINEAL;;  Surgeon: Rolando Garrison MD;  [facility]     Hepatectomy partial  4/24/2013     Procedure: HEPATECTOMY PARTIAL;;  Surgeon: Tristian Álvarez MD;  [facility]     Exam under anesthesia anus  1/9/2014     Procedure: EXAM UNDER ANESTHESIA ANUS;   Rectal Examination Under Anesthesia, Flexible Coloscopy, Rectal Balloon Dilation;  Surgeon: Rolando Garrison MD;  [facility]     Dilate rectum  1/9/2014     Procedure: DILATE RECTUM;;  Surgeon: Rolando Garrison MD;  [facility]     Colonoscopy  1/9/2014     Procedure: COLONOSCOPY;;  Surgeon: Rolando Garrison MD;  [facility]     Thoracoscopic resection lung  1/30/2014     Procedure: THORACOSCOPIC RESECTION LUNG;  Left Thoracoscopic Lingulectomy, Flexible Bronchoscopy  ;  Surgeon: Aden Hays MD;  [facility]     Bronchoscopy flexible  1/30/2014     Procedure: BRONCHOSCOPY FLEXIBLE;;  Surgeon: Aden Hays MD;  [facility]     Thoracoscopic wedge resection lung  6/26/2014     Procedure: THORACOSCOPIC WEDGE RESECTION LUNG;  Surgeon: Aden Hays MD;  [facility]     Dilate rectum  6/26/2014     Procedure: DILATE RECTUM;  Surgeon: Rolando Garrison MD;  [facility]     Biopsy rectum  6/26/2014     Procedure: BIOPSY RECTUM;  Surgeon: Rolando Garrison MD;  [facility]     Appendectomy       Optical tracking system craniotomy, excise tumor with mapping, combined Left 11/5/2014     Procedure: COMBINED OPTICAL TRACKING SYSTEM CRANIOTOMY, EXCISE TUMOR WITH MAPPING;  Surgeon: Jewel Blanchard MD;  [facility]     Optical tracking system craniotomy, excise tumor, combined Left 3/3/2015     Procedure: COMBINED OPTICAL TRACKING SYSTEM CRANIOTOMY, EXCISE TUMOR;  Surgeon: Jewel Blanchard MD;  [facility]       FAMILY HISTORY:   Family History   Problem Relation Age of Onset     CEREBROVASCULAR DISEASE Father 69     C.A.D. Mother 67     Valve Surgery     Family History Negative Brother      Family History Negative Brother      Family History Negative Daughter      Family History Negative Son      Cancer - colorectal No family hx of      Crohn Disease No family hx of      Ulcerative Colitis No family hx of      Colon Polyps No family hx of       Anesthesia Reaction No family hx of        SOCIAL HISTORY:   Social History   Substance Use Topics     Smoking status: Former Smoker     Packs/day: 1.00     Years: 10.00     Quit date: 12/14/2005     Smokeless tobacco: Never Used     Alcohol use 3.0 oz/week     5 Glasses of wine per week      Comment: Rare       Previous Medications    LACOSAMIDE (VIMPAT) 200 MG TABS TABLET    Take 1.5 tabs (300mg) by mouth twice daily    LEVETIRACETAM 1000 MG TABS    Take 2 tabs (2000mg) by mouth twice daily    LORAZEPAM (ATIVAN) 0.5 MG TABLET    2 tabs q 8 hrs as needed for seizures    ONDANSETRON (ZOFRAN-ODT) 8 MG ODT TAB    Take 1 tablet (8 mg) by mouth every 8 hours as needed for nausea    TRIFLURIDINE-TIPIRACIL (LONSURF) 15-6.14 MG TABLET    Take 2 tablets by mouth 2 times daily Take within 1 hour after morning and evening meals on Days 1 thru 5 and 8 thru 12 of each 28 day cycle    TRIFLURIDINE-TIPIRACIL (LONSURF) 15-6.14 MG TABLET    Take 2 tablets by mouth 2 times daily Take within 1 hour after morning and evening meals on Days 1 thru 5 and 8 thru 12 of each 28 day cycle    TRIFLURIDINE-TIPIRACIL (LONSURF) 15-6.14 MG TABLET    Take 2 tablets by mouth 2 times daily Take within 1 hour after morning and evening meals on Days 1 thru 5 and 8 thru 12 of each 28 day cycle    TRIFLURIDINE-TIPIRACIL (LONSURF) 20-8.19 MG TABLET    Take 2 tablets by mouth 2 times daily within 1 hour after morning and evening meals on Days 1 thru 5 and 8 thru 12 of each 28 day cycle.    TRIFLURIDINE-TIPIRACIL (LONSURF) 20-8.19 MG TABLET    Take 2 tablets by mouth 2 times daily within 1 hour after morning and evening meals on Days 1 thru 5 and 8 thru 12 of each 28 day cycle.    TRIFLURIDINE-TIPIRACIL (LONSURF) 20-8.19 MG TABLET    Take 2 tablets by mouth 2 times daily within 1 hour after morning and evening meals on Days 1 thru 5 and 8 thru 12 of each 28 day cycle.          Allergies   Allergen Reactions     Nka [No Known Allergies]       Review of  "Systems   Constitutional: Negative for fever.   HENT: Negative for congestion.    Eyes: Negative for redness.   Respiratory: Negative for shortness of breath.    Cardiovascular: Negative for chest pain.   Gastrointestinal: Negative for abdominal pain.   Genitourinary: Negative for difficulty urinating.   Musculoskeletal: Negative for arthralgias and neck stiffness.   Skin: Negative for color change.   Neurological: Positive for dizziness. Negative for headaches.   Psychiatric/Behavioral: Negative for confusion.    ROS: 10 point ROS neg other than the symptoms noted above in the HPI.    Physical Exam   BP: 138/83  Pulse: 79  Heart Rate: 68  Temp: 97.8  F (36.6  C)  Resp: 18  Height: 180.3 cm (5' 11\")  SpO2: 100 %  Physical Exam   Constitutional: He is oriented to person, place, and time. He appears well-nourished. No distress.   Alert and conversant pleasant   HENT:   Head: Atraumatic.   Eyes: EOM are normal. Pupils are equal, round, and reactive to light.   No current evidence of nystagmus   Neck: Neck supple.   Cardiovascular: Normal heart sounds.    Pulmonary/Chest: Breath sounds normal.   Abdominal: Soft.   Musculoskeletal: He exhibits no edema or tenderness.   Neurological: He is alert and oriented to person, place, and time. No cranial nerve deficit.   Patient has chronic right leg weakness from previous surgery   Skin: Skin is warm.   Psychiatric: He has a normal mood and affect.       ED Course     ED Course     Procedures        EKG revealed a normal sinus rhythm at a rate of 71 with a VT interval 0.176 and a QRS duration of 0.102.  The patient had a normal axis with no acute ST or T-wave changes noted for ischemia.  This is read by me personally.    Results for orders placed or performed during the hospital encounter of 03/18/17 (from the past 24 hour(s))   EKG 12-lead, tracing only   Result Value Ref Range    Interpretation ECG Click View Image link to view waveform and result    CBC with platelets " differential   Result Value Ref Range    WBC 3.7 (L) 4.0 - 11.0 10e9/L    RBC Count 3.94 (L) 4.4 - 5.9 10e12/L    Hemoglobin 13.9 13.3 - 17.7 g/dL    Hematocrit 38.7 (L) 40.0 - 53.0 %    MCV 98 78 - 100 fl    MCH 35.3 (H) 26.5 - 33.0 pg    MCHC 35.9 31.5 - 36.5 g/dL    RDW 15.8 (H) 10.0 - 15.0 %    Platelet Count 123 (L) 150 - 450 10e9/L    Diff Method Automated Method     % Neutrophils 74.3 %    % Lymphocytes 14.6 %    % Monocytes 10.0 %    % Eosinophils 0.3 %    % Basophils 0.5 %    % Immature Granulocytes 0.3 %    Nucleated RBCs 0 0 /100    Absolute Neutrophil 2.8 1.6 - 8.3 10e9/L    Absolute Lymphocytes 0.5 (L) 0.8 - 5.3 10e9/L    Absolute Monocytes 0.4 0.0 - 1.3 10e9/L    Absolute Eosinophils 0.0 0.0 - 0.7 10e9/L    Absolute Basophils 0.0 0.0 - 0.2 10e9/L    Abs Immature Granulocytes 0.0 0 - 0.4 10e9/L    Absolute Nucleated RBC 0.0    Comprehensive metabolic panel   Result Value Ref Range    Sodium 142 133 - 144 mmol/L    Potassium 3.9 3.4 - 5.3 mmol/L    Chloride 108 94 - 109 mmol/L    Carbon Dioxide 27 20 - 32 mmol/L    Anion Gap 7 3 - 14 mmol/L    Glucose 99 70 - 99 mg/dL    Urea Nitrogen 13 7 - 30 mg/dL    Creatinine 0.96 0.66 - 1.25 mg/dL    GFR Estimate 84 >60 mL/min/1.7m2    GFR Estimate If Black >90   GFR Calc   >60 mL/min/1.7m2    Calcium 8.4 (L) 8.5 - 10.1 mg/dL    Bilirubin Total 0.8 0.2 - 1.3 mg/dL    Albumin 3.8 3.4 - 5.0 g/dL    Protein Total 6.9 6.8 - 8.8 g/dL    Alkaline Phosphatase 108 40 - 150 U/L    ALT 23 0 - 70 U/L    AST 19 0 - 45 U/L     Medications - No data to display      Assessments & Plan (with Medical Decision Making)     I have reviewed the nursing notes.    Differential diagnosis included usual causes of peripheral vertigo but also with the patient's MRI findings and history of partial seizures, neurology was asked to see the patient didn't comment on whether or not his episodic vertiginous symptoms may actually be some sort of seizure activity.  Neurology felt  this was very unlikely.  They evaluated the patient and recommended routine vertigo treatment.    I have reviewed the findings, diagnosis, plan and need for follow up with the patient.    New Prescriptions    MECLIZINE (ANTIVERT) 25 MG TABLET    Take 1-2 tablets (25-50 mg) by mouth 3 times daily as needed for dizziness       Final diagnoses:   Vertigo     Please make an appointment to follow up with Neurology Clinic (phone: [phone]) as directed.    Please make an appointment to follow up with [facility] (phone: [phone]) in 4-7 days unless symptoms completely resolve.    Routine discharge instructions were given for this diagnosis.    Rodriguez Boyd MD    3/18/2017   [facility], [place], EMERGENCY DEPARTMENT     Rodriguez Boyd MD  03/18/17 [id number]

## 2017-03-19 NOTE — CONSULTS
Neurology Consult    CC/Reason for consult: vertigo    HPI:   47 yo man with hx rectal cancer with brain mets s/p 2 resections currently undergoing chemotherapy & focal seizures (2/2 L frontal met) who presents with 3 episodes of vertigo on 3/18/17. Occurred when patient was lying down & turned head to right or left. Each episode lasted <1 minute. Symptoms improved with turning head back to midline/neutral position. Has never had this sensation before. Denies gait imbalance, double vision, slurred speech. Does have chronic nausea related to chemo. 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 Follows with Dr. Richardson for seizures. On keppra & vimpat; no recent seizures. No recent changes in meds.    ROS: Negative except as stated above.    PMHx/PSHx:  Past Medical History   Diagnosis Date     Hand foot syndrome      Lipidoses      Patient on combined chemotherapy and radiation      Psoriasis      Rectal cancer (H)      Secondary malignant neoplasm of liver (H)      Seizures (H)      Past Surgical History   Procedure Laterality Date     Appendectomy  2008     Colectomy low anterior  4/24/2013     Procedure: COLECTOMY LOW ANTERIOR;  Exploratory Laparotomy, Intraoperative Ultrasound,        Ileostomy  4/24/2013     Procedure: ILEOSTOMY;;  Surgeon: Rolando Garrison MD;  [facility]     Resection abdominal perineal  4/24/2013     Procedure: RESECTION ABDOMINAL PERINEAL;;  Surgeon: Rolando Garrison MD;  [facility]     Hepatectomy partial  4/24/2013     Procedure: HEPATECTOMY PARTIAL;;  Surgeon: Tristian Álvarez MD;  [facility]     Exam under anesthesia anus  1/9/2014     Procedure: EXAM UNDER ANESTHESIA ANUS;  Rectal Examination Under Anesthesia, Flexible Coloscopy, Rectal Balloon Dilation;  Surgeon: Rolando aGrrison MD;  [facility]     Dilate rectum  1/9/2014     Procedure: DILATE RECTUM;;  Surgeon: Rolando Garrison MD;  [facility]     Colonoscopy  1/9/2014     Procedure:  COLONOSCOPY;;  Surgeon: Rolando Garrison MD;  [facility]     Thoracoscopic resection lung  1/30/2014     Procedure: THORACOSCOPIC RESECTION LUNG;  Left Thoracoscopic Lingulectomy, Flexible Bronchoscopy  ;  Surgeon: Aden Hays MD;  [facility]     Bronchoscopy flexible  1/30/2014     Procedure: BRONCHOSCOPY FLEXIBLE;;  Surgeon: Aden Hays MD;  [facility]     Thoracoscopic wedge resection lung  6/26/2014     Procedure: THORACOSCOPIC WEDGE RESECTION LUNG;  Surgeon: Aden Hays MD;  [facility]     Dilate rectum  6/26/2014     Procedure: DILATE RECTUM;  Surgeon: Rolando Garrison MD;  [facility]     Biopsy rectum  6/26/2014     Procedure: BIOPSY RECTUM;  Surgeon: Rolando Garrison MD;  [facility]     Appendectomy       Optical tracking system craniotomy, excise tumor with mapping, combined Left 11/5/2014     Procedure: COMBINED OPTICAL TRACKING SYSTEM CRANIOTOMY, EXCISE TUMOR WITH MAPPING;  Surgeon: Jewel Blanchard MD;  [facility]     Optical tracking system craniotomy, excise tumor, combined Left 3/3/2015     Procedure: COMBINED OPTICAL TRACKING SYSTEM CRANIOTOMY, EXCISE TUMOR;  Surgeon: Jewel Blanchard MD;  [facility]       Medications:    Current Facility-Administered Medications on File Prior to Encounter:  labetalol (NORMODYNE,TRANDATE) injection     Current Outpatient Prescriptions on File Prior to Encounter:  Trifluridine-Tipiracil (LONSURF) 15-6.14 MG tablet Take 2 tablets by mouth 2 times daily Take within 1 hour after morning and evening meals on Days 1 thru 5 and 8 thru 12 of each 28 day cycle   Trifluridine-Tipiracil (LONSURF) 20-8.19 MG tablet Take 2 tablets by mouth 2 times daily within 1 hour after morning and evening meals on Days 1 thru 5 and 8 thru 12 of each 28 day cycle.   Trifluridine-Tipiracil (LONSURF) 15-6.14 MG tablet Take 2 tablets by mouth 2 times daily Take within 1 hour after morning and evening meals on  "Days 1 thru 5 and 8 thru 12 of each 28 day cycle   Trifluridine-Tipiracil (LONSURF) 20-8.19 MG tablet Take 2 tablets by mouth 2 times daily within 1 hour after morning and evening meals on Days 1 thru 5 and 8 thru 12 of each 28 day cycle.   ondansetron (ZOFRAN-ODT) 8 MG ODT tab Take 1 tablet (8 mg) by mouth every 8 hours as needed for nausea   LORazepam (ATIVAN) 0.5 MG tablet 2 tabs q 8 hrs as needed for seizures   levETIRAcetam 1000 MG TABS Take 2 tabs (2000mg) by mouth twice daily   lacosamide (VIMPAT) 200 MG TABS tablet Take 1.5 tabs (300mg) by mouth twice daily   Trifluridine-Tipiracil (LONSURF) 15-6.14 MG tablet Take 2 tablets by mouth 2 times daily Take within 1 hour after morning and evening meals on Days 1 thru 5 and 8 thru 12 of each 28 day cycle   Trifluridine-Tipiracil (LONSURF) 20-8.19 MG tablet Take 2 tablets by mouth 2 times daily within 1 hour after morning and evening meals on Days 1 thru 5 and 8 thru 12 of each 28 day cycle.         Allergies:  Nka [no known allergies]    Social Hx: non-contributory    Family Hx: non-contributory    Exam:  /88  Pulse 79  Temp 97.8  F (36.6  C) (Oral)  Resp 12  Ht 1.803 m (5' 11\")  SpO2 100%    Gen: NAD  Neck: no meningismus  Neuro: awake, fluent speech. PERRL, EOMI. Sustained nystagmus with horizontal end-gaze in both directions (no rotatory or vertical nystagmus noted). Symmetric facial movements, no dysarthria. Normal tone & full strength in left-side. Increased tone on R side & has drift when holding R arm & leg against gravity. Reflexes slightly brisker on R side compared to L, no ankle clonus noted. No dysmetria with FNF bilaterally.    HINTS exam:  -head impulse with normal saccades/corrections.  -does have horizontal nystagmus with end-gaze in both directions, which is direction-changing. No rotatory or vertical nystagmus noted. No nystagmus with primary gaze.  -no skew deviation seen.    Amigo Hallpike:  + in both directions (with horizontal nystagmus). " "Feeling of vertigo is reproduced subjectively, which improves when head is returned to midline/neutral position.    Pertinent Data:  MRI brain 3/6/17  \"Impression:  1. Stable to slight increase in size of the left frontoparietal  metastatic lesion, measuring up to 3.8 cm in greatest dimension, when  compared to prior exam from 1/2/2017. No interval progression of  invasion into the superior sagittal sinus. No evidence of new  metastatic lesions.  2. 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 With multiple episodes of vertigo, worse with head turn in either direction & +Garland-Hallpike maneuver. Low suspicion for stroke given multiple episodes, negative HINTS exam & no major risk factors. Low suspicion for paraneoplastic syndrome given episodic time course & no prodrome of encephalopathy. Low suspicion for new met b/c recently had imaging 3/6/17 & no cerebellar/brainstem lesion seen at that time.    - Discussed epley maneuver with patient. He will try on his own & can follow-up with OT if needed.  - If symptoms worsen or new symptoms develop, then consider re-imaging.    Discussed with Neurology Staff Dr. Rojas over the phone.     Domenica Mcdonough  G2 Neurology    ATTENDING ADDENDUM: Patient discussed on the phone with resident Dr Mcdonough on 3/18/2017 but not seen. Agree w above assessment and plan. Symptoms and reported neuro exam findings appear c/w BPPV therefore head imaging is not required. Madhu Rojas MD  "

## 2017-03-23 NOTE — ORAL ONC MGMT
"Oral Chemotherapy Monitoring Program    Primary Oncologist: Dr. Ramon  Primary Oncology Clinic: [facility]   Cancer Diagnosis: Rectal Cancer    Therapy History:  Lonsurf 70mg bid on days 1 - 5 and 6 - 12 of each 28 day cycle    Lab Monitoring Plan  C1D1+  CBC, CMP  C2D1+  Call, CBC, CMP  C3D1+  Call, CBC, CMP  C4D1+  Call, CBC, CMP  C5D1+  Call, CBC, CMP  C6D1+  Call, CBC, CMP    C1D8+  C2D8+  C3D8+  C4D8+  C5D8+  C6D8+    C1D15+  CBC  C2D15+  CBC  C3D15+  CBC  C4D15+  CBC  C5D15+  CBC  C6D15+  CBC    C1D22+  C2D22+  C3D22+  C4D22+  C5D22+  C6D22        Subjective/Objective:  Deandre Alex is a 46 year old male contacted by phone for a follow-up visit for oral chemotherapy.  Deandre reports that he is doing well.  He reports grade 1 nausea which is controled with the use of ondansetron.  He requested that a refill for his ondansetron be sent to [facility] in [place]. In addition, he reports fatigue some of the time depending on where he is in his cycle.  Finally, Deandre reports drinking 24 ounces of liquid daily.        ORAL CHEMOTHERAPY 1/4/2017 3/23/2017   Drug Name Lonsurf (Trifluridine/Tipiracil) Lonsurf (Trifluridine/Tipiracil)   Current Dosage Other Other   Current Schedule Other BID   Cycle Details Other Days 1-5 and Days 8-12   Doses missed in last 2 weeks - 0   Adherence Assessment - Adherent   Adverse Effects - Fatigue   Nausea - Grade 1   Fatigue - Grade 1   Pharmacist Intervention(fatigue) - No   Home BPs - not needed   Any new drug interactions? - No       Vitals:  BP:   BP Readings from Last 1 Encounters:   03/18/17 126/88     Wt Readings from Last 1 Encounters:   03/13/17 80.7 kg (178 lb)     Estimated body surface area is 2.01 meters squared as calculated from the following:    Height as of 2/2/17: 1.803 m (5' 10.98\").    Weight as of 3/13/17: 80.7 kg (178 lb).    Labs:  Lab Results   Component Value Date     03/18/2017      Lab Results   Component Value Date    POTASSIUM 3.9 03/18/2017     Lab " Results   Component Value Date    AFYE 8.4 03/18/2017     Lab Results   Component Value Date    ALBUMIN 3.8 03/18/2017     Lab Results   Component Value Date    BUN 13 03/18/2017     Lab Results   Component Value Date    CR 0.96 03/18/2017       Lab Results   Component Value Date    AST 19 03/18/2017     Lab Results   Component Value Date    ALT 23 03/18/2017     Lab Results   Component Value Date    BILITOTAL 0.8 03/18/2017       Lab Results   Component Value Date    WBC 3.7 03/18/2017     Lab Results   Component Value Date    HGB 13.9 03/18/2017     Lab Results   Component Value Date     03/18/2017     Lab Results   Component Value Date    ANEU 2.8 03/18/2017     Deandre is due for labs    Assessment:  Deandre is tolerating his Lonsurf regimen well at this time. No pharmacological interventions are needed.  Deandre needs a refill of his anti-emetic medication.  Deandre is not drinking enough fluids.    Plan:  Continue Lonsurf therapy.  A refill for ondansetron has been sent to [facility] in [place].  Deandre will try and increase water consumption to 80 ounces of water daily.  Staying hydrated should help minimize fatigue and constipation caused by chemotherapy, and it also protects the kidneys from the toxic waste buildup.    Follow-Up:  Next week for lab results    Tye HUNTER.Ph.  [facility]  Oral Chemotherapy Program  [phone]

## 2017-04-01 NOTE — TELEPHONE ENCOUNTER
Patient ran out of vimpat. I called his [facility] pharmacy & prescribed 30-day supply of vimpat 300mg BID, which should be sufficient until next visit with Dr. Richardson in late-April.     Domenica Mcdonough  G2 Neurology

## 2017-04-10 NOTE — PATIENT INSTRUCTIONS
Contact Numbers    [facility] Main Line: [phone]  [facility] Triage:  [phone]    Call triage with chills and/or temperature greater than or equal to 100.5, uncontrolled nausea/vomiting, diarrhea, constipation, dizziness, shortness of breath, chest pain, bleeding, unexplained bruising, or any new/concerning symptoms, questions/concerns.     If you are having any concerning symptoms or wish to speak to a provider before your next infusion visit, please call your care coordinator or triage to notify them so we can adequately serve you.       After Hours: [phone]    If after hours, weekends, or holidays, call main hospital  and ask for Oncology doctor on call.         April 2017 Sunday Monday Tuesday Wednesday Thursday Friday Saturday                                 1       2     3     4     5     6     7     8       9     10     [facility] DRAW    1:30 PM   (15 min.)    [facility] DRAW   [facility] Lab Draw     [facility] ONC INFUSION 60    2:00 PM   (60 min.)    ONCOLOGY INFUSION   [facility] 11     12     13     14     15       16     17     18     19     20     21     22       23     24     25     26     27     28     [facility] RETURN SEIZURE    7:45 AM   (30 min.)   Erasto Richardson MD   [facility] Neurology 29       30                                              May 2017   Deep Monday Tuesday Wednesday Thursday Friday Saturday        1     2     3     4     5     6       7     8     [facility] DRAW   12:00 PM   (15 min.)    [facility] DRAW   [facility] Lab Draw     CT CHEST ABDOMEN PELVIS WWO   12:25 PM   (20 min.)   UCCT1   [facility] CT     [facility] RETURN    3:00 PM   (15 min.)   Jewel Blanchard MD   [facility]     [facility] RETURN    3:45 PM   (30 min.)   Willis Ramon MD   [facility]     [facility] ONC INFUSION 60    5:00 PM   (60 min.)    ONCOLOGY INFUSION   [facility] 9     10     11      12     13       14     15     16     17     18     19     20       21     22     23     24     25     26     27       28     29     30     31                                 Lab Results:  Recent Results (from the past 12 hour(s))   Protein qualitative urine    Collection Time: 04/10/17  2:10 PM   Result Value Ref Range    Protein Albumin Urine 30 (A) NEG mg/dL   Comprehensive metabolic panel    Collection Time: 04/10/17  2:34 PM   Result Value Ref Range    Sodium 139 133 - 144 mmol/L    Potassium 3.8 3.4 - 5.3 mmol/L    Chloride 107 94 - 109 mmol/L    Carbon Dioxide 25 20 - 32 mmol/L    Anion Gap 7 3 - 14 mmol/L    Glucose 94 70 - 99 mg/dL    Urea Nitrogen 12 7 - 30 mg/dL    Creatinine 0.78 0.66 - 1.25 mg/dL    GFR Estimate >90  Non  GFR Calc   >60 mL/min/1.7m2    GFR Estimate If Black >90   GFR Calc   >60 mL/min/1.7m2    Calcium 8.5 8.5 - 10.1 mg/dL    Bilirubin Total 0.6 0.2 - 1.3 mg/dL    Albumin 3.8 3.4 - 5.0 g/dL    Protein Total 7.2 6.8 - 8.8 g/dL    Alkaline Phosphatase 110 40 - 150 U/L    ALT 20 0 - 70 U/L    AST 22 0 - 45 U/L   CBC with platelets differential    Collection Time: 04/10/17  2:34 PM   Result Value Ref Range    WBC 2.0 (L) 4.0 - 11.0 10e9/L    RBC Count 3.45 (L) 4.4 - 5.9 10e12/L    Hemoglobin 12.5 (L) 13.3 - 17.7 g/dL    Hematocrit 35.2 (L) 40.0 - 53.0 %     (H) 78 - 100 fl    MCH 36.2 (H) 26.5 - 33.0 pg    MCHC 35.5 31.5 - 36.5 g/dL    RDW 18.0 (H) 10.0 - 15.0 %    Platelet Count 245 150 - 450 10e9/L    Diff Method Automated Method     % Neutrophils 54.5 %    % Lymphocytes 22.0 %    % Monocytes 20.5 %    % Eosinophils 1.5 %    % Basophils 1.0 %    % Immature Granulocytes 0.5 %    Nucleated RBCs 0 0 /100    Absolute Neutrophil 1.1 (L) 1.6 - 8.3 10e9/L    Absolute Lymphocytes 0.4 (L) 0.8 - 5.3 10e9/L    Absolute Monocytes 0.4 0.0 - 1.3 10e9/L    Absolute Eosinophils 0.0 0.0 - 0.7 10e9/L    Absolute Basophils 0.0 0.0 - 0.2 10e9/L    Abs Immature  Granulocytes 0.0 0 - 0.4 10e9/L    Absolute Nucleated RBC 0.0

## 2017-04-10 NOTE — PROGRESS NOTES
Infusion Nursing Note:  Patient presents today for Cycle 4 day 1 Avastin / Lonsurf.    Patient seen by provider today: No    Note: Patient reports he is feeling well today. He had to go to ED earlier last month for vertigo but says his symptoms have resolved. He continues to have 5-9 seizures a month and says this is under control.    Intravenous Access:  Implanted Port.    Treatment Conditions:  Lab Results   Component Value Date    HGB 12.5 04/10/2017     Lab Results   Component Value Date    WBC 2.0 04/10/2017      Lab Results   Component Value Date    ANEU 1.1 04/10/2017     Lab Results   Component Value Date     04/10/2017      Lab Results   Component Value Date     04/10/2017                   Lab Results   Component Value Date    POTASSIUM 3.8 04/10/2017           Lab Results   Component Value Date    MAG 2.2 03/04/2015            Lab Results   Component Value Date    CR 0.78 04/10/2017                   Lab Results   Component Value Date    FAYE 8.5 04/10/2017                Lab Results   Component Value Date    BILITOTAL 0.6 04/10/2017           Lab Results   Component Value Date    ALBUMIN 3.8 04/10/2017                    Lab Results   Component Value Date    ALT 20 04/10/2017           Lab Results   Component Value Date    AST 22 04/10/2017     Results reviewed, labs did NOT meet treatment parameters: ANC 1.1 does not meet Lonsurf parameters. Patient will have CBC redrawn in one week, TORB CBC on 4/17 Dr. Ramon / Abimael Sorensen RN. [employer] message sent to Ayah instructing her to follow up with the patient regarding results and subsequent treatment.  Urine protein negative - OK to give Avastin.    Post Infusion Assessment:  Patient tolerated infusion without incident.  Blood return noted pre and post infusion.  Site patent and intact, free from redness, edema or discomfort.  No evidence of extravasations.  Access discontinued per protocol.    Discharge Plan:   Discharge instructions  reviewed with: Patient.  Patient and/or family verbalized understanding of discharge instructions and all questions answered.  Copy of AVS reviewed with patient and/or family.  Patient will return 4/24 for next appointment. Lab appointment made for 4/17.  Patient discharged in stable condition accompanied by: self.  Departure Mode: Ambulatory.  Face to Face time: 0 minutes.    Abimael Sorensen RN.

## 2017-04-10 NOTE — MR AVS SNAPSHOT
After Visit Summary   4/10/2017    Deandre Alex    MRN: [id number]           Patient Information     Date Of Birth          1970        Visit Information        Provider Department      4/10/2017 2:00 PM  15 ATC;  ONCOLOGY INFUSION [facility]        Today's Diagnoses     Metastasis to brain (H)    -  1    Malignant neoplasm metastatic to lung, unspecified laterality (H)        Rectal cancer (H)          Care Instructions    Contact Numbers    [facility] [place]: [phone]  [facility] Triage:  [phone]    Call triage with chills and/or temperature greater than or equal to 100.5, uncontrolled nausea/vomiting, diarrhea, constipation, dizziness, shortness of breath, chest pain, bleeding, unexplained bruising, or any new/concerning symptoms, questions/concerns.     If you are having any concerning symptoms or wish to speak to a provider before your next infusion visit, please call your care coordinator or triage to notify them so we can adequately serve you.       After Hours: [phone]    If after hours, weekends, or holidays, call main hospital  and ask for Oncology doctor on call.         April 2017 Sunday Monday Tuesday Wednesday Thursday Friday Saturday                                 1       2     3     4     5     6     7     8       9     10     [facility] D[facility]    1:30 PM   (15 min.)   [facility] LAB DRAW   [facility] Lab Draw     [facility] ONC INFUSION 60    2:00 PM   (60 min.)    ONCOLOGY INFUSION   [facility] 11     12     13     14     15       16     17     18     19     20     21     22       23     24     25     26     27     28     [facility] RETURN SEIZURE    7:45 AM   (30 min.)   Erasto Richardson MD   [facility] Neurology 29       30                                              May 2017   Deep Monday Tuesday Wednesday Thursday Friday Saturday        1     2     3     4     5     6       7     8     [facility] DRAW   12:00 PM   (15  min.)   [facility] LAB DRAW   [facility] Lab Draw     CT CHEST ABDOMEN PELVIS WWO   12:25 PM   (20 min.)   UCCT1   [facility] CT     [facility] RETURN    3:00 PM   (15 min.)   Jewel Blanchard MD   [facility]     [facility] RETURN    3:45 PM   (30 min.)   Willis Ramon MD   [facility] ONC INFUSION 60    5:00 PM   (60 min.)   [facility] ONCOLOGY INFUSION   [facility] 9     10     11     12     13       14     15     16     17     18     19     20       21     22     23     24     25     26     27       28     29     30     31                                 Lab Results:  Recent Results (from the past 12 hour(s))   Protein qualitative urine    Collection Time: 04/10/17  2:10 PM   Result Value Ref Range    Protein Albumin Urine 30 (A) NEG mg/dL   Comprehensive metabolic panel    Collection Time: 04/10/17  2:34 PM   Result Value Ref Range    Sodium 139 133 - 144 mmol/L    Potassium 3.8 3.4 - 5.3 mmol/L    Chloride 107 94 - 109 mmol/L    Carbon Dioxide 25 20 - 32 mmol/L    Anion Gap 7 3 - 14 mmol/L    Glucose 94 70 - 99 mg/dL    Urea Nitrogen 12 7 - 30 mg/dL    Creatinine 0.78 0.66 - 1.25 mg/dL    GFR Estimate >90  Non  GFR Calc   >60 mL/min/1.7m2    GFR Estimate If Black >90   GFR Calc   >60 mL/min/1.7m2    Calcium 8.5 8.5 - 10.1 mg/dL    Bilirubin Total 0.6 0.2 - 1.3 mg/dL    Albumin 3.8 3.4 - 5.0 g/dL    Protein Total 7.2 6.8 - 8.8 g/dL    Alkaline Phosphatase 110 40 - 150 U/L    ALT 20 0 - 70 U/L    AST 22 0 - 45 U/L   CBC with platelets differential    Collection Time: 04/10/17  2:34 PM   Result Value Ref Range    WBC 2.0 (L) 4.0 - 11.0 10e9/L    RBC Count 3.45 (L) 4.4 - 5.9 10e12/L    Hemoglobin 12.5 (L) 13.3 - 17.7 g/dL    Hematocrit 35.2 (L) 40.0 - 53.0 %     (H) 78 - 100 fl    MCH 36.2 (H) 26.5 - 33.0 pg    MCHC 35.5 31.5 - 36.5 g/dL    RDW 18.0 (H) 10.0 - 15.0 %    Platelet Count 245 150 - 450  10e9/L    Diff Method Automated Method     % Neutrophils 54.5 %    % Lymphocytes 22.0 %    % Monocytes 20.5 %    % Eosinophils 1.5 %    % Basophils 1.0 %    % Immature Granulocytes 0.5 %    Nucleated RBCs 0 0 /100    Absolute Neutrophil 1.1 (L) 1.6 - 8.3 10e9/L    Absolute Lymphocytes 0.4 (L) 0.8 - 5.3 10e9/L    Absolute Monocytes 0.4 0.0 - 1.3 10e9/L    Absolute Eosinophils 0.0 0.0 - 0.7 10e9/L    Absolute Basophils 0.0 0.0 - 0.2 10e9/L    Abs Immature Granulocytes 0.0 0 - 0.4 10e9/L    Absolute Nucleated RBC 0.0              Follow-ups after your visit        Your next 10 appointments already scheduled     Apr 28, 2017  8:00 AM CDT   (Arrive by 7:45 AM)   Return Seizure with Erasto Richardson MD   [facility] Neurology ([facility])    [address] [place] [address]  3rd [facility] [address]   [phone]            May 08, 2017 12:00 PM CDT   [facility] Lab Draw with  [facility] DRAW   [facility] Draw ([facility])    [address] [place] [id number] [phone]            May 08, 2017 12:40 PM CDT   (Arrive by 12:25 PM)   CT CHEST ABDOMEN PELVIS W/O & W CONTRAST with CT1   [facility] Imaging [place] CT ([facility])    [address] [place] [id number] [phone]           Please bring any scans or X-rays taken at other hospitals, if similar tests were done. Also bring a list of your medicines, including vitamins, minerals and over-the-counter drugs. It is safest to leave personal items at home.  Be sure to tell your doctor:   If you have any allergies.   If there s any chance you are pregnant.   If you are breastfeeding.   If you have any special needs.  You may have contrast for this exam. To prepare:   Do not eat or drink for 2 hours before your exam. If you need to take medicine, you may take it with small sips of water. (We may ask you to take liquid medicine as well.)    The day before your exam, drink extra fluids at least six 8-ounce glasses (unless your doctor tells you to restrict your fluids).  Patients over 70 or patients with diabetes or kidney problems:   If you haven t had a blood test (creatinine test) within the last 30 days, go to your clinic or Diagnostic Imaging Department for this test.  If you have diabetes:   If your kidney function is normal, continue taking your metformin (Avandamet, Glucophage, Glucovance, Metaglip) on the day of your exam.   If your kidney function is abnormal, wait 48 hours before restarting this medicine.  You will have oral contrast for this exam:   You will drink the contrast at home. Get this from your clinic or Diagnostic Imaging Department. Please follow the directions given.  Please wear loose clothing, such as a sweat suit or jogging clothes. Avoid snaps, zippers and other metal. We may ask you to undress and put on a hospital gown.  If you have any questions, please call the Imaging Department where you will have your exam.            May 08, 2017  3:15 PM CDT   (Arrive by 3:00 PM)   Return Visit with Jewel Blanchard MD   [facility]    [address] [place] [id number] [phone]            May 08, 2017  4:00 PM CDT   (Arrive by 3:45 PM)   Return Visit with Willis Ramon MD   [facility] ([facility]    [address] [place] [address]   [phone]            May 08, 2017  5:00 PM CDT   Infusion 60 with UC ONCOLOGY INFUSION, UC 10 ATC   [facility] ([facility])    [address] [place] [id number] [phone]              Who to contact     If you have questions or need follow up information about today's clinic visit or your schedule please contact [facility] directly at  [phone].  Normal or non-critical lab and imaging results will be communicated to you by MyChart, letter or phone within 4 business days after the clinic has received the results. If you do not hear from us within 7 days, please contact the clinic through [email or web address] or phone. If you have a critical or abnormal lab result, we will notify you by phone as soon as possible.  Submit refill requests through [employer] or call your pharmacy and they will forward the refill request to us. Please allow 3 business days for your refill to be completed.          Additional Information About Your Visit        [email or web address] Information     [employer] gives you secure access to your electronic health record. If you see a primary care provider, you can also send messages to your care team and make appointments. If you have questions, please call your primary care clinic.  If you do not have a primary care provider, please call [phone] and they will assist you.        Care EveryWhere ID     This is your Care EveryWhere ID. This could be used by other organizations to access your [place] medical records  [id number]        Your Vitals Were     Pulse Temperature Respirations Pulse Oximetry BMI (Body Mass Index)       87 97.9  F (36.6  C) (Oral) 16 100% 24.55 kg/m2        Blood Pressure from Last 3 Encounters:   04/10/17 133/85   03/18/17 126/88   03/13/17 132/81    Weight from Last 3 Encounters:   04/10/17 79.8 kg (176 lb)   03/13/17 80.7 kg (178 lb)   03/06/17 78.3 kg (172 lb 11.2 oz)              We Performed the Following     CBC with platelets differential     Comprehensive metabolic panel     Protein qualitative urine     Treatment Conditions        Primary Care Provider Office Phone # Fax #    Erasto Richardson [phone]        [facility] [address] [facility] [address]  [facility] [address]        Thank you!     Thank you for choosing [facility]  for your care. Our goal is always to provide you with  excellent care. Hearing back from our patients is one way we can continue to improve our services. Please take a few minutes to complete the written survey that you may receive in the mail after your visit with us. Thank you!             Your Updated Medication List - Protect others around you: Learn how to safely use, store and throw away your medicines at www.disposemymeds.org.          This list is accurate as of: 4/10/17  3:11 PM.  Always use your most recent med list.                   Brand Name Dispense Instructions for use    lacosamide 200 MG Tabs tablet    VIMPAT    30 tablet    Take 1.5 tabs (300mg) by mouth twice daily       levETIRAcetam 1000 MG Tabs     120 tablet    Take 2 tabs (2000mg) by mouth twice daily       LORazepam 0.5 MG tablet    ATIVAN    60 tablet    2 tabs q 8 hrs as needed for seizures       meclizine 25 MG tablet    ANTIVERT    30 tablet    Take 1-2 tablets (25-50 mg) by mouth 3 times daily as needed for dizziness       ondansetron 8 MG ODT tab    ZOFRAN-ODT    30 tablet    Take 1 tablet (8 mg) by mouth every 8 hours as needed for nausea       * Trifluridine-Tipiracil 15-6.14 MG tablet    LONSURF    40 tablet    Take 2 tablets by mouth 2 times daily Take within 1 hour after morning and evening meals on Days 1 thru 5 and 8 thru 12 of each 28 day cycle       * Trifluridine-Tipiracil 20-8.19 MG tablet    LONSURF    40 tablet    Take 2 tablets by mouth 2 times daily within 1 hour after morning and evening meals on Days 1 thru 5 and 8 thru 12 of each 28 day cycle.       * Trifluridine-Tipiracil 15-6.14 MG tablet    LONSURF    40 tablet    Take 2 tablets by mouth 2 times daily Take within 1 hour after morning and evening meals on Days 1 thru 5 and 8 thru 12 of each 28 day cycle       * Trifluridine-Tipiracil 20-8.19 MG tablet    LONSURF    40 tablet    Take 2 tablets by mouth 2 times daily within 1 hour after morning and evening meals on Days 1 thru 5 and 8 thru 12 of each 28 day cycle.        * Trifluridine-Tipiracil 15-6.14 MG tablet    LONSURF    40 tablet    Take 2 tablets by mouth 2 times daily Take within 1 hour after morning and evening meals on Days 1 thru 5 and 8 thru 12 of each 28 day cycle       * Trifluridine-Tipiracil 20-8.19 MG tablet    LONSURF    40 tablet    Take 2 tablets by mouth 2 times daily within 1 hour after morning and evening meals on Days 1 thru 5 and 8 thru 12 of each 28 day cycle.       * Notice:  This list has 6 medication(s) that are the same as other medications prescribed for you. Read the directions carefully, and ask your doctor or other care provider to review them with you.

## 2017-04-14 NOTE — ORAL ONC MGMT
"Oral Chemotherapy Monitoring Program    Today it was the Author's pleasure to speak with Deandre in regard to his current Lonsurf regimen status.  Deandre currently in on a chemotherapy \"holiday\" due to neutropenia.  He has labs scheduled for 04/17/2017 and re-starting therapy will be considered pending these lab results.    Tye HUNTER.Ph.  [facility]  Oral Chemotherapy Program  [phone]  [email or web address]    "

## 2017-04-17 NOTE — NURSING NOTE
Chief Complaint   Patient presents with     Port Draw     hx rectal cancer.  port accessed and labs drawn by rn.       Pt with history of rectal cancer. Port accessed, labs drawn, port flushed with saline and heparin.    Vivian Soares RN

## 2017-04-17 NOTE — ORAL ONC MGMT
"Oral Chemotherapy Monitoring Program    Today the Author had the pleasure of talking with Deandre in regard to restarting his Lonsurf therapy following a \"holiday\" due to neutropenia.  Deandre's ANC has rebounded nicely to 5.3 and per in-basket sent to  Dr. Ramon Deandre is ok to restart therapy.  Dr. Ramon did not indicate any dosage change at this time.   Deandre has been informed to restart his Lonsurf therapy. Oral Oncology will continue to monitor Deandre's ANC and other labs.    Tye HUNTER.Ph.  [facility]  Oral Chemotherapy Program  [phone]  [email or web address]      "

## 2017-04-28 NOTE — LETTER
2017       RE: Deandre Alex  [address]  [place] [address]     Dear Colleague,    Thank you for referring your patient, Deandre Alex, to the [facility] NEUROLOGY at [facility]. Please see a copy of my visit note below.    INTERVAL HISTORY:   Mr. Alex is 46 years old and has had since the last time was seen some seizures.  They involve 1 extremity and these are probably 5 or 6.  He was visited through the telephone.  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  He has been on new course of chemotherapy to the brain, but apparently that is not hold the metastases growth.  His seizures have been focal without any loss of consciousness.  He has been on a medication program for his seizures which includes levetiracetam 2000 mg twice a day and lacosamide 300 mg twice a day.  He has also been on the chemotherapy, Lonsurf and also on Avastin.  His last levels of the anticonvulsants done on his visit in March was the levetiracetam was 80.6 and the lacosamide was 13.3, both considered to be probably in the therapeutic range.  His last EEG was in  and this was too inpatient monitoring on him, who had recurrent brain metastases from colorectal cancer and the conclusion of the 2 days of video EEG was left parietooccipital central slowing.  No epileptiform activity was seen, and he had some tremulousness on the right upper extremity and these were not associated with seizure activity.      We discussed over the telephone that we needed to get a concentration.  He is scheduled Monday to come in to the hospital.  We have ordered that.  We agree to okay his medication.  I will also okay his lorazepam.  We will see him for followup at the next visit.      Erasto Richardson MD         D: 2017 15:25   T: 2017 07:38   MT: TONY   Name:     DEANDRE ALEX   MRN:      [id number]        Account:      [id number]   :      1970            Service Date: 04/28/2017   Document: [id number]

## 2017-04-28 NOTE — NURSING NOTE
Chief Complaint   Patient presents with     RECHECK     [facility] RETURN - SEIZURE     Delia Hernandez MA

## 2017-04-28 NOTE — MR AVS SNAPSHOT
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 ([facility])    [address] [facility] [address]   [phone]            Jun 05, 2017 10:30 AM CDT   Infusion 60 with  ONCOLOGY INFUSION, UC 31 ATC   [facility] ([facility])    [address] [facility] [address]   [phone]            Jul 10, 2017  7:20 AM CDT   (Arrive by 7:05 AM)   CT CHEST ABDOMEN PELVIS W/O & W CONTRAST with UCCT2   [facility] ([facility])    [address]  1st [address]  [facility] [address]   [phone]           Please bring any scans or X-rays taken at other hospitals, if similar tests were done. Also bring a list of your medicines, including vitamins, minerals and over-the-counter drugs. It is safest to leave personal items at home.  Be sure to tell your doctor:   If you have any allergies.   If there s any chance you are pregnant.   If you are breastfeeding.   If you have any special needs.  You may have contrast for this exam. To prepare:   Do not eat or drink for 2 hours before your exam. If you need to take medicine, you may take it with small sips of water. (We may ask you to take liquid medicine as well.)   The day before your exam, drink extra fluids at least six  8-ounce glasses (unless your doctor tells you to restrict your fluids).  Patients over 70 or patients with diabetes or kidney problems:   If you haven t had a blood test (creatinine test) within the last 30 days, go to your clinic or Diagnostic Imaging Department for this test.  If you have diabetes:   If your kidney function is normal, continue taking your metformin (Avandamet, Glucophage, Glucovance, Metaglip) on the day of your exam.   If your kidney function is abnormal, wait 48 hours before restarting this medicine.  You will have oral contrast for this exam:   You will drink the contrast at home. Get this from your clinic or Diagnostic Imaging Department. Please follow the directions given.  Please wear loose clothing, such as a sweat suit or jogging clothes. Avoid snaps, zippers and other metal. We may ask you to undress and put on a hospital gown.  If you have any questions, please call the Imaging Department where you will have your exam.            Jul 10, 2017  7:45 AM CDT   (Arrive by 7:30 AM)   MR BRAIN W/O & W CONTRAST with [address] MRI ([facility])    [address] [place] [address] [phone]           Take your medicines as usual, unless your doctor tells you not to. Bring a list of your current medicines to your exam (including vitamins, minerals and over-the-counter drugs).  You will be given intravenous contrast for this exam. To prepare:   The day before your exam, drink extra fluids at least six 8-ounce glasses (unless your doctor tells you to restrict your fluids).   Have a blood test (creatinine test) within 30 days of your exam. Go to your clinic or Diagnostic Imaging Department for this test.  The MRI machine uses a strong magnet. Please wear clothes without metal (snaps, zippers). A sweatsuit works well, or we may give you a hospital gown.  Please remove any body piercings and hair extensions before you arrive.  You will also remove watches, jewelry, hairpins, wallets, dentures, partial dental plates and hearing aids. You may wear contact lenses, and you may be able to wear your rings. We have a safe place to keep your personal items, but it is safer to leave them at home.   **IMPORTANT** THE INSTRUCTIONS BELOW ARE ONLY FOR THOSE PATIENTS WHO HAVE BEEN TOLD THEY WILL RECEIVE SEDATION OR GENERAL ANESTHESIA DURING THEIR MRI PROCEDURE:  IF YOU WILL RECEIVE SEDATION (take medicine to help you relax during your exam):   You must get the medicine from your doctor before you arrive. Bring the medicine to the exam. Do not take it at home.   Arrive one hour early. Bring someone who can take you home after the test. Your medicine will make you sleepy. After the exam, you may not drive, take a bus or take a taxi by yourself.   No eating 8 hours before your exam. You may have clear liquids up until 4 hours before your exam. (Clear liquids include water, clear tea, black coffee and fruit juice without pulp.)  IF YOU WILL RECEIVE ANESTHESIA (be asleep for your exam):   Arrive 1 1/2 hours early. Bring someone who can take you home after the test. You may not drive, take a bus or take a taxi by yourself.   No eating 8 hours before your exam. You may have clear liquids up until 4 hours before your exam. (Clear liquids include water, clear tea, black coffee and fruit juice without pulp.)  Please call the Imaging Department at your exam site with any questions.            Jul 10, 2017  8:30 AM CDT   [facility]ic Lab Draw with  MASONIC LAB DRAW   [facility] Draw ([facility])    [address] [place] [address]   [phone]            Jul 10, 2017  1:00 PM CDT   Infusion 60 with  ONCOLOGY INFUSION, UC 11 ATC   [facility] ([facility])    [address] [place] [address]   [phone]            Jul 10, 2017  2:30  PM CDT   (Arrive by 2:15 PM)   Return Visit with Jewel Blanchard MD   [facility] ([facility])    [address] [address] [phone]            Jul 24, 2017  9:15 AM CDT   (Arrive by 9:00 AM)   Return Visit with Willis Ramon MD   [facility] ([facility])    [address] [address] [phone]              Who to contact     Please call your clinic at [phone] to:    Ask questions about your health    Make or cancel appointments    Discuss your medicines    Learn about your test results    Speak to your doctor   If you have compliments or concerns about an experience at your clinic, or if you wish to file a complaint, please contact [facility] Patient Relations at [phone] or email us at [email or web address]         Additional Information About Your Visit        [employer] Information     [employer] gives you secure access to your electronic health record. If you see a primary care provider, you can also send messages to your care team and make appointments. If you have questions, please call your primary care clinic.  If you do not have a primary care provider, please call [phone] and they will assist you.      [employer]merge is an electronic gateway that provides easy, online access to your medical records. With [employer], you can request a clinic appointment, read your test results, renew a prescription or communicate with your care team.     To access your existing account, please contact your [facility] or call [phone] for assistance.        Care EveryWhere ID     This is your Care EveryWhere ID. This could be used by other organizations to access your [facility] medical records  [id number]        Your Vitals Were     Pulse Height BMI (Body Mass Index)              "62 1.803 m (5' 11\") 24.3 kg/m2          Blood Pressure from Last 3 Encounters:   05/08/17 119/85   05/08/17 119/85   04/28/17 112/78    Weight from Last 3 Encounters:   05/08/17 77.6 kg (171 lb 1.6 oz)   05/08/17 77.6 kg (171 lb 1.6 oz)   04/28/17 79 kg (174 lb 3.2 oz)                 Where to get your medicines      These medications were sent to [facility]/pharmacy #[id number] - [place], [place] - [address] [place] [address]  [address] [facility] [address]     Phone:  [phone]     levETIRAcetam 1000 MG Tabs         Some of these will need a paper prescription and others can be bought over the counter.  Ask your nurse if you have questions.     Bring a paper prescription for each of these medications     lacosamide 200 MG Tabs tablet    LORazepam 0.5 MG tablet          Primary Care Provider Office Phone # Fax #    Erasto Richardson [phone]        [facility] [address] [facility] [address]  [facility] [address]        Thank you!     Thank you for choosing [facility]  for your care. Our goal is always to provide you with excellent care. Hearing back from our patients is one way we can continue to improve our services. Please take a few minutes to complete the written survey that you may receive in the mail after your visit with us. Thank you!             Your Updated Medication List - Protect others around you: Learn how to safely use, store and throw away your medicines at www.disposemymeds.org.          This list is accurate as of: 4/28/17 11:59 PM.  Always use your most recent med list.                   Brand Name Dispense Instructions for use    lacosamide 200 MG Tabs tablet    VIMPAT    30 tablet    Take 1.5 tabs (300mg) by mouth twice daily       levETIRAcetam 1000 MG Tabs     120 tablet    Take 2 tabs (2000mg) by mouth twice daily       LORazepam 0.5 MG tablet    ATIVAN    60 tablet    2 tabs q 8 hrs as needed for seizures       meclizine 25 MG tablet    ANTIVERT    30 tablet    Take 1-2 tablets " (25-50 mg) by mouth 3 times daily as needed for dizziness       ondansetron 8 MG ODT tab    ZOFRAN-ODT    30 tablet    Take 1 tablet (8 mg) by mouth every 8 hours as needed for nausea       * Trifluridine-Tipiracil 15-6.14 MG tablet    LONSURF    40 tablet    Take 2 tablets by mouth 2 times daily Take within 1 hour after morning and evening meals on Days 1 thru 5 and 8 thru 12 of each 28 day cycle       * Trifluridine-Tipiracil 20-8.19 MG tablet    LONSURF    40 tablet    Take 2 tablets by mouth 2 times daily within 1 hour after morning and evening meals on Days 1 thru 5 and 8 thru 12 of each 28 day cycle.       * Notice:  This list has 2 medication(s) that are the same as other medications prescribed for you. Read the directions carefully, and ask your doctor or other care provider to review them with you.

## 2017-04-29 NOTE — PROGRESS NOTES
INTERVAL HISTORY:   Mr. Holm is 46 years old and has had since the last time was seen some seizures.  They involve 1 extremity and these are probably 5 or 6.  He was visited through the telephone.  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  He has been on new course of chemotherapy to the brain, but apparently that is not hold the metastases growth.  His seizures have been focal without any loss of consciousness.  He has been on a medication program for his seizures which includes levetiracetam 2000 mg twice a day and lacosamide 300 mg twice a day.  He has also been on the chemotherapy, Lonsurf and also on Avastin.  His last levels of the anticonvulsants done on his visit in March was the levetiracetam was 80.6 and the lacosamide was 13.3, both considered to be probably in the therapeutic range.  His last EEG was in  and this was too inpatient monitoring on him, who had recurrent brain metastases from colorectal cancer and the conclusion of the 2 days of video EEG was left parietooccipital central slowing.  No epileptiform activity was seen, and he had some tremulousness on the right upper extremity and these were not associated with seizure activity.      We discussed over the telephone that we needed to get a concentration.  He is scheduled Monday to come in to the hospital.  We have ordered that.  We agree to okay his medication.  I will also okay his lorazepam.  We will see him for followup at the next visit.      MD SABRINA Singer MD             D: 2017 15:25   T: 2017 07:38   MT: TONY      Name:     TERE HOLM   MRN:      [id number]        Account:      [id number]   :      1970           Service Date: 2017      Document: [id number]

## 2017-05-08 NOTE — PROGRESS NOTES
Infusion Nursing Note:  Patient presents today for Cycle 21 day 1 Avastin.    Patient seen by provider today: Yes: Dr. Ramon    Note: N/A.    Intravenous Access:  Implanted Port.    Treatment Conditions:  Lab Results   Component Value Date    HGB 11.9 05/08/2017     Lab Results   Component Value Date    WBC 3.3 05/08/2017      Lab Results   Component Value Date    ANEU 2.3 05/08/2017     Lab Results   Component Value Date     05/08/2017      Lab Results   Component Value Date     05/08/2017                   Lab Results   Component Value Date    POTASSIUM 4.0 05/08/2017           Lab Results   Component Value Date    MAG 2.2 03/04/2015            Lab Results   Component Value Date    CR 0.73 05/08/2017                   Lab Results   Component Value Date    FAYE 8.9 05/08/2017                Lab Results   Component Value Date    BILITOTAL 0.8 05/08/2017           Lab Results   Component Value Date    ALBUMIN 3.7 05/08/2017                    Lab Results   Component Value Date    ALT 16 05/08/2017           Lab Results   Component Value Date    AST 20 05/08/2017     Urine protein negative.  Reviewed labs OK to proceed with today's treatment.    Post Infusion Assessment:  Patient tolerated infusion without incident.  Blood return noted pre and post infusion.  Site patent and intact, free from redness, edema or discomfort.  No evidence of extravasations.  Access discontinued per protocol.    Discharge Plan:   Prescription refills given for Jordensuesteban to  from retail pharmacy.  Discharge instructions reviewed with: Patient.  Patient and/or family verbalized understanding of discharge instructions and all questions answered.  Copy of AVS reviewed with patient and/or family.  Patient will return 6/5 for next appointment - inbasket sent to scheduling to telephone patient when appointment was made.  Patient discharged in stable condition accompanied by: self.  Departure Mode: Ambulatory.  Face to Face time:  0 minutes.    Abimael Sorensen RN.

## 2017-05-08 NOTE — MR AVS SNAPSHOT
After Visit Summary   5/8/2017    Deandre Alex    MRN: [id number]           Patient Information     Date Of Birth          1970        Visit Information        Provider Department      5/8/2017 3:15 PM Jewel Blanchard MD [facility]        Today's Diagnoses     Metastasis to brain (H)    -  1       Follow-ups after your visit        Follow-up notes from your care team     Return in about 2 months (around 7/8/2017) for Imaging.      Your next 10 appointments already scheduled     Jun 05, 2017 10:00 AM CDT   [facility] Lab Draw with  [facility] LAB DRAW   [facility] Lab Draw ([facility])    [address]  2nd Floor  [facility] [address]   [phone]            Jun 05, 2017 10:30 AM CDT   Infusion 60 with  ONCOLOGY INFUSION, UC 31 ATC   [facility] ([facility])    [address]  2nd [facility] [address]   [phone]            Jul 10, 2017  7:20 AM CDT   (Arrive by 7:05 AM)   CT CHEST ABDOMEN PELVIS W/O & W CONTRAST with UCCT2   [facility] CT ([facility])    [address]  1st Floor  [facility] [address]   [phone]           Please bring any scans or X-rays taken at other hospitals, if similar tests were done. Also bring a list of your medicines, including vitamins, minerals and over-the-counter drugs. It is safest to leave personal items at home.  Be sure to tell your doctor:   If you have any allergies.   If there s any chance you are pregnant.   If you are breastfeeding.   If you have any special needs.  You may have contrast for this exam. To prepare:   Do not eat or drink for 2 hours before your exam. If you need to take medicine, you may take it with small sips of water. (We may ask you to take liquid medicine as well.)   The day before your exam, drink extra fluids at least six 8-ounce glasses (unless your  doctor tells you to restrict your fluids).  Patients over 70 or patients with diabetes or kidney problems:   If you haven t had a blood test (creatinine test) within the last 30 days, go to your clinic or Diagnostic Imaging Department for this test.  If you have diabetes:   If your kidney function is normal, continue taking your metformin (Avandamet, Glucophage, Glucovance, Metaglip) on the day of your exam.   If your kidney function is abnormal, wait 48 hours before restarting this medicine.  You will have oral contrast for this exam:   You will drink the contrast at home. Get this from your clinic or Diagnostic Imaging Department. Please follow the directions given.  Please wear loose clothing, such as a sweat suit or jogging clothes. Avoid snaps, zippers and other metal. We may ask you to undress and put on a hospital gown.  If you have any questions, please call the Imaging Department where you will have your exam.            Jul 10, 2017  7:45 AM CDT   (Arrive by 7:30 AM)   MR BRAIN W/O & W CONTRAST with [address] MRI ([facility])    [address] [place] [address] [phone]           Take your medicines as usual, unless your doctor tells you not to. Bring a list of your current medicines to your exam (including vitamins, minerals and over-the-counter drugs).  You will be given intravenous contrast for this exam. To prepare:   The day before your exam, drink extra fluids at least six 8-ounce glasses (unless your doctor tells you to restrict your fluids).   Have a blood test (creatinine test) within 30 days of your exam. Go to your clinic or Diagnostic Imaging Department for this test.  The MRI machine uses a strong magnet. Please wear clothes without metal (snaps, zippers). A sweatsuit works well, or we may give you a hospital gown.  Please remove any body piercings and hair extensions before you arrive. You will also remove watches,  jewelry, hairpins, wallets, dentures, partial dental plates and hearing aids. You may wear contact lenses, and you may be able to wear your rings. We have a safe place to keep your personal items, but it is safer to leave them at home.   **IMPORTANT** THE INSTRUCTIONS BELOW ARE ONLY FOR THOSE PATIENTS WHO HAVE BEEN TOLD THEY WILL RECEIVE SEDATION OR GENERAL ANESTHESIA DURING THEIR MRI PROCEDURE:  IF YOU WILL RECEIVE SEDATION (take medicine to help you relax during your exam):   You must get the medicine from your doctor before you arrive. Bring the medicine to the exam. Do not take it at home.   Arrive one hour early. Bring someone who can take you home after the test. Your medicine will make you sleepy. After the exam, you may not drive, take a bus or take a taxi by yourself.   No eating 8 hours before your exam. You may have clear liquids up until 4 hours before your exam. (Clear liquids include water, clear tea, black coffee and fruit juice without pulp.)  IF YOU WILL RECEIVE ANESTHESIA (be asleep for your exam):   Arrive 1 1/2 hours early. Bring someone who can take you home after the test. You may not drive, take a bus or take a taxi by yourself.   No eating 8 hours before your exam. You may have clear liquids up until 4 hours before your exam. (Clear liquids include water, clear tea, black coffee and fruit juice without pulp.)  Please call the Imaging Department at your exam site with any questions.            Jul 10, 2017  8:30 AM CDT   [facility] Lab Draw with  [facility] LAB DRAW   [facility] Lab Draw ([facility])    [address] [place] [address]   [phone]            Jul 10, 2017  1:00 PM CDT   Infusion 60 with  ONCOLOGY INFUSION, UC 11 ATC   [facility] ([facility])    [address] [address]   [phone]            Jul 10, 2017  2:30 PM CDT   (Arrive by 2:15 PM)    Return Visit with Jewel Blanchard MD   [facility] ([facility])    [address]  2nd [facility] [address] [phone]            Jul 24, 2017  9:15 AM CDT   (Arrive by 9:00 AM)   Return Visit with Willis Ramon MD   [facility] ([facility])    [address] [address] [phone]              Who to contact     If you have questions or need follow up information about today's clinic visit or your schedule please contact [facility] directly at [phone].  Normal or non-critical lab and imaging results will be communicated to you by NuHabitathart, letter or phone within 4 business days after the clinic has received the results. If you do not hear from us within 7 days, please contact the clinic through NuHabitathart or phone. If you have a critical or abnormal lab result, we will notify you by phone as soon as possible.  Submit refill requests through [employer]umblebee or call your pharmacy and they will forward the refill request to us. Please allow 3 business days for your refill to be completed.          Additional Information About Your Visit        NuHabitatharSkilljar Information     [employer]bee gives you secure access to your electronic health record. If you see a primary care provider, you can also send messages to your care team and make appointments. If you have questions, please call your primary care clinic.  If you do not have a primary care provider, please call [phone] and they will assist you.        Care EveryWhere ID     This is your Care EveryWhere ID. This could be used by other organizations to access your [place] medical records  [id number]        Your Vitals Were     Pulse Temperature Respirations Pulse Oximetry BMI (Body Mass Index)       80 98.1  F (36.7  C) (Oral) 16 99% 23.86 kg/m2        Blood Pressure from Last 3 Encounters:   05/08/17  119/85   05/08/17 119/85   04/28/17 112/78    Weight from Last 3 Encounters:   05/08/17 77.6 kg (171 lb 1.6 oz)   05/08/17 77.6 kg (171 lb 1.6 oz)   04/28/17 79 kg (174 lb 3.2 oz)                 Today's Medication Changes          These changes are accurate as of: 5/8/17 11:59 PM.  If you have any questions, ask your nurse or doctor.               Start taking these medicines.        Dose/Directions    scopolamine 72 hr patch   Commonly known as:  TRANSDERM   Used for:  Rectal cancer (H)   Started by:  Willis Ramon MD        Apply 1 patch to hairless area behind one ear at least 4 hours before travel.  Remove old patch and change every 3 days (72 hours).   Quantity:  4 patch   Refills:  3         These medicines have changed or have updated prescriptions.        Dose/Directions    * Trifluridine-Tipiracil 15-6.14 MG tablet   Commonly known as:  LONSURF   This may have changed:  Another medication with the same name was added. Make sure you understand how and when to take each.   Used for:  Malignant neoplasm metastatic to lung, unspecified laterality (H), Metastasis to brain (H), Rectal cancer (H)   Changed by:  Mary Ellen Piper RPH        Dose:  2 tablet   Take 2 tablets by mouth 2 times daily Take within 1 hour after morning and evening meals on Days 1 thru 5 and 8 thru 12 of each 28 day cycle   Quantity:  40 tablet   Refills:  0       * Trifluridine-Tipiracil 20-8.19 MG tablet   Commonly known as:  LONSURF   This may have changed:  Another medication with the same name was added. Make sure you understand how and when to take each.   Used for:  Malignant neoplasm metastatic to lung, unspecified laterality (H), Metastasis to brain (H), Rectal cancer (H)   Changed by:  Mary Ellen Piper RPH        Dose:  2 tablet   Take 2 tablets by mouth 2 times daily within 1 hour after morning and evening meals on Days 1 thru 5 and 8 thru 12 of each 28 day cycle.   Quantity:  40 tablet   Refills:  0       *  Trifluridine-Tipiracil 15-6.14 MG tablet   Commonly known as:  LONSURF   This may have changed:  You were already taking a medication with the same name, and this prescription was added. Make sure you understand how and when to take each.   Used for:  Malignant neoplasm metastatic to lung, unspecified laterality (H), Metastasis to brain (H), Rectal cancer (H)        Dose:  2 tablet   Take 2 tablets by mouth 2 times daily Take within 1 hour after morning and evening meals on Days 1 thru 5 and 8 thru 12 of each 28 day cycle   Quantity:  40 tablet   Refills:  0       * Trifluridine-Tipiracil 20-8.19 MG tablet   Commonly known as:  LONSURF   This may have changed:  You were already taking a medication with the same name, and this prescription was added. Make sure you understand how and when to take each.   Used for:  Malignant neoplasm metastatic to lung, unspecified laterality (H), Metastasis to brain (H), Rectal cancer (H)        Dose:  2 tablet   Take 2 tablets by mouth 2 times daily within 1 hour after morning and evening meals on Days 1 thru 5 and 8 thru 12 of each 28 day cycle.   Quantity:  40 tablet   Refills:  0       * Notice:  This list has 4 medication(s) that are the same as other medications prescribed for you. Read the directions carefully, and ask your doctor or other care provider to review them with you.         Where to get your medicines      These medications were sent to [facility] #[id number] [address] [address]     Phone:  [phone]     scopolamine 72 hr patch         These medications were sent to [place], [place] - [address] [facility] [address]  [address] [facility] [address] [address]    Hours:  TRANSPLANT PHONE NUMBER [phone] Phone:  [phone]     Trifluridine-Tipiracil 15-6.14 MG tablet    Trifluridine-Tipiracil 20-8.19 MG tablet         Some of these will need a paper prescription  and others can be bought over the counter.  Ask your nurse if you have questions.     Bring a paper prescription for each of these medications     LORazepam 0.5 MG tablet                Primary Care Provider Office Phone # Fax #    Erasto Richardson [phone]        [facility] [address] [facility] [address]  [facility] [address]        Thank you!     Thank you for choosing [facility]  for your care. Our goal is always to provide you with excellent care. Hearing back from our patients is one way we can continue to improve our services. Please take a few minutes to complete the written survey that you may receive in the mail after your visit with us. Thank you!             Your Updated Medication List - Protect others around you: Learn how to safely use, store and throw away your medicines at www.disposemymeds.org.          This list is accurate as of: 5/8/17 11:59 PM.  Always use your most recent med list.                   Brand Name Dispense Instructions for use    lacosamide 200 MG Tabs tablet    VIMPAT    30 tablet    Take 1.5 tabs (300mg) by mouth twice daily       levETIRAcetam 1000 MG Tabs     120 tablet    Take 2 tabs (2000mg) by mouth twice daily       LORazepam 0.5 MG tablet    ATIVAN    60 tablet    2 tabs q 8 hrs as needed for seizures       meclizine 25 MG tablet    ANTIVERT    30 tablet    Take 1-2 tablets (25-50 mg) by mouth 3 times daily as needed for dizziness       ondansetron 8 MG ODT tab    ZOFRAN-ODT    30 tablet    Take 1 tablet (8 mg) by mouth every 8 hours as needed for nausea       scopolamine 72 hr patch    TRANSDERM    4 patch    Apply 1 patch to hairless area behind one ear at least 4 hours before travel.  Remove old patch and change every 3 days (72 hours).       * Trifluridine-Tipiracil 15-6.14 MG tablet    LONSURF    40 tablet    Take 2 tablets by mouth 2 times daily Take within 1 hour after morning and evening meals on Days 1 thru 5 and 8 thru 12 of each  28 day cycle       * Trifluridine-Tipiracil 20-8.19 MG tablet    LONSURF    40 tablet    Take 2 tablets by mouth 2 times daily within 1 hour after morning and evening meals on Days 1 thru 5 and 8 thru 12 of each 28 day cycle.       * Trifluridine-Tipiracil 15-6.14 MG tablet    LONSURF    40 tablet    Take 2 tablets by mouth 2 times daily Take within 1 hour after morning and evening meals on Days 1 thru 5 and 8 thru 12 of each 28 day cycle       * Trifluridine-Tipiracil 20-8.19 MG tablet    LONSURF    40 tablet    Take 2 tablets by mouth 2 times daily within 1 hour after morning and evening meals on Days 1 thru 5 and 8 thru 12 of each 28 day cycle.       * Notice:  This list has 4 medication(s) that are the same as other medications prescribed for you. Read the directions carefully, and ask your doctor or other care provider to review them with you.

## 2017-05-08 NOTE — NURSING NOTE
"Oncology Rooming Note    May 8, 2017 3:41 PM   Deandre Alex is a 46 year old male who presents for:    Chief Complaint   Patient presents with     RECHECK     patient here with brain tumor - here for provider visit follow up     Initial Vitals: /85 (BP Location: Right arm)  Pulse 80  Temp 98.1  F (36.7  C) (Oral)  Resp 16  Wt 77.6 kg (171 lb 1.6 oz)  SpO2 99%  BMI 23.86 kg/m2 Estimated body mass index is 23.86 kg/(m^2) as calculated from the following:    Height as of 4/28/17: 1.803 m (5' 11\").    Weight as of this encounter: 77.6 kg (171 lb 1.6 oz). Body surface area is 1.97 meters squared.  Data Unavailable Comment: Data Unavailable   No LMP for male patient.  Allergies reviewed: Yes  Medications reviewed: Yes    Medications: Medication refills not needed today.  Pharmacy name entered into [employer]: [id number] - [place], [place] - [address] [place] [address]    Clinical concerns: NA provider was NOT notified.    7 minutes for nursing intake (face to face time)     Julissa Fuentes MA              "

## 2017-05-08 NOTE — NURSING NOTE
"Oncology Rooming Note    May 8, 2017 4:06 PM   Deandre Alex is a 46 year old male who presents for:    Chief Complaint   Patient presents with     Port Draw     Labs drawn by RN from port. VS taken.      Oncology Clinic Visit     Metastasis to lung (H)     Initial Vitals: /85 (BP Location: Right arm, Patient Position: Chair, Cuff Size: Adult Regular)  Pulse 80  Temp 98.1  F (36.7  C) (Oral)  Resp 16  Wt 77.6 kg (171 lb 1.6 oz)  SpO2 99%  BMI 23.86 kg/m2 Estimated body mass index is 23.86 kg/(m^2) as calculated from the following:    Height as of 4/28/17: 1.803 m (5' 11\").    Weight as of this encounter: 77.6 kg (171 lb 1.6 oz). Body surface area is 1.97 meters squared.  No Pain (0) Comment: Data Unavailable   No LMP for male patient.  Allergies reviewed: Yes  Medications reviewed: Yes    Medications: MEDICATION REFILLS NEEDED TODAY. Provider was notified.  Pharmacy name entered into [employer]: [id number] - [place], [place] - [address] [place] [address]    Clinical concerns: NA provider was NOT notified.    0 minutes for nursing intake (face to face time)     Julissa Fuentes MA              "

## 2017-05-08 NOTE — MR AVS SNAPSHOT
After Visit Summary   5/8/2017    Deandre Alex    MRN: [id number]           Patient Information     Date Of Birth          1970        Visit Information        Provider Department      5/8/2017 4:00 PM Willis Ramon MD [facility]        Today's Diagnoses     Rectal cancer (H)        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)        Metastasis to brain (H)        Localization-related focal epilepsy with simple partial seizures (H)        Malignant neoplasm metastatic to lung, unspecified laterality (H)           Follow-ups after your visit        Follow-up notes from your care team     Return in about 2 months (around 7/10/2017) for MD visit with CT and labs.      Your next 10 appointments already scheduled     Jun 05, 2017 10:00 AM CDT   [facility] Draw with  [facility] DRAW   [facility] ([facility])    [address] [place] [id number]   [phone]            Jun 05, 2017 10:30 AM CDT   Infusion 60 with  ONCOLOGY INFUSION, UC 31 ATC   [facility] ([facility])    [address] [place] [address]   [phone]            Jul 10, 2017  7:20 AM CDT   (Arrive by 7:05 AM)   CT CHEST ABDOMEN PELVIS W/O & W CONTRAST with UCCT2   [facility] ([facility])    [address] [place] [address]   [phone]           Please bring any scans or X-rays taken at other hospitals, if similar tests were done. Also bring a list of your medicines, including vitamins, minerals and over-the-counter drugs. It is safest to leave personal items at home.  Be sure to tell your doctor:   If you have any allergies.   If there s any chance you are pregnant.   If you are breastfeeding.   If you have any special needs.  You may have contrast for this exam. To prepare:   Do not eat or drink  for 2 hours before your exam. If you need to take medicine, you may take it with small sips of water. (We may ask you to take liquid medicine as well.)   The day before your exam, drink extra fluids at least six 8-ounce glasses (unless your doctor tells you to restrict your fluids).  Patients over 70 or patients with diabetes or kidney problems:   If you haven t had a blood test (creatinine test) within the last 30 days, go to your clinic or Diagnostic Imaging Department for this test.  If you have diabetes:   If your kidney function is normal, continue taking your metformin (Avandamet, Glucophage, Glucovance, Metaglip) on the day of your exam.   If your kidney function is abnormal, wait 48 hours before restarting this medicine.  You will have oral contrast for this exam:   You will drink the contrast at home. Get this from your clinic or Diagnostic Imaging Department. Please follow the directions given.  Please wear loose clothing, such as a sweat suit or jogging clothes. Avoid snaps, zippers and other metal. We may ask you to undress and put on a hospital gown.  If you have any questions, please call the Imaging Department where you will have your exam.            Jul 10, 2017  7:45 AM CDT   (Arrive by 7:30 AM)   MR BRAIN W/O & W CONTRAST with [address] [facility] ([facility])    [address] [address] [phone]           Take your medicines as usual, unless your doctor tells you not to. Bring a list of your current medicines to your exam (including vitamins, minerals and over-the-counter drugs).  You will be given intravenous contrast for this exam. To prepare:   The day before your exam, drink extra fluids at least six 8-ounce glasses (unless your doctor tells you to restrict your fluids).   Have a blood test (creatinine test) within 30 days of your exam. Go to your clinic or Diagnostic Imaging Department for this test.  The MRI  machine uses a strong magnet. Please wear clothes without metal (snaps, zippers). A sweatsuit works well, or we may give you a hospital gown.  Please remove any body piercings and hair extensions before you arrive. You will also remove watches, jewelry, hairpins, wallets, dentures, partial dental plates and hearing aids. You may wear contact lenses, and you may be able to wear your rings. We have a safe place to keep your personal items, but it is safer to leave them at home.   **IMPORTANT** THE INSTRUCTIONS BELOW ARE ONLY FOR THOSE PATIENTS WHO HAVE BEEN TOLD THEY WILL RECEIVE SEDATION OR GENERAL ANESTHESIA DURING THEIR MRI PROCEDURE:  IF YOU WILL RECEIVE SEDATION (take medicine to help you relax during your exam):   You must get the medicine from your doctor before you arrive. Bring the medicine to the exam. Do not take it at home.   Arrive one hour early. Bring someone who can take you home after the test. Your medicine will make you sleepy. After the exam, you may not drive, take a bus or take a taxi by yourself.   No eating 8 hours before your exam. You may have clear liquids up until 4 hours before your exam. (Clear liquids include water, clear tea, black coffee and fruit juice without pulp.)  IF YOU WILL RECEIVE ANESTHESIA (be asleep for your exam):   Arrive 1 1/2 hours early. Bring someone who can take you home after the test. You may not drive, take a bus or take a taxi by yourself.   No eating 8 hours before your exam. You may have clear liquids up until 4 hours before your exam. (Clear liquids include water, clear tea, black coffee and fruit juice without pulp.)  Please call the Imaging Department at your exam site with any questions.            Jul 10, 2017  8:30 AM CDT   [facility] Lab Draw with  [facility] LAB DRAW   [facility] Draw ([facility])    [address]  [address] [facility] [address] [phone]            Jul 10, 2017  1:00 PM CDT   Infusion 60  with UC ONCOLOGY INFUSION, UC 11 ATC   [facility] ([facility])    [address]  2nd [address]  [facility] [address] [phone]            Jul 10, 2017  2:30 PM CDT   (Arrive by 2:15 PM)   Return Visit with Jewel Blanchard MD   [facility] ([facility])    [address]  [address] [facility] [address] [phone]            Jul 24, 2017  9:15 AM CDT   (Arrive by 9:00 AM)   Return Visit with Willis Ramon MD   [facility] ([facility])    [address]  2nd [address]  [facility] [address] [phone]              Future tests that were ordered for you today     Open Future Orders        Priority Expected Expires Ordered    CT Chest Abdomen Pelvis w/o & w Contrast Routine 7/10/2017 8/21/2017 5/8/2017    Comprehensive metabolic panel Routine 7/10/2017 8/21/2017 5/8/2017    CBC with platelets differential Routine 7/10/2017 8/21/2017 5/8/2017    CEA Routine 7/10/2017 8/21/2017 5/8/2017    MR Brain w/o & w Contrast Routine 7/8/2017 6/23/2026 5/8/2017            Who to contact     If you have questions or need follow up information about today's clinic visit or your schedule please contact [facility] directly at [phone].  Normal or non-critical lab and imaging results will be communicated to you by [employer], letter or phone within 4 business days after the clinic has received the results. If you do not hear from us within 7 days, please contact the clinic through [employer] or phone. If you have a critical or abnormal lab result, we will notify you by phone as soon as possible.  Submit refill requests through [employer] or call your pharmacy and they will forward the refill request to us. Please allow 3 business days for your refill to be completed.          Additional Information About Your Visit        MyChart Information      [employer] Wizard App gives you secure access to your electronic health record. If you see a primary care provider, you can also send messages to your care team and make appointments. If you have questions, please call your primary care clinic.  If you do not have a primary care provider, please call [phone] and they will assist you.        Care EveryWhere ID     This is your Care EveryWhere ID. This could be used by other organizations to access your [facility] medical records  [id number]        Your Vitals Were     Pulse Temperature Respirations Pulse Oximetry BMI (Body Mass Index)       80 98.1  F (36.7  C) (Oral) 16 99% 23.86 kg/m2        Blood Pressure from Last 3 Encounters:   05/08/17 119/85   05/08/17 119/85   04/28/17 112/78    Weight from Last 3 Encounters:   05/08/17 77.6 kg (171 lb 1.6 oz)   05/08/17 77.6 kg (171 lb 1.6 oz)   04/28/17 79 kg (174 lb 3.2 oz)              We Performed the Following     CBC with platelets differential     Comprehensive metabolic panel     Lacosamide Vimpat     Levetiracetam level          Today's Medication Changes          These changes are accurate as of: 5/8/17 11:59 PM.  If you have any questions, ask your nurse or doctor.               Start taking these medicines.        Dose/Directions    scopolamine 72 hr patch   Commonly known as:  TRANSDERM   Used for:  Rectal cancer (H)   Started by:  Willis Ramon MD        Apply 1 patch to hairless area behind one ear at least 4 hours before travel.  Remove old patch and change every 3 days (72 hours).   Quantity:  4 patch   Refills:  3         These medicines have changed or have updated prescriptions.        Dose/Directions    * Trifluridine-Tipiracil 15-6.14 MG tablet   Commonly known as:  LONSURF   This may have changed:  Another medication with the same name was added. Make sure you understand how and when to take each.   Used for:  Malignant neoplasm metastatic to lung, unspecified laterality (H), Metastasis to brain  (H), Rectal cancer (H)   Changed by:  Mary Ellen Piper RP        Dose:  2 tablet   Take 2 tablets by mouth 2 times daily Take within 1 hour after morning and evening meals on Days 1 thru 5 and 8 thru 12 of each 28 day cycle   Quantity:  40 tablet   Refills:  0       * Trifluridine-Tipiracil 20-8.19 MG tablet   Commonly known as:  LONSURF   This may have changed:  Another medication with the same name was added. Make sure you understand how and when to take each.   Used for:  Malignant neoplasm metastatic to lung, unspecified laterality (H), Metastasis to brain (H), Rectal cancer (H)   Changed by:  Mary Ellen Piper RPH        Dose:  2 tablet   Take 2 tablets by mouth 2 times daily within 1 hour after morning and evening meals on Days 1 thru 5 and 8 thru 12 of each 28 day cycle.   Quantity:  40 tablet   Refills:  0       * Trifluridine-Tipiracil 15-6.14 MG tablet   Commonly known as:  LONSURF   This may have changed:  You were already taking a medication with the same name, and this prescription was added. Make sure you understand how and when to take each.   Used for:  Malignant neoplasm metastatic to lung, unspecified laterality (H), Metastasis to brain (H), Rectal cancer (H)        Dose:  2 tablet   Take 2 tablets by mouth 2 times daily Take within 1 hour after morning and evening meals on Days 1 thru 5 and 8 thru 12 of each 28 day cycle   Quantity:  40 tablet   Refills:  0       * Trifluridine-Tipiracil 20-8.19 MG tablet   Commonly known as:  LONSURF   This may have changed:  You were already taking a medication with the same name, and this prescription was added. Make sure you understand how and when to take each.   Used for:  Malignant neoplasm metastatic to lung, unspecified laterality (H), Metastasis to brain (H), Rectal cancer (H)        Dose:  2 tablet   Take 2 tablets by mouth 2 times daily within 1 hour after morning and evening meals on Days 1 thru 5 and 8 thru 12 of each 28 day cycle.   Quantity:  40  tablet   Refills:  0       * Notice:  This list has 4 medication(s) that are the same as other medications prescribed for you. Read the directions carefully, and ask your doctor or other care provider to review them with you.         Where to get your medicines      These medications were sent to [facility]/pharmacy #[id number] - [place], [place] - [address] [place] [address]  [address] [address]     Phone:  [phone]     scopolamine 72 hr patch         These medications were sent to [place], [place] - [address]  [address] [address]    Hours:  TRANSPLANT PHONE NUMBER [phone] Phone:  [phone]     Trifluridine-Tipiracil 15-6.14 MG tablet    Trifluridine-Tipiracil 20-8.19 MG tablet         Some of these will need a paper prescription and others can be bought over the counter.  Ask your nurse if you have questions.     Bring a paper prescription for each of these medications     LORazepam 0.5 MG tablet                Primary Care Provider Office Phone # Fax #    Erasto Richardson [phone]        [facility] [address] [facility] [address]  [facility] [address]        Thank you!     Thank you for choosing [facility]  for your care. Our goal is always to provide you with excellent care. Hearing back from our patients is one way we can continue to improve our services. Please take a few minutes to complete the written survey that you may receive in the mail after your visit with us. Thank you!             Your Updated Medication List - Protect others around you: Learn how to safely use, store and throw away your medicines at www.disposemymeds.org.          This list is accurate as of: 5/8/17 11:59 PM.  Always use your most recent med list.                   Brand Name Dispense Instructions for use    lacosamide 200 MG Tabs tablet    VIMPAT    30 tablet    Take 1.5 tabs (300mg) by mouth twice daily        levETIRAcetam 1000 MG Tabs     120 tablet    Take 2 tabs (2000mg) by mouth twice daily       LORazepam 0.5 MG tablet    ATIVAN    60 tablet    2 tabs q 8 hrs as needed for seizures       meclizine 25 MG tablet    ANTIVERT    30 tablet    Take 1-2 tablets (25-50 mg) by mouth 3 times daily as needed for dizziness       ondansetron 8 MG ODT tab    ZOFRAN-ODT    30 tablet    Take 1 tablet (8 mg) by mouth every 8 hours as needed for nausea       scopolamine 72 hr patch    TRANSDERM    4 patch    Apply 1 patch to hairless area behind one ear at least 4 hours before travel.  Remove old patch and change every 3 days (72 hours).       * Trifluridine-Tipiracil 15-6.14 MG tablet    LONSURF    40 tablet    Take 2 tablets by mouth 2 times daily Take within 1 hour after morning and evening meals on Days 1 thru 5 and 8 thru 12 of each 28 day cycle       * Trifluridine-Tipiracil 20-8.19 MG tablet    LONSURF    40 tablet    Take 2 tablets by mouth 2 times daily within 1 hour after morning and evening meals on Days 1 thru 5 and 8 thru 12 of each 28 day cycle.       * Trifluridine-Tipiracil 15-6.14 MG tablet    LONSURF    40 tablet    Take 2 tablets by mouth 2 times daily Take within 1 hour after morning and evening meals on Days 1 thru 5 and 8 thru 12 of each 28 day cycle       * Trifluridine-Tipiracil 20-8.19 MG tablet    LONSURF    40 tablet    Take 2 tablets by mouth 2 times daily within 1 hour after morning and evening meals on Days 1 thru 5 and 8 thru 12 of each 28 day cycle.       * Notice:  This list has 4 medication(s) that are the same as other medications prescribed for you. Read the directions carefully, and ask your doctor or other care provider to review them with you.

## 2017-05-08 NOTE — LETTER
5/8/2017       RE: Deandre Alex  [address]  [place] [address]     Dear Colleague,    Thank you for referring your patient, Deandre Alex, to the [facility]. Please see a copy of my visit note below.    It was a pleasure to see Deandre Alex today in Neurosurgery Clinic. he is a 46 year old male with metastatic colorectal cancer and a L posterior frontal metastasis. Has been reasonably well. Slow decline of RUE function.    Vitals:    05/08/17 1500   BP: 119/85   BP Location: Right arm   Pulse: 80   Resp: 16   Temp: 98.1  F (36.7  C)   TempSrc: Oral   SpO2: 99%   Weight: 77.6 kg (171 lb 1.6 oz)     Body mass index is 23.86 kg/(m^2).  Data Unavailable    R hemiparesis.    MRI continues to show slow progression of tumor. Minimal to no edema. The imaging was shown to the patient and reviewed in clinic.       A: Brain metastasis, colorectal cancer.    P: Continue current therapy plan. May consider further surgery if needed.    Again, thank you for allowing me to participate in the care of your patient.      Sincerely,    Jewel Blanchard MD

## 2017-05-08 NOTE — MR AVS SNAPSHOT
After Visit Summary   5/8/2017    Deandre Alex    MRN: [id number]           Patient Information     Date Of Birth          1970        Visit Information        Provider Department      5/8/2017 5:00 PM  10 ATC;  ONCOLOGY INFUSION [facility]        Today's Diagnoses     Malignant neoplasm metastatic to lung, unspecified laterality (H)    -  1    Metastasis to brain (H)        Rectal cancer (H)        Encounter for long-term (current) use of medications          Care Instructions    Contact Numbers    [facility] [place]: [phone]  [facility] [place]:  [phone]    Call triage with chills and/or temperature greater than or equal to 100.5, uncontrolled nausea/vomiting, diarrhea, constipation, dizziness, shortness of breath, chest pain, bleeding, unexplained bruising, or any new/concerning symptoms, questions/concerns.     If you are having any concerning symptoms or wish to speak to a provider before your next infusion visit, please call your care coordinator or triage to notify them so we can adequately serve you.       After Hours: [phone]    If after hours, weekends, or holidays, call main hospital  and ask for Oncology doctor on call.         May 2017   Deep Monday Tuesday Wednesday Thursday Friday Saturday        1     2     3     4     5     6       7     8     [facility] LAB DRAW   12:00 PM   (15 min.)   [facility] LAB DRAW   [facility] Lab Draw     CT CHEST ABDOMEN PELVIS WWO   12:25 PM   (20 min.)   UCCT1   [facility] CT     MR BRAIN WWO    2:15 PM   (45 min.)   [id number]   [facility] MRI     [facility] RETURN    3:00 PM   (15 min.)   Jewel Blanchard MD   [facility]     [facility] RETURN    3:45 PM   (30 min.)   Willis Ramon MD   [facility] ONC INFUSION 60    5:00 PM   (60 min.)    ONCOLOGY INFUSION   [facility] 9     10     11     12     13       14      15     16     17     18     19     20       21     22     23     24     25     26     27       28     29     30     31 June 2017 Sunday Monday Tuesday Wednesday Thursday Friday Saturday                       1     2     3       4     5     6     7     8     9     10       11     12     13     14     15     16     17       18     19     20     21     22     23     24       25     26     27     28     29     30                       Lab Results:  Recent Results (from the past 12 hour(s))   CEA    Collection Time: 05/08/17 12:57 PM   Result Value Ref Range    CEA 4.1 (H) 0 - 2.5 ug/L   CBC with platelets differential    Collection Time: 05/08/17 12:57 PM   Result Value Ref Range    WBC 3.3 (L) 4.0 - 11.0 10e9/L    RBC Count 3.36 (L) 4.4 - 5.9 10e12/L    Hemoglobin 11.9 (L) 13.3 - 17.7 g/dL    Hematocrit 33.8 (L) 40.0 - 53.0 %     (H) 78 - 100 fl    MCH 35.4 (H) 26.5 - 33.0 pg    MCHC 35.2 31.5 - 36.5 g/dL    RDW 14.6 10.0 - 15.0 %    Platelet Count 109 (L) 150 - 450 10e9/L    Diff Method Automated Method     % Neutrophils 68.6 %    % Lymphocytes 17.2 %    % Monocytes 7.9 %    % Eosinophils 4.5 %    % Basophils 0.3 %    % Immature Granulocytes 1.5 %    Nucleated RBCs 0 0 /100    Absolute Neutrophil 2.3 1.6 - 8.3 10e9/L    Absolute Lymphocytes 0.6 (L) 0.8 - 5.3 10e9/L    Absolute Monocytes 0.3 0.0 - 1.3 10e9/L    Absolute Eosinophils 0.2 0.0 - 0.7 10e9/L    Absolute Basophils 0.0 0.0 - 0.2 10e9/L    Abs Immature Granulocytes 0.1 0 - 0.4 10e9/L    Absolute Nucleated RBC 0.0    Comprehensive metabolic panel    Collection Time: 05/08/17 12:57 PM   Result Value Ref Range    Sodium 140 133 - 144 mmol/L    Potassium 4.0 3.4 - 5.3 mmol/L    Chloride 104 94 - 109 mmol/L    Carbon Dioxide 28 20 - 32 mmol/L    Anion Gap 8 3 - 14 mmol/L    Glucose 100 (H) 70 - 99 mg/dL    Urea Nitrogen 14 7 - 30 mg/dL    Creatinine 0.73 0.66 - 1.25 mg/dL    GFR Estimate >90  Non  GFR Calc   >60  mL/min/1.7m2    GFR Estimate If Black >90   GFR Calc   >60 mL/min/1.7m2    Calcium 8.9 8.5 - 10.1 mg/dL    Bilirubin Total 0.8 0.2 - 1.3 mg/dL    Albumin 3.7 3.4 - 5.0 g/dL    Protein Total 7.4 6.8 - 8.8 g/dL    Alkaline Phosphatase 117 40 - 150 U/L    ALT 16 0 - 70 U/L    AST 20 0 - 45 U/L   Protein qualitative urine    Collection Time: 05/08/17  5:10 PM   Result Value Ref Range    Protein Albumin Urine Negative NEG mg/dL             Follow-ups after your visit        Your next 10 appointments already scheduled     Jul 10, 2017  7:20 AM CDT   (Arrive by 7:05 AM)   CT CHEST ABDOMEN PELVIS W/O & W CONTRAST with UCCT2   [facility] CT ([facility])    [address] [address] [phone]           Please bring any scans or X-rays taken at other hospitals, if similar tests were done. Also bring a list of your medicines, including vitamins, minerals and over-the-counter drugs. It is safest to leave personal items at home.  Be sure to tell your doctor:   If you have any allergies.   If there s any chance you are pregnant.   If you are breastfeeding.   If you have any special needs.  You may have contrast for this exam. To prepare:   Do not eat or drink for 2 hours before your exam. If you need to take medicine, you may take it with small sips of water. (We may ask you to take liquid medicine as well.)   The day before your exam, drink extra fluids at least six 8-ounce glasses (unless your doctor tells you to restrict your fluids).  Patients over 70 or patients with diabetes or kidney problems:   If you haven t had a blood test (creatinine test) within the last 30 days, go to your clinic or Diagnostic Imaging Department for this test.  If you have diabetes:   If your kidney function is normal, continue taking your metformin (Avandamet, Glucophage, Glucovance, Metaglip) on the day of your exam.   If your kidney function is abnormal,  wait 48 hours before restarting this medicine.  You will have oral contrast for this exam:   You will drink the contrast at home. Get this from your clinic or Diagnostic Imaging Department. Please follow the directions given.  Please wear loose clothing, such as a sweat suit or jogging clothes. Avoid snaps, zippers and other metal. We may ask you to undress and put on a hospital gown.  If you have any questions, please call the Imaging Department where you will have your exam.            Jul 10, 2017  7:45 AM CDT   (Arrive by 7:30 AM)   MR BRAIN W/O & W CONTRAST with [address] MRI ([facility])    [address] [address] [phone]           Take your medicines as usual, unless your doctor tells you not to. Bring a list of your current medicines to your exam (including vitamins, minerals and over-the-counter drugs).  You will be given intravenous contrast for this exam. To prepare:   The day before your exam, drink extra fluids at least six 8-ounce glasses (unless your doctor tells you to restrict your fluids).   Have a blood test (creatinine test) within 30 days of your exam. Go to your clinic or Diagnostic Imaging Department for this test.  The MRI machine uses a strong magnet. Please wear clothes without metal (snaps, zippers). A sweatsuit works well, or we may give you a hospital gown.  Please remove any body piercings and hair extensions before you arrive. You will also remove watches, jewelry, hairpins, wallets, dentures, partial dental plates and hearing aids. You may wear contact lenses, and you may be able to wear your rings. We have a safe place to keep your personal items, but it is safer to leave them at home.   **IMPORTANT** THE INSTRUCTIONS BELOW ARE ONLY FOR THOSE PATIENTS WHO HAVE BEEN TOLD THEY WILL RECEIVE SEDATION OR GENERAL ANESTHESIA DURING THEIR MRI PROCEDURE:  IF YOU WILL RECEIVE SEDATION (take medicine to  help you relax during your exam):   You must get the medicine from your doctor before you arrive. Bring the medicine to the exam. Do not take it at home.   Arrive one hour early. Bring someone who can take you home after the test. Your medicine will make you sleepy. After the exam, you may not drive, take a bus or take a taxi by yourself.   No eating 8 hours before your exam. You may have clear liquids up until 4 hours before your exam. (Clear liquids include water, clear tea, black coffee and fruit juice without pulp.)  IF YOU WILL RECEIVE ANESTHESIA (be asleep for your exam):   Arrive 1 1/2 hours early. Bring someone who can take you home after the test. You may not drive, take a bus or take a taxi by yourself.   No eating 8 hours before your exam. You may have clear liquids up until 4 hours before your exam. (Clear liquids include water, clear tea, black coffee and fruit juice without pulp.)  Please call the Imaging Department at your exam site with any questions.            Jul 10, 2017  8:30 AM CDT   [facility] Lab Draw with  [employer] LAB DRAW   [facility] Lab Draw ([facility])    [address] [place] [id number] [phone]            Jul 24, 2017  9:15 AM CDT   (Arrive by 9:00 AM)   Return Visit with Willis Ramon MD   [facility] ([facility])    [address] [place] [id number] [phone]              Future tests that were ordered for you today     Open Future Orders        Priority Expected Expires Ordered    CT Chest Abdomen Pelvis w/o & w Contrast Routine 7/10/2017 8/21/2017 5/8/2017    Comprehensive metabolic panel Routine 7/10/2017 8/21/2017 5/8/2017    CBC with platelets differential Routine 7/10/2017 8/21/2017 5/8/2017    CEA Routine 7/10/2017 8/21/2017 5/8/2017    MR Brain w/o & w Contrast Routine 7/8/2017 6/23/2026 5/8/2017            Who to contact     If you  have questions or need follow up information about today's clinic visit or your schedule please contact [facility] directly at [phone].  Normal or non-critical lab and imaging results will be communicated to you by MyChart, letter or phone within 4 business days after the clinic has received the results. If you do not hear from us within 7 days, please contact the clinic through Securushart or phone. If you have a critical or abnormal lab result, we will notify you by phone as soon as possible.  Submit refill requests through [employer]beo or call your pharmacy and they will forward the refill request to us. Please allow 3 business days for your refill to be completed.          Additional Information About Your Visit        [employer]urushar[employer]omba Information     [employer] gives you secure access to your electronic health record. If you see a primary care provider, you can also send messages to your care team and make appointments. If you have questions, please call your primary care clinic.  If you do not have a primary care provider, please call [phone] and they will assist you.        Care EveryWhere ID     This is your Care EveryWhere ID. This could be used by other organizations to access your [place] medical records  [id number]         Blood Pressure from Last 3 Encounters:   05/08/17 119/85   05/08/17 119/85   04/28/17 112/78    Weight from Last 3 Encounters:   05/08/17 77.6 kg (171 lb 1.6 oz)   05/08/17 77.6 kg (171 lb 1.6 oz)   04/28/17 79 kg (174 lb 3.2 oz)              We Performed the Following     CEA     Protein qualitative urine          Today's Medication Changes          These changes are accurate as of: 5/8/17  5:55 PM.  If you have any questions, ask your nurse or doctor.               Start taking these medicines.        Dose/Directions    scopolamine 72 hr patch   Commonly known as:  TRANSDERM   Used for:  Rectal cancer (H)   Started by:  Willis Ramon MD        Apply 1 patch to  hairless area behind one ear at least 4 hours before travel.  Remove old patch and change every 3 days (72 hours).   Quantity:  4 patch   Refills:  3         These medicines have changed or have updated prescriptions.        Dose/Directions    * Trifluridine-Tipiracil 15-6.14 MG tablet   Commonly known as:  LONSURF   This may have changed:  Another medication with the same name was added. Make sure you understand how and when to take each.   Used for:  Malignant neoplasm metastatic to lung, unspecified laterality (H), Metastasis to brain (H), Rectal cancer (H)   Changed by:  Mary Ellen Piper RP        Dose:  2 tablet   Take 2 tablets by mouth 2 times daily Take within 1 hour after morning and evening meals on Days 1 thru 5 and 8 thru 12 of each 28 day cycle   Quantity:  40 tablet   Refills:  0       * Trifluridine-Tipiracil 20-8.19 MG tablet   Commonly known as:  LONSURF   This may have changed:  Another medication with the same name was added. Make sure you understand how and when to take each.   Used for:  Malignant neoplasm metastatic to lung, unspecified laterality (H), Metastasis to brain (H), Rectal cancer (H)   Changed by:  Mary Ellen Piper [facility]        Dose:  2 tablet   Take 2 tablets by mouth 2 times daily within 1 hour after morning and evening meals on Days 1 thru 5 and 8 thru 12 of each 28 day cycle.   Quantity:  40 tablet   Refills:  0       * Trifluridine-Tipiracil 15-6.14 MG tablet   Commonly known as:  LONSURF   This may have changed:  You were already taking a medication with the same name, and this prescription was added. Make sure you understand how and when to take each.   Used for:  Malignant neoplasm metastatic to lung, unspecified laterality (H), Metastasis to brain (H), Rectal cancer (H)        Dose:  2 tablet   Take 2 tablets by mouth 2 times daily Take within 1 hour after morning and evening meals on Days 1 thru 5 and 8 thru 12 of each 28 day cycle   Quantity:  40 tablet   Refills:  0       *  Trifluridine-Tipiracil 20-8.19 MG tablet   Commonly known as:  LONSURF   This may have changed:  You were already taking a medication with the same name, and this prescription was added. Make sure you understand how and when to take each.   Used for:  Malignant neoplasm metastatic to lung, unspecified laterality (H), Metastasis to brain (H), Rectal cancer (H)        Dose:  2 tablet   Take 2 tablets by mouth 2 times daily within 1 hour after morning and evening meals on Days 1 thru 5 and 8 thru 12 of each 28 day cycle.   Quantity:  40 tablet   Refills:  0       * Notice:  This list has 4 medication(s) that are the same as other medications prescribed for you. Read the directions carefully, and ask your doctor or other care provider to review them with you.         Where to get your medicines      These medications were sent to [facility]/pharmacy #[id number] [facility] [address] [place] [address]     Phone:  [phone]     scopolamine 72 hr patch         These medications were sent to [place]  [facility] [address]273  [address] [facility] [address] [id number]    Hours:  TRANSPLANT PHONE NUMBER [phone] Phone:  [phone]     Trifluridine-Tipiracil 15-6.14 MG tablet    Trifluridine-Tipiracil 20-8.19 MG tablet         Some of these will need a paper prescription and others can be bought over the counter.  Ask your nurse if you have questions.     Bring a paper prescription for each of these medications     LORazepam 0.5 MG tablet                Primary Care Provider Office Phone # Fax #    Erasto Richardson [phone]        [facility] [address] [facility] [address]  [facility] [address]        Thank you!     Thank you for choosing [facility]  for your care. Our goal is always to provide you with excellent care. Hearing back from our patients is one way we can continue to improve our services.  Please take a few minutes to complete the written survey that you may receive in the mail after your visit with us. Thank you!             Your Updated Medication List - Protect others around you: Learn how to safely use, store and throw away your medicines at www.disposemymeds.org.          This list is accurate as of: 5/8/17  5:55 PM.  Always use your most recent med list.                   Brand Name Dispense Instructions for use    lacosamide 200 MG Tabs tablet    VIMPAT    30 tablet    Take 1.5 tabs (300mg) by mouth twice daily       levETIRAcetam 1000 MG Tabs     120 tablet    Take 2 tabs (2000mg) by mouth twice daily       LORazepam 0.5 MG tablet    ATIVAN    60 tablet    2 tabs q 8 hrs as needed for seizures       meclizine 25 MG tablet    ANTIVERT    30 tablet    Take 1-2 tablets (25-50 mg) by mouth 3 times daily as needed for dizziness       ondansetron 8 MG ODT tab    ZOFRAN-ODT    30 tablet    Take 1 tablet (8 mg) by mouth every 8 hours as needed for nausea       scopolamine 72 hr patch    TRANSDERM    4 patch    Apply 1 patch to hairless area behind one ear at least 4 hours before travel.  Remove old patch and change every 3 days (72 hours).       * Trifluridine-Tipiracil 15-6.14 MG tablet    LONSURF    40 tablet    Take 2 tablets by mouth 2 times daily Take within 1 hour after morning and evening meals on Days 1 thru 5 and 8 thru 12 of each 28 day cycle       * Trifluridine-Tipiracil 20-8.19 MG tablet    LONSURF    40 tablet    Take 2 tablets by mouth 2 times daily within 1 hour after morning and evening meals on Days 1 thru 5 and 8 thru 12 of each 28 day cycle.       * Trifluridine-Tipiracil 15-6.14 MG tablet    LONSURF    40 tablet    Take 2 tablets by mouth 2 times daily Take within 1 hour after morning and evening meals on Days 1 thru 5 and 8 thru 12 of each 28 day cycle       * Trifluridine-Tipiracil 20-8.19 MG tablet    LONSURF    40 tablet    Take 2 tablets by mouth 2 times daily within 1 hour  after morning and evening meals on Days 1 thru 5 and 8 thru 12 of each 28 day cycle.       * Notice:  This list has 4 medication(s) that are the same as other medications prescribed for you. Read the directions carefully, and ask your doctor or other care provider to review them with you.

## 2017-05-08 NOTE — PATIENT INSTRUCTIONS
Contact Numbers    [facility] [place]: [phone]  [facility] [place]:  [phone]    Call triage with chills and/or temperature greater than or equal to 100.5, uncontrolled nausea/vomiting, diarrhea, constipation, dizziness, shortness of breath, chest pain, bleeding, unexplained bruising, or any new/concerning symptoms, questions/concerns.     If you are having any concerning symptoms or wish to speak to a provider before your next infusion visit, please call your care coordinator or triage to notify them so we can adequately serve you.       After Hours: [phone]    If after hours, weekends, or holidays, call main hospital  and ask for Oncology doctor on call.         May 2017   Deep Monday Tuesday Wednesday Thursday Friday Saturday        1     2     3     4     5     6       7     8     [facility] LAB DRAW   12:00 PM   (15 min.)    [facility] LAB DRAW   [facility] Lab Draw     CT CHEST ABDOMEN PELVIS WWO   12:25 PM   (20 min.)   UCCT1   [facility] CT     MR BRAIN WWO    2:15 PM   (45 min.)   [id number]   [facility] MRI     [facility] RETURN    3:00 PM   (15 min.)   Jew[facility]ard [facility]    3:45 PM   (30 min.)   Willis Ramon MD   [facility] ONC INFUSION 60    5:00 PM   (60 min.)    ONCOLOGY INFUSION   [facility] 9     10     11     12     13       14     15     16     17     18     19     20       21     22     23     24     25     26     27       28     29     30     31 June 2017 Sunday Monday Tuesday Wednesday Thursday Friday Saturday                       1     2     3       4     5     6     7     8     9     10       11     12     13     14     15     16     17       18     19     20     21     22     23     24       25     26     27     28     29     30                       Lab Results:  Recent Results (from the past 12 hour(s))   CEA     Collection Time: 05/08/17 12:57 PM   Result Value Ref Range    CEA 4.1 (H) 0 - 2.5 ug/L   CBC with platelets differential    Collection Time: 05/08/17 12:57 PM   Result Value Ref Range    WBC 3.3 (L) 4.0 - 11.0 10e9/L    RBC Count 3.36 (L) 4.4 - 5.9 10e12/L    Hemoglobin 11.9 (L) 13.3 - 17.7 g/dL    Hematocrit 33.8 (L) 40.0 - 53.0 %     (H) 78 - 100 fl    MCH 35.4 (H) 26.5 - 33.0 pg    MCHC 35.2 31.5 - 36.5 g/dL    RDW 14.6 10.0 - 15.0 %    Platelet Count 109 (L) 150 - 450 10e9/L    Diff Method Automated Method     % Neutrophils 68.6 %    % Lymphocytes 17.2 %    % Monocytes 7.9 %    % Eosinophils 4.5 %    % Basophils 0.3 %    % Immature Granulocytes 1.5 %    Nucleated RBCs 0 0 /100    Absolute Neutrophil 2.3 1.6 - 8.3 10e9/L    Absolute Lymphocytes 0.6 (L) 0.8 - 5.3 10e9/L    Absolute Monocytes 0.3 0.0 - 1.3 10e9/L    Absolute Eosinophils 0.2 0.0 - 0.7 10e9/L    Absolute Basophils 0.0 0.0 - 0.2 10e9/L    Abs Immature Granulocytes 0.1 0 - 0.4 10e9/L    Absolute Nucleated RBC 0.0    Comprehensive metabolic panel    Collection Time: 05/08/17 12:57 PM   Result Value Ref Range    Sodium 140 133 - 144 mmol/L    Potassium 4.0 3.4 - 5.3 mmol/L    Chloride 104 94 - 109 mmol/L    Carbon Dioxide 28 20 - 32 mmol/L    Anion Gap 8 3 - 14 mmol/L    Glucose 100 (H) 70 - 99 mg/dL    Urea Nitrogen 14 7 - 30 mg/dL    Creatinine 0.73 0.66 - 1.25 mg/dL    GFR Estimate >90  Non  GFR Calc   >60 mL/min/1.7m2    GFR Estimate If Black >90   GFR Calc   >60 mL/min/1.7m2    Calcium 8.9 8.5 - 10.1 mg/dL    Bilirubin Total 0.8 0.2 - 1.3 mg/dL    Albumin 3.7 3.4 - 5.0 g/dL    Protein Total 7.4 6.8 - 8.8 g/dL    Alkaline Phosphatase 117 40 - 150 U/L    ALT 16 0 - 70 U/L    AST 20 0 - 45 U/L   Protein qualitative urine    Collection Time: 05/08/17  5:10 PM   Result Value Ref Range    Protein Albumin Urine Negative NEG mg/dL

## 2017-05-08 NOTE — NURSING NOTE
Chief Complaint   Patient presents with     Port Draw     Labs drawn by RN from port. VS taken.      Port accessed by RN with 20g 3/4in Power Port needle. Labs drawn, port flushed with NS and Heparin.   Elvie Hanson RN

## 2017-05-08 NOTE — LETTER
5/8/2017      RE: Deandre Alex  [address] [facility] [address]  [facility] [place] [address]       HISTORY OF PRESENT ILLNESS:  Deandre Alex is here today in followup of metastatic rectal cancer.  He has had multiple sites of metastasis resected in the past including his brain and lungs.  At present he has small-volume lung metastasis and probably some residual disease in his brain.  He is on active treatment with tipiracil and Avastin and is here for a response assessment.  Since our last visit, he feels like things are going well.  His only complaint with the chemotherapy is it makes him nauseated in spite of the antiemetics he currently has.  His seizure activity continues to be very infrequent.  The motor function on his right side is stable to slightly worse.  He is getting around well.  He is able to manage his ostomy without difficulty at present.  He is still working and overall seems to feel like he has a fairly good quality of life.  The remainder of a 10-point complete review of systems is otherwise unremarkable.      PHYSICAL EXAMINATION:   GENERAL:  He is cheerful, alert and well-appearing.   HEENT:  He has no icterus.   NECK:  He has no adenopathy in the neck or supraclavicular spaces.   LUNGS:  His lungs are clear to auscultation without dullness to percussion.   HEART:  His heart rate and rhythm are regular without murmur or gallop.   ABDOMEN:  His abdomen is soft and nontender without palpable mass or organomegaly and a healthy-appearing ostomy in the left mid abdomen.   EXTREMITIES:  He has no peripheral edema.  There is no tenderness in the calves or thighs.  He has mild weakness of his right hand and leg similar to prior exams.       RESULTS:  I reviewed with the patient and his wife his imaging.  There is not much change on his brain MR to my review; the formal reading still pending.  His largest lung nodule has increased in size by perhaps 1 or 2 mm.  There is nothing clearly new elsewhere.  His CEA level has gone  up a little bit to 4.1.       ASSESSMENT:  Metastatic rectal cancer with equivocal evidence of progression on his current regimen, which he is tolerating quite well.  After some discussion, we decided to continue on with tipiracil and Avastin and reevaluate his disease status in a couple months.  We talked some about whether it was worth trying to do a little more resection on his brain, and I discussed that with his neurosurgeon today.  I think we will wait and see how things go with the next couple months of therapy and then make a decision about whether his life expectancy is good enough otherwise to make it worth further surgery on the brain.  I gave him a scopolamine patch today to try and help with his nausea.  We talked about trying Marinol, but he does not want to take anything that might interfere with his thinking and his ability to work, so we left that off the list for the moment.         Willis Ramon MD

## 2017-05-08 NOTE — PROGRESS NOTES
It was a pleasure to see Deandre Alex today in Neurosurgery Clinic. he is a 46 year old male with metastatic colorectal cancer and a L posterior frontal metastasis. Has been reasonably well. Slow decline of RUE function.    Vitals:    05/08/17 1500   BP: 119/85   BP Location: Right arm   Pulse: 80   Resp: 16   Temp: 98.1  F (36.7  C)   TempSrc: Oral   SpO2: 99%   Weight: 77.6 kg (171 lb 1.6 oz)     Body mass index is 23.86 kg/(m^2).  Data Unavailable    R hemiparesis.    MRI continues to show slow progression of tumor. Minimal to no edema. The imaging was shown to the patient and reviewed in clinic.       A: Brain metastasis, colorectal cancer.    P: Continue current therapy plan. May consider further surgery if needed.

## 2017-05-09 NOTE — PROGRESS NOTES
HISTORY OF PRESENT ILLNESS:  Deandre Alex is here today in followup of metastatic rectal cancer.  He has had multiple sites of metastasis resected in the past including his brain and lungs.  At present he has small-volume lung metastasis and probably some residual disease in his brain.  He is on active treatment with tipiracil and Avastin and is here for a response assessment.  Since our last visit, he feels like things are going well.  His only complaint with the chemotherapy is it makes him nauseated in spite of the antiemetics he currently has.  His seizure activity continues to be very infrequent.  The motor function on his right side is stable to slightly worse.  He is getting around well.  He is able to manage his ostomy without difficulty at present.  He is still working and overall seems to feel like he has a fairly good quality of life.  The remainder of a 10-point complete review of systems is otherwise unremarkable.      PHYSICAL EXAMINATION:   GENERAL:  He is cheerful, alert and well-appearing.   HEENT:  He has no icterus.   NECK:  He has no adenopathy in the neck or supraclavicular spaces.   LUNGS:  His lungs are clear to auscultation without dullness to percussion.   HEART:  His heart rate and rhythm are regular without murmur or gallop.   ABDOMEN:  His abdomen is soft and nontender without palpable mass or organomegaly and a healthy-appearing ostomy in the left mid abdomen.   EXTREMITIES:  He has no peripheral edema.  There is no tenderness in the calves or thighs.  He has mild weakness of his right hand and leg similar to prior exams.       RESULTS:  I reviewed with the patient and his wife his imaging.  There is not much change on his brain MR to my review; the formal reading still pending.  His largest lung nodule has increased in size by perhaps 1 or 2 mm.  There is nothing clearly new elsewhere.  His CEA level has gone up a little bit to 4.1.       ASSESSMENT:  Metastatic rectal cancer with  equivocal evidence of progression on his current regimen, which he is tolerating quite well.  After some discussion, we decided to continue on with tipiracil and Avastin and reevaluate his disease status in a couple months.  We talked some about whether it was worth trying to do a little more resection on his brain, and I discussed that with his neurosurgeon today.  I think we will wait and see how things go with the next couple months of therapy and then make a decision about whether his life expectancy is good enough otherwise to make it worth further surgery on the brain.  I gave him a scopolamine patch today to try and help with his nausea.  We talked about trying Marinol, but he does not want to take anything that might interfere with his thinking and his ability to work, so we left that off the list for the moment.

## 2017-05-26 NOTE — TELEPHONE ENCOUNTER
----- Message from Marlene Nash RN sent at 5/26/2017  9:51 AM CDT -----  Regarding: FW: Needs refill   Seizure patient...    Maegan Lemos  ----- Message -----     From: Nelda Frias RN     Sent: 5/26/2017   9:44 AM       To: Marlene Nash RN  Subject: Needs refill                                     Caller: Deandre    Relationship to Patient: Self    Call Back Number: [phone]    Ok to leave detailed message: Yes     Reason for Call: Needs refill on vimpat. Pharmacy does not have refills. Prescription was increased at last visit. New prescription was not sent. He is out now and unable to get refill from pharmacy as it is too soon. Please send new prescription.     Dose: 200 mg tabs. 1.5 tabs twice daily.    Pharmacy: [facility]     Yossi Lemos  (covering triage)

## 2017-06-05 NOTE — MR AVS SNAPSHOT
After Visit Summary   6/5/2017    Deandre Alex    MRN: [id number]           Patient Information     Date Of Birth          1970        Visit Information        Provider Department      6/5/2017 10:30 AM  31 ATC;  ONCOLOGY INFUSION LT[facility], located within 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     If you are having any concerning symptoms or wish to speak to a provider before your next infusion visit, please call your care coordinator or triage to notify them so we can adequately serve you.       After Hours: [phone]    If after hours, weekends, or holidays, call main hospital  and ask for Oncology doctor on call.         June 2017 Sunday Monday Tuesday Wednesday Thursday Friday Saturday                       1     2     3       4     5     [facility] LAB DRAW   10:00 AM   (15 min.)    [facility]RAW   [facility]     [facility] ONC INFUSION 60   10:30 AM   (60 min.)    ONCOLOGY INFUSION   [facility] 6     7     8     9     10       11     12     [facility] DRAW    7:15 AM   (15 min.)    [facility]RAW   [facility] 13     14     15     16     17       18     19     20     21     22     23     24       25     26     27     28     29     30                     July 2017 Sunday Monday Tuesday Wednesday Thursday Friday Saturday                                 1       2     3     4     5     6      7     8       9     10     CT CHEST ABDOMEN PELVIS WWO    7:05 AM   (20 min.)   UCCT2   [facility] CT     MR BRAIN WWO    7:30 AM   (45 min.)   UCMR1   [facility] MRI     [facility] LAB DRAW    8:30 AM   (15 min.)    [facility] LAB DRAW   [facility] Lab Draw     [facility] ONC INFUSION 60    1:00 PM   (60 min.)    ONCOLOGY INFUSION   [facility]     [facility] RETURN    2:15 PM   (15 min.)   Jewel Blanchard MD   [facility] 11     12     13     14     15       16     17     18     19     20     21     22       23     24     [facility] RETURN    9:00 AM   (30 min.)   Willis Ramon MD   [facility] 25     26     27     28     29       30     31                                          Recent Results (from the past 24 hour(s))   CBC with platelets differential    Collection Time: 06/05/17 10:33 AM   Result Value Ref Range    WBC 1.6 (L) 4.0 - 11.0 10e9/L    RBC Count 3.15 (L) 4.4 - 5.9 10e12/L    Hemoglobin 11.3 (L) 13.3 - 17.7 g/dL    Hematocrit 32.0 (L) 40.0 - 53.0 %     (H) 78 - 100 fl    MCH 35.9 (H) 26.5 - 33.0 pg    MCHC 35.3 31.5 - 36.5 g/dL    RDW 15.8 (H) 10.0 - 15.0 %    Platelet Count 92 (L) 150 - 450 10e9/L    Diff Method Manual Differential     % Neutrophils 67.6 %    % Lymphocytes 22.8 %    % Monocytes 7.0 %    % Eosinophils 2.6 %    % Basophils 0.0 %    Absolute Neutrophil 1.1 (L) 1.6 - 8.3 10e9/L    Absolute Lymphocytes 0.4 (L) 0.8 - 5.3 10e9/L    Absolute Monocytes 0.1 0.0 - 1.3 10e9/L    Absolute Eosinophils 0.0 0.0 - 0.7 10e9/L    Absolute Basophils 0.0 0.0 - 0.2 10e9/L    Anisocytosis Slight     Poikilocytosis Slight     Ovalocytes Slight     Macrocytes Present    Comprehensive metabolic panel    Collection Time: 06/05/17 10:33 AM   Result Value Ref Range    Sodium 138 133 - 144 mmol/L    Potassium 4.0 3.4 - 5.3 mmol/L    Chloride 105 94 - 109 mmol/L    Carbon Dioxide 26 20 - 32 mmol/L    Anion Gap 7 3 - 14  mmol/L    Glucose 85 70 - 99 mg/dL    Urea Nitrogen 16 7 - 30 mg/dL    Creatinine 0.86 0.66 - 1.25 mg/dL    GFR Estimate >90  Non  GFR Calc   >60 mL/min/1.7m2    GFR Estimate If Black >90   GFR Calc   >60 mL/min/1.7m2    Calcium 8.6 8.5 - 10.1 mg/dL    Bilirubin Total 1.1 0.2 - 1.3 mg/dL    Albumin 3.8 3.4 - 5.0 g/dL    Protein Total 7.2 6.8 - 8.8 g/dL    Alkaline Phosphatase 121 40 - 150 U/L    ALT 23 0 - 70 U/L    AST 27 0 - 45 U/L   Protein qualitative urine    Collection Time: 06/05/17 10:44 AM   Result Value Ref Range    Protein Albumin Urine 30 (A) NEG mg/dL                 Follow-ups after your visit        Your next 10 appointments already scheduled     Jun 12, 2017  7:15 AM CDT   [facility] Lab Draw with  MASONIC LAB DRAW   [facility] Masonic Lab Draw ([facility])    [address] [place] [id number] [phone]            Jul 10, 2017  7:20 AM CDT   (Arrive by 7:05 AM)   CT CHEST ABDOMEN PELVIS W/O & W CONTRAST with UCCT2   [facility] Imaging [place] CT ([facility])    [address] [place] [id number] [phone]           Please bring any scans or X-rays taken at other hospitals, if similar tests were done. Also bring a list of your medicines, including vitamins, minerals and over-the-counter drugs. It is safest to leave personal items at home.  Be sure to tell your doctor:   If you have any allergies.   If there s any chance you are pregnant.   If you are breastfeeding.   If you have any special needs.  You may have contrast for this exam. To prepare:   Do not eat or drink for 2 hours before your exam. If you need to take medicine, you may take it with small sips of water. (We may ask you to take liquid medicine as well.)   The day before your exam, drink extra fluids at least six 8-ounce glasses (unless your doctor tells you to restrict your fluids).  Patients over 70 or  patients with diabetes or kidney problems:   If you haven t had a blood test (creatinine test) within the last 30 days, go to your clinic or Diagnostic Imaging Department for this test.  If you have diabetes:   If your kidney function is normal, continue taking your metformin (Avandamet, Glucophage, Glucovance, Metaglip) on the day of your exam.   If your kidney function is abnormal, wait 48 hours before restarting this medicine.  You will have oral contrast for this exam:   You will drink the contrast at home. Get this from your clinic or Diagnostic Imaging Department. Please follow the directions given.  Please wear loose clothing, such as a sweat suit or jogging clothes. Avoid snaps, zippers and other metal. We may ask you to undress and put on a hospital gown.  If you have any questions, please call the Imaging Department where you will have your exam.            Jul 10, 2017  7:45 AM CDT   (Arrive by 7:30 AM)   MR BRAIN W/O & W CONTRAST with [address] MRI ([facility])    [address] [place] [id number] [phone]           Take your medicines as usual, unless your doctor tells you not to. Bring a list of your current medicines to your exam (including vitamins, minerals and over-the-counter drugs).  You will be given intravenous contrast for this exam. To prepare:   The day before your exam, drink extra fluids at least six 8-ounce glasses (unless your doctor tells you to restrict your fluids).   Have a blood test (creatinine test) within 30 days of your exam. Go to your clinic or Diagnostic Imaging Department for this test.  The MRI machine uses a strong magnet. Please wear clothes without metal (snaps, zippers). A sweatsuit works well, or we may give you a hospital gown.  Please remove any body piercings and hair extensions before you arrive. You will also remove watches, jewelry, hairpins, wallets, dentures, partial dental plates and  hearing aids. You may wear contact lenses, and you may be able to wear your rings. We have a safe place to keep your personal items, but it is safer to leave them at home.   **IMPORTANT** THE INSTRUCTIONS BELOW ARE ONLY FOR THOSE PATIENTS WHO HAVE BEEN TOLD THEY WILL RECEIVE SEDATION OR GENERAL ANESTHESIA DURING THEIR MRI PROCEDURE:  IF YOU WILL RECEIVE SEDATION (take medicine to help you relax during your exam):   You must get the medicine from your doctor before you arrive. Bring the medicine to the exam. Do not take it at home.   Arrive one hour early. Bring someone who can take you home after the test. Your medicine will make you sleepy. After the exam, you may not drive, take a bus or take a taxi by yourself.   No eating 8 hours before your exam. You may have clear liquids up until 4 hours before your exam. (Clear liquids include water, clear tea, black coffee and fruit juice without pulp.)  IF YOU WILL RECEIVE ANESTHESIA (be asleep for your exam):   Arrive 1 1/2 hours early. Bring someone who can take you home after the test. You may not drive, take a bus or take a taxi by yourself.   No eating 8 hours before your exam. You may have clear liquids up until 4 hours before your exam. (Clear liquids include water, clear tea, black coffee and fruit juice without pulp.)  Please call the Imaging Department at your exam site with any questions.            Jul 10, 2017  8:30 AM T   [facility]   [facility] Lab Draw ([facility])    [address] [facility]  [address]nd [facility] [address]   [phone]            Jul 10, 2017  1:00 PM CDT   Infusion 60 with  ONCOLOGY INFUSION,  11 ATC   [facility] Cancer [facility] ([facility])    [address] [address]   [phone]            Jul 10, 2017  2:30 PM CDT   (Arrive by 2:15 PM)   Return Visit with Jewel Blanchard MD   [facility]  Cancer Clinic ([facility])    [address] [facility]  [address] [facility] [id number] [phone]            Jul 24, 2017  9:15 AM CDT   (Arrive by 9:00 AM)   Return Visit with Willis Ramon MD   [facility] Cancer Clinic ([facility])    [address] [id number] [phone]              Who to contact     If you have questions or need follow up information about today's clinic visit or your schedule please contact [facility] CANCER [facility] directly at [phone].  Normal or non-critical lab and imaging results will be communicated to you by MyChart, letter or phone within 4 business days after the clinic has received the results. If you do not hear from us within 7 days, please contact the clinic through [employer] or phone. If you have a critical or abnormal lab result, we will notify you by phone as soon as possible.  Submit refill requests through [employer] or call your pharmacy and they will forward the refill request to us. Please allow 3 business days for your refill to be completed.          Additional Information About Your Visit        [employer] Information     [employer] gives you secure access to your electronic health record. If you see a primary care provider, you can also send messages to your care team and make appointments. If you have questions, please call your primary care clinic.  If you do not have a primary care provider, please call [phone] and they will assist you.        Care EveryWhere ID     This is your Care EveryWhere ID. This could be used by other organizations to access your [place] medical records  [id number]        Your Vitals Were     Pulse Temperature Respirations Pulse Oximetry BMI (Body Mass Index)       79 97.7  F (36.5  C) 16 98% 23.79 kg/m2        Blood Pressure from Last 3 Encounters:   06/05/17 122/78   05/08/17 119/85   05/08/17 119/85    Weight from Last 3  Encounters:   06/05/17 77.4 kg (170 lb 9.6 oz)   05/08/17 77.6 kg (171 lb 1.6 oz)   05/08/17 77.6 kg (171 lb 1.6 oz)              We Performed the Following     CBC with platelets differential     Comprehensive metabolic panel     Protein qualitative urine          Today's Medication Changes          These changes are accurate as of: 6/5/17 12:04 PM.  If you have any questions, ask your nurse or doctor.               Stop taking these medicines if you haven't already. Please contact your care team if you have questions.     Trifluridine-Tipiracil 20-8.19 MG tablet   Commonly known as:  LONSURF                    Primary Care Provider Office Phone # Fax #    Erasto Richardson [phone]        [facility] [address] [facility] [address]  [facility] [address]        Thank you!     Thank you for choosing [facility]  for your care. Our goal is always to provide you with excellent care. Hearing back from our patients is one way we can continue to improve our services. Please take a few minutes to complete the written survey that you may receive in the mail after your visit with us. Thank you!             Your Updated Medication List - Protect others around you: Learn how to safely use, store and throw away your medicines at www.disposemymeds.org.          This list is accurate as of: 6/5/17 12:04 PM.  Always use your most recent med list.                   Brand Name Dispense Instructions for use    lacosamide 200 MG Tabs tablet    VIMPAT    90 tablet    Take 1.5 tabs (300mg) by mouth twice daily       levETIRAcetam 1000 MG Tabs     120 tablet    Take 2 tabs (2000mg) by mouth twice daily       LORazepam 0.5 MG tablet    ATIVAN    60 tablet    2 tabs q 8 hrs as needed for seizures       ondansetron 8 MG ODT tab    ZOFRAN-ODT    30 tablet    Take 1 tablet (8 mg) by mouth every 8 hours as needed for nausea

## 2017-06-05 NOTE — PROGRESS NOTES
Infusion Nursing Note:  Deandre Alex presents today for Cycle 22 Day 1 Avastin.    Patient seen by provider today: No.    Note: TORB 6/5/17 1145 Nicky Dorantes NP/Sunita Sanchez RN: Hold Lonsurf for ANC of 1.1. Ok to give Avastin. Have patient come back in one week for CBC check. Notify Pharmacy Oncology Team to f/u with patient after CBC result.     Patient reminded of neutropenic precautions. Patient verbalized understanding of instructions.     Message sent to pharmacy team.     Intravenous Access:  Implanted Port.    Treatment Conditions:  Lab Results   Component Value Date    HGB 11.3 06/05/2017     Lab Results   Component Value Date    WBC 1.6 06/05/2017      Lab Results   Component Value Date    ANEU 1.1 06/05/2017     Lab Results   Component Value Date    PLT 92 06/05/2017      Lab Results   Component Value Date     06/05/2017                   Lab Results   Component Value Date    POTASSIUM 4.0 06/05/2017           Lab Results   Component Value Date    MAG 2.2 03/04/2015            Lab Results   Component Value Date    CR 0.86 06/05/2017                   Lab Results   Component Value Date    FAYE 8.6 06/05/2017                Lab Results   Component Value Date    BILITOTAL 1.1 06/05/2017           Lab Results   Component Value Date    ALBUMIN 3.8 06/05/2017                    Lab Results   Component Value Date    ALT 23 06/05/2017           Lab Results   Component Value Date    AST 27 06/05/2017     Results reviewed, labs did NOT meet treatment parameters: ANC of 1.1 for Lonsurf.  Urine 1+.      Post Infusion Assessment:  Patient tolerated infusion without incident.  Blood return noted pre and post infusion.  Site patent and intact, free from redness, edema or discomfort.  No evidence of extravasations.  Access discontinued per protocol.    Discharge Plan:   Patient declined prescription refills.  Copy of AVS reviewed with patient and/or family. Patient will return 6/12/17 for next appointment - CBC  recheck. Avastin will be given next in 5 weeks instead of 4 weeks because patient is on a holiday.   Patient discharged in stable condition accompanied by: self.  Departure Mode: Ambulatory.    Sunita Sanchez RN

## 2017-06-05 NOTE — NURSING NOTE
Chief Complaint   Patient presents with     Port Draw     accessed with power needle for labs, heparin locked, vitals checked

## 2017-06-05 NOTE — PATIENT INSTRUCTIONS
Contact Numbers    [facility] [place] [phone]  [facility] [place]:  [phone]    Call triage with chills and/or temperature greater than or equal to 100.5, uncontrolled nausea/vomiting, diarrhea, constipation, dizziness, shortness of breath, chest pain, bleeding, unexplained bruising, or any new/concerning symptoms, questions/concerns.     If you are having any concerning symptoms or wish to speak to a provider before your next infusion visit, please call your care coordinator or triage to notify them so we can adequately serve you.       After Hours: [phone]    If after hours, weekends, or holidays, call main hospital  and ask for Oncology doctor on call.         June 2017 Sunday Monday Tuesday Wednesday Thursday Friday Saturday                       1     2     3       4     5     [facility] D[facility]   10:00 AM   (15 min.)    [facility] D[facility]   [facility] Draw     [facility] ONC INFUSION 60   10:30 AM   (60 min.)    ONCOLOGY INFUSION   [facility] 6     7     8     9     10       11     12     [facility] DRAW    7:15 AM   (15 min.)    [facility] DRAW   [facility] Draw 13     14     15     16     17       18     19     20     21     22     23     24       25     26     27     28     29     30                     July 2017 Sunday Monday Tuesday Wednesday Thursday Friday Saturday                                 1       2     3     4     5     6     7     8       9     10     CT CHEST ABDOMEN PELVIS WWO    7:05 AM   (20 min.)   UCCT2   [facility] CT     MR BRAIN WWO    7:30 AM   (45 min.)   UCMR1   [facility] MRI     [facility] DRAW    8:30 AM   (15 min.)    [facility] DRAW   [facility] Lab Draw     [facility] ONC INFUSION 60    1:00 PM   (60 min.)    ONCOLOGY INFUSION   [facility]     [facility] RETURN    2:15 PM   (15 min.)   Jewel Blanchard MD   [facility] 11     12     13     14      15       16     17     18     19     20     21     22       23     24     [facility] RETURN    9:00 AM   (30 min.)   Willis Ramon MD   [facility] 25     26     27     28     29       30     31                                          Recent Results (from the past 24 hour(s))   CBC with platelets differential    Collection Time: 06/05/17 10:33 AM   Result Value Ref Range    WBC 1.6 (L) 4.0 - 11.0 10e9/L    RBC Count 3.15 (L) 4.4 - 5.9 10e12/L    Hemoglobin 11.3 (L) 13.3 - 17.7 g/dL    Hematocrit 32.0 (L) 40.0 - 53.0 %     (H) 78 - 100 fl    MCH 35.9 (H) 26.5 - 33.0 pg    MCHC 35.3 31.5 - 36.5 g/dL    RDW 15.8 (H) 10.0 - 15.0 %    Platelet Count 92 (L) 150 - 450 10e9/L    Diff Method Manual Differential     % Neutrophils 67.6 %    % Lymphocytes 22.8 %    % Monocytes 7.0 %    % Eosinophils 2.6 %    % Basophils 0.0 %    Absolute Neutrophil 1.1 (L) 1.6 - 8.3 10e9/L    Absolute Lymphocytes 0.4 (L) 0.8 - 5.3 10e9/L    Absolute Monocytes 0.1 0.0 - 1.3 10e9/L    Absolute Eosinophils 0.0 0.0 - 0.7 10e9/L    Absolute Basophils 0.0 0.0 - 0.2 10e9/L    Anisocytosis Slight     Poikilocytosis Slight     Ovalocytes Slight     Macrocytes Present    Comprehensive metabolic panel    Collection Time: 06/05/17 10:33 AM   Result Value Ref Range    Sodium 138 133 - 144 mmol/L    Potassium 4.0 3.4 - 5.3 mmol/L    Chloride 105 94 - 109 mmol/L    Carbon Dioxide 26 20 - 32 mmol/L    Anion Gap 7 3 - 14 mmol/L    Glucose 85 70 - 99 mg/dL    Urea Nitrogen 16 7 - 30 mg/dL    Creatinine 0.86 0.66 - 1.25 mg/dL    GFR Estimate >90  Non  GFR Calc   >60 mL/min/1.7m2    GFR Estimate If Black >90   GFR Calc   >60 mL/min/1.7m2    Calcium 8.6 8.5 - 10.1 mg/dL    Bilirubin Total 1.1 0.2 - 1.3 mg/dL    Albumin 3.8 3.4 - 5.0 g/dL    Protein Total 7.2 6.8 - 8.8 g/dL    Alkaline Phosphatase 121 40 - 150 U/L    ALT 23 0 - 70 U/L    AST 27 0 - 45 U/L   Protein qualitative urine    Collection Time:  06/05/17 10:44 AM   Result Value Ref Range    Protein Albumin Urine 30 (A) NEG mg/dL

## 2017-06-12 NOTE — NURSING NOTE
Chief Complaint   Patient presents with     Port Draw     Labs Drawn      Port accessed. Labs drawn. Flushed with heparin and NS. Port de accessed. Updated RNCC pt awaiting lab results for oral chemo.     Lauren Schoen, RN

## 2017-06-13 NOTE — ORAL ONC MGMT
Oral Chemotherapy Monitoring Program    Primary Oncologist: Dr. Ramon  Primary Oncology Clinic: [facility]  Cancer Diagnosis:  Colon     Today it was the Author s pleasure to place a routine courtesy call to Deandre today for follow up regarding holding Lonsurf & getting follow-up labs Friday.   No answer. Left voicemail for patient to please call me back at [phone] when able. No patient or medication names were mentioned while leaving this message.    Tye HUNTER.Ph.  [facility]  Oral Chemotherapy Program  [phone]  [email or web address]

## 2017-06-16 NOTE — NURSING NOTE
Chief Complaint   Patient presents with     Port Draw     Labs collected via port by RN.      Port accessed, labs collected, flushed with normal saline and heparin.    Ebony Christensen RN

## 2017-06-16 NOTE — ORAL ONC MGMT
"Oral Chemotherapy Monitoring Program     Primary Oncologist: Dr. Ramon  Primary Oncology Clinic: [facility]   Cancer Diagnosis: Rectal Cancer     Therapy History:  Lonsurf 70mg bid on days 1 - 5 and 6 - 12 of each 28 day cycle  Therapy History: Started January 2017. Cycle held for low counts in June 2017. Will restart at same dose 6/16/2017.    Drug Interaction Assessment: Lonsurf + Ondansetron = possible QTc prolongation. Consider checking EKG if ondansetron is used more frequently.    Lab Monitoring Plan  Monitoring Plan:    C1D1+   CBC, CMP C2D1+ Call, CBC, CMP C3D1+ Call, CBC, CMP C4D1+ Call, CBC, CMP C5D1+ Call, CBC, CMP C6D1+ Call, CBC, CMP   C1D8+  C2D8+  C3D8+  C4D8+  C5D8+  C6D8+    C1D15+ CBC C2D15+ CBC C3D15+ CBC C4D15+ CBC C5D15+ CBC C6D15+ CBC   C1D22+  C2D22+  C3D22+  C4D22+  C5D22+  C6D22+    CBC Q2 weeks  CMP monthly       Subjective/Objective:  Deandre Alex is a 46 year old male contacted by phone for a follow-up visit for oral chemotherapy.  Deandre's counts have rebounded and ANC = 1.9 today.    ORAL CHEMOTHERAPY 1/4/2017 3/23/2017 6/16/2017   Drug Name Lonsurf (Trifluridine/Tipiracil) Lonsurf (Trifluridine/Tipiracil) Lonsurf (Trifluridine/Tipiracil)   Current Dosage Other Other Other   Current Schedule Other BID Other   Cycle Details Other Days 1-5 and Days 8-12 Days 1-5 and Days 8-12   Planned next cycle start date - - 6/16/2017   Doses missed in last 2 weeks - 0 -   Adherence Assessment - Adherent -   Adverse Effects - Fatigue -   Nausea - Grade 1 -   Fatigue - Grade 1 -   Pharmacist Intervention(fatigue) - No -   Home BPs - not needed -   Any new drug interactions? - No -         Vitals:  BP:   BP Readings from Last 1 Encounters:   06/05/17 122/78     Wt Readings from Last 1 Encounters:   06/05/17 77.4 kg (170 lb 9.6 oz)     Estimated body surface area is 1.97 meters squared as calculated from the following:    Height as of 4/28/17: 1.803 m (5' 11\").    Weight as of 6/5/17: 77.4 kg (170 " lb 9.6 oz).    Labs:  Lab Results   Component Value Date     06/16/2017      Lab Results   Component Value Date    POTASSIUM 4.0 06/16/2017     Lab Results   Component Value Date    FAYE 8.6 06/16/2017     Lab Results   Component Value Date    ALBUMIN 3.9 06/16/2017     Lab Results   Component Value Date    MAG 2.2 03/04/2015     Lab Results   Component Value Date    PHOS 4.2 03/04/2015     Lab Results   Component Value Date    BUN 16 06/16/2017     Lab Results   Component Value Date    CR 0.98 06/16/2017       Lab Results   Component Value Date    AST 27 06/16/2017     Lab Results   Component Value Date    ALT 23 06/16/2017     Lab Results   Component Value Date    BILITOTAL 0.6 06/16/2017       Lab Results   Component Value Date    WBC 3.1 06/16/2017     Lab Results   Component Value Date    HGB 13.4 06/16/2017     Lab Results   Component Value Date     06/16/2017     Lab Results   Component Value Date    ANEU 1.9 06/16/2017       Assessment:  ANC improved.    Plan:  Restart Lonsurf today at same dose per plan from Nicky Dorantes. Will notify Nicky Dorantes so that she can plan for follow up.    Follow-Up:  In about 2 weeks.    Refill Due:  In 4 weeks.    Jacob Heck PharmD  [facility]  [phone]  June 16, 2017

## 2017-06-23 NOTE — TELEPHONE ENCOUNTER
Received below messages:  Contacted Dr. Richardson, who is currently out on vacation, Received message from Dr. Richardson as below.  Called patient and left detailed voice mail with call back number and name.

## 2017-06-23 NOTE — TELEPHONE ENCOUNTER
----- Message from Erasto Richardson MD sent at 6/23/2017  8:46 AM CDT -----  Regarding: RE: question  Contact: [phone]  See him when i come back soon, very soon. beverly  ----- Message -----     From: Hernan Phillips, RN     Sent: 6/21/2017  10:40 AM       To: Erasto Richardson MD  Subject: FW: question                                     See his Soon or add on ?  ----- Message -----     From: April Dumont LPN     Sent: 6/20/2017  12:49 PM       To: Hernan Phillips, RN  Subject: question                                         Caller name: patient [phone]    Treating provider/specialty: [place]  Nurse:    Best time to return call: anytime     Message left?  Should he be seen soon? please address    Description of issue/question:  symptom seizure today; total of 9 this month; 0 loss of consciousness, unwitnessed; lasted 1-7 minutes   no change in medication   No emergency room or URGENT CARE for above issue

## 2017-07-05 NOTE — TELEPHONE ENCOUNTER
Oral Chemotherapy Monitoring Program?    Attempted to contact patient today for follow up on oral chemotherapy. No answer.  Left voicemail for patient to please call me back at [phone] when able.  No patient or medication names were mentioned while leaving this message.  ?  Alicia Gooden  Pharmacy Intern  [facility]  [phone]

## 2017-07-06 NOTE — ORAL ONC MGMT
Oral Chemotherapy Monitoring Program.    Placed call to patient in follow-up of Lonsurf therapy.    Left message to return call.    Mary Ellen Piper, DorisD, BCOP, BCPS  Clinical Pharmacy Specialist/  Oncology Medication Therapy Management Pharmacist  [facility]  Pager [phone]  Phone [phone]

## 2017-07-07 NOTE — TELEPHONE ENCOUNTER
Oral Chemotherapy Monitoring Program   Placed call to patient in follow up of Lonsurf therapy.   Left message requesting call back.   No drug names were mentioned.  LM on 5/19 and 6/27 for TM.       Reinaldo Thomas, PharmD  Therapy Management Oncology Pharmacist  [facility]   Phone: [phone]

## 2017-07-10 NOTE — MR AVS SNAPSHOT
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 Lab Draw     [facility] ONC INFUSION 60    1:00 PM   (60 min.)   UC ONCOLOGY INFUSION   [facility]     [facility] RETURN    2:15 PM   (15 min.)   Jewel Blanchard MD   [facility] 11     12     13     14     15       16     17     18     19     20     21     22       23     24     [facility] RETURN    9:00 AM   (30 min.)   Willis Ramon MD   [facility] 25     26     27     28     29       30     31                                         August 2017 Sunday Monday Tuesday Wednesday Thursday Friday Saturday             1     2     3     4     5       6     7     8     9     10     11     12       13     14     15     16     17     18     19       20     21  Happy Birthday!     22     23     24     25     26       27     28     29     30     31                            Lab Results:  Recent Results (from the past 12 hour(s))   CBC with platelets differential    Collection Time: 07/10/17  9:11 AM   Result Value Ref Range    WBC 2.3  (L) 4.0 - 11.0 10e9/L    RBC Count 3.26 (L) 4.4 - 5.9 10e12/L    Hemoglobin 11.3 (L) 13.3 - 17.7 g/dL    Hematocrit 33.6 (L) 40.0 - 53.0 %     (H) 78 - 100 fl    MCH 34.7 (H) 26.5 - 33.0 pg    MCHC 33.6 31.5 - 36.5 g/dL    RDW 16.9 (H) 10.0 - 15.0 %    Platelet Count 165 150 - 450 10e9/L    Diff Method Automated Method     % Neutrophils 69.2 %    % Lymphocytes 14.1 %    % Monocytes 14.1 %    % Eosinophils 1.3 %    % Basophils 0.4 %    % Immature Granulocytes 0.9 %    Nucleated RBCs 0 0 /100    Absolute Neutrophil 1.6 1.6 - 8.3 10e9/L    Absolute Lymphocytes 0.3 (L) 0.8 - 5.3 10e9/L    Absolute Monocytes 0.3 0.0 - 1.3 10e9/L    Absolute Eosinophils 0.0 0.0 - 0.7 10e9/L    Absolute Basophils 0.0 0.0 - 0.2 10e9/L    Abs Immature Granulocytes 0.0 0 - 0.4 10e9/L    Absolute Nucleated RBC 0.0    Comprehensive metabolic panel    Collection Time: 07/10/17  9:11 AM   Result Value Ref Range    Sodium 135 133 - 144 mmol/L    Potassium 4.1 3.4 - 5.3 mmol/L    Chloride 104 94 - 109 mmol/L    Carbon Dioxide 26 20 - 32 mmol/L    Anion Gap 5 3 - 14 mmol/L    Glucose 83 70 - 99 mg/dL    Urea Nitrogen 10 7 - 30 mg/dL    Creatinine 0.87 0.66 - 1.25 mg/dL    GFR Estimate >90  Non  GFR Calc   >60 mL/min/1.7m2    GFR Estimate If Black >90   GFR Calc   >60 mL/min/1.7m2    Calcium 8.6 8.5 - 10.1 mg/dL    Bilirubin Total 0.9 0.2 - 1.3 mg/dL    Albumin 3.7 3.4 - 5.0 g/dL    Protein Total 6.9 6.8 - 8.8 g/dL    Alkaline Phosphatase 112 40 - 150 U/L    ALT 23 0 - 70 U/L    AST 27 0 - 45 U/L   CEA    Collection Time: 07/10/17  9:11 AM   Result Value Ref Range    .9 (H) 0 - 2.5 ug/L   Protein qualitative urine    Collection Time: 07/10/17  1:33 PM   Result Value Ref Range    Protein Albumin Urine Negative NEG mg/dL     Contact Numbers    [facility] Main Line: [phone]  [facility] Triage:  [phone]    Call triage with chills and/or temperature greater than or equal to 100.5, uncontrolled nausea/vomiting,  diarrhea, constipation, dizziness, shortness of breath, chest pain, bleeding, unexplained bruising, or any new/concerning symptoms, questions/concerns.     If you are having any concerning symptoms or wish to speak to a provider before your next infusion visit, please call your care coordinator or triage to notify them so we can adequately serve you.       After Hours: [phone]    If after hours, weekends, or holidays, call main hospital  and ask for Oncology doctor on call.             Follow-ups after your visit        Your next 10 appointments already scheduled     Jul 24, 2017  9:15 AM CDT   (Arrive by 9:00 AM)   Return Visit with Willis Ramon MD   [facility] ([facility])    [address]  2nd Floor  [facility] [address] [phone]              Who to contact     If you have questions or need follow up information about today's clinic visit or your schedule please contact [facility] directly at [phone].  Normal or non-critical lab and imaging results will be communicated to you by Diagnosiahart, letter or phone within 4 business days after the clinic has received the results. If you do not hear from us within 7 days, please contact the clinic through Diagnosiahart or phone. If you have a critical or abnormal lab result, we will notify you by phone as soon as possible.  Submit refill requests through [employer] or call your pharmacy and they will forward the refill request to us. Please allow 3 business days for your refill to be completed.          Additional Information About Your Visit        Datometry Information     [employer] gives you secure access to your electronic health record. If you see a primary care provider, you can also send messages to your care team and make appointments. If you have questions, please call your primary care clinic.  If you do not have a primary care provider, please call [phone] and  they will assist you.        Care EveryWhere ID     This is your Care EveryWhere ID. This could be used by other organizations to access your [place] medical records  [id number]        Your Vitals Were     Pulse Temperature Pulse Oximetry BMI (Body Mass Index)          74 97.4  F (36.3  C) (Oral) 100% 23.89 kg/m2         Blood Pressure from Last 3 Encounters:   07/10/17 116/79   06/05/17 122/78   05/08/17 119/85    Weight from Last 3 Encounters:   07/10/17 77.7 kg (171 lb 4.8 oz)   06/05/17 77.4 kg (170 lb 9.6 oz)   05/08/17 77.6 kg (171 lb 1.6 oz)              We Performed the Following     CBC with platelets differential     CEA     Comprehensive metabolic panel     MD Instruction for Protocol 2     Protein qualitative urine          Today's Medication Changes          These changes are accurate as of: 7/10/17  2:41 PM.  If you have any questions, ask your nurse or doctor.               These medicines have changed or have updated prescriptions.        Dose/Directions    * Trifluridine-Tipiracil 15-6.14 MG tablet   Commonly known as:  LONSURF   This may have changed:  Another medication with the same name was added. Make sure you understand how and when to take each.   Used for:  Malignant neoplasm metastatic to lung, unspecified laterality (H), Metastasis to brain (H), Rectal cancer (H)        Dose:  2 tablet   Take 2 tablets by mouth 2 times daily Take within 1 hour after morning and evening meals on Days 1 thru 5 and 8 thru 12 of each 28 day cycle   Quantity:  40 tablet   Refills:  0       * Trifluridine-Tipiracil 20-8.19 MG tablet   Commonly known as:  LONSURF   This may have changed:  Another medication with the same name was added. Make sure you understand how and when to take each.   Used for:  Malignant neoplasm metastatic to lung, unspecified laterality (H), Metastasis to brain (H), Rectal cancer (H)        Dose:  2 tablet   Take 2 tablets by mouth 2 times daily within 1 hour after morning and evening  meals on Days 1 thru 5 and 8 thru 12 of each 28 day cycle.   Quantity:  40 tablet   Refills:  0       * Trifluridine-Tipiracil 15-6.14 MG tablet   Commonly known as:  LONSURF   This may have changed:  You were already taking a medication with the same name, and this prescription was added. Make sure you understand how and when to take each.   Used for:  Malignant neoplasm metastatic to lung, unspecified laterality (H), Metastasis to brain (H), Rectal cancer (H)        Dose:  2 tablet   Take 2 tablets by mouth 2 times daily Take within 1 hour after morning and evening meals on Days 1 thru 5 and 8 thru 12 of each 28 day cycle   Quantity:  40 tablet   Refills:  0       * Trifluridine-Tipiracil 20-8.19 MG tablet   Commonly known as:  LONSURF   This may have changed:  You were already taking a medication with the same name, and this prescription was added. Make sure you understand how and when to take each.   Used for:  Malignant neoplasm metastatic to lung, unspecified laterality (H), Metastasis to brain (H), Rectal cancer (H)        Dose:  2 tablet   Take 2 tablets by mouth 2 times daily within 1 hour after morning and evening meals on Days 1 thru 5 and 8 thru 12 of each 28 day cycle.   Quantity:  40 tablet   Refills:  0       * Notice:  This list has 4 medication(s) that are the same as other medications prescribed for you. Read the directions carefully, and ask your doctor or other care provider to review them with you.         Where to get your medicines      These medications were sent to [address] [place] [address] [address] [address]    Hours:  TRANSPLANT PHONE NUMBER [phone] Phone:  [phone]     Trifluridine-Tipiracil 15-6.14 MG tablet    Trifluridine-Tipiracil 20-8.19 MG tablet                Primary Care Provider Office Phone # Fax #    Erasto Richardson [phone]        [facility] [address] [place] [address]  [facility] [address]  [facility] [address]        Equal Access to Services     JOY BECKFORD : Hadii uriel moon doroteo Garcia, waaxda luqadaha, qaybta kaalmagerda jane, tru johnson. So [facility] [phone].    ATENCIÓN: Si habla español, tiene a kimball disposición servicios gratuitos de asistencia lingüística. Abame al [phone].    We comply with applicable federal civil rights laws and [place] laws. We do not discriminate on the basis of race, color, national origin, age, disability sex, sexual orientation or gender identity.            Thank you!     Thank you for choosing [facility]  for your care. Our goal is always to provide you with excellent care. Hearing back from our patients is one way we can continue to improve our services. Please take a few minutes to complete the written survey that you may receive in the mail after your visit with us. Thank you!             Your Updated Medication List - Protect others around you: Learn how to safely use, store and throw away your medicines at www.disposemymeds.org.          This list is accurate as of: 7/10/17  2:41 PM.  Always use your most recent med list.                   Brand Name Dispense Instructions for use Diagnosis    lacosamide 200 MG Tabs tablet    VIMPAT    90 tablet    Take 1.5 tabs (300mg) by mouth twice daily    Localization-related focal epilepsy with simple partial seizures (H), Rectal cancer (H), Secondary malignant neoplasm of liver (H), Metastasis to brain (H)       levETIRAcetam 1000 MG Tabs     120 tablet    Take 2 tabs (2000mg) by mouth twice daily    Localization-related focal epilepsy with simple partial seizures (H), Rectal cancer (H), Secondary malignant neoplasm of liver (H), Metastasis to brain (H)       LORazepam 0.5 MG tablet    ATIVAN    60 tablet    2 tabs q 8 hrs as needed for seizures    Rectal cancer (H), Secondary malignant neoplasm of liver (H), Metastasis to brain (H), Localization-related  focal epilepsy with simple partial seizures (H)       ondansetron 8 MG ODT tab    ZOFRAN-ODT    30 tablet    Take 1 tablet (8 mg) by mouth every 8 hours as needed for nausea    Rectal cancer (H), Secondary malignant neoplasm of liver (H), Metastasis to brain (H), Malignant neoplasm metastatic to lung, unspecified laterality (H)       * Trifluridine-Tipiracil 15-6.14 MG tablet    LONSURF    40 tablet    Take 2 tablets by mouth 2 times daily Take within 1 hour after morning and evening meals on Days 1 thru 5 and 8 thru 12 of each 28 day cycle    Malignant neoplasm metastatic to lung, unspecified laterality (H), Metastasis to brain (H), Rectal cancer (H)       * Trifluridine-Tipiracil 20-8.19 MG tablet    LONSURF    40 tablet    Take 2 tablets by mouth 2 times daily within 1 hour after morning and evening meals on Days 1 thru 5 and 8 thru 12 of each 28 day cycle.    Malignant neoplasm metastatic to lung, unspecified laterality (H), Metastasis to brain (H), Rectal cancer (H)       * Trifluridine-Tipiracil 15-6.14 MG tablet    LONSURF    40 tablet    Take 2 tablets by mouth 2 times daily Take within 1 hour after morning and evening meals on Days 1 thru 5 and 8 thru 12 of each 28 day cycle    Malignant neoplasm metastatic to lung, unspecified laterality (H), Metastasis to brain (H), Rectal cancer (H)       * Trifluridine-Tipiracil 20-8.19 MG tablet    LONSURF    40 tablet    Take 2 tablets by mouth 2 times daily within 1 hour after morning and evening meals on Days 1 thru 5 and 8 thru 12 of each 28 day cycle.    Malignant neoplasm metastatic to lung, unspecified laterality (H), Metastasis to brain (H), Rectal cancer (H)       * Notice:  This list has 4 medication(s) that are the same as other medications prescribed for you. Read the directions carefully, and ask your doctor or other care provider to review them with you.

## 2017-07-10 NOTE — PATIENT INSTRUCTIONS
July 2017 Sunday Monday Tuesday Wednesday Thursday Friday Saturday                                 1       2     3     4     5     6     7     8       9     10     CT CHEST ABDOMEN PELVIS WWO    7:05 AM   (20 min.)   UCCT2   [facility] CT     MR BRAIN WWO    7:30 AM   (45 min.)   [id number]   [facility] MRI     [facility] D[facility]    8:30 AM   (15 min.)    [facility] DRAW   [facility] Lab Draw     [facility] ONC INFUSION 60    1:00 PM   (60 min.)   UC ONCOLOGY INFUSION   [facility]     [facility] RETURN    2:15 PM   (15 min.)   Jewel Blanchard MD   [facility] 11     12     13     14     15       16     17     18     19     20     21     22       23     24     [facility] RETURN    9:00 AM   (30 min.)   Willis Ramon MD   [facility] 25     26     27     28     29       30     31                                         August 2017 Sunday Monday Tuesday Wednesday Thursday Friday Saturday             1     2     3     4     5       6     7     8     9     10     11     12       13     14     15     16     17     18     19       20     21  Happy Birthday!     22     23     24     25     26       27     28     29     30     31                            Lab Results:  Recent Results (from the past 12 hour(s))   CBC with platelets differential    Collection Time: 07/10/17  9:11 AM   Result Value Ref Range    WBC 2.3 (L) 4.0 - 11.0 10e9/L    RBC Count 3.26 (L) 4.4 - 5.9 10e12/L    Hemoglobin 11.3 (L) 13.3 - 17.7 g/dL    Hematocrit 33.6 (L) 40.0 - 53.0 %     (H) 78 - 100 fl    MCH 34.7 (H) 26.5 - 33.0 pg    MCHC 33.6 31.5 - 36.5 g/dL    RDW 16.9 (H) 10.0 - 15.0 %    Platelet Count 165 150 - 450 10e9/L    Diff Method Automated Method     % Neutrophils 69.2 %    % Lymphocytes 14.1 %    % Monocytes 14.1 %    % Eosinophils 1.3 %    % Basophils 0.4 %    % Immature Granulocytes 0.9 %    Nucleated RBCs 0 0 /100    Absolute  Neutrophil 1.6 1.6 - 8.3 10e9/L    Absolute Lymphocytes 0.3 (L) 0.8 - 5.3 10e9/L    Absolute Monocytes 0.3 0.0 - 1.3 10e9/L    Absolute Eosinophils 0.0 0.0 - 0.7 10e9/L    Absolute Basophils 0.0 0.0 - 0.2 10e9/L    Abs Immature Granulocytes 0.0 0 - 0.4 10e9/L    Absolute Nucleated RBC 0.0    Comprehensive metabolic panel    Collection Time: 07/10/17  9:11 AM   Result Value Ref Range    Sodium 135 133 - 144 mmol/L    Potassium 4.1 3.4 - 5.3 mmol/L    Chloride 104 94 - 109 mmol/L    Carbon Dioxide 26 20 - 32 mmol/L    Anion Gap 5 3 - 14 mmol/L    Glucose 83 70 - 99 mg/dL    Urea Nitrogen 10 7 - 30 mg/dL    Creatinine 0.87 0.66 - 1.25 mg/dL    GFR Estimate >90  Non  GFR Calc   >60 mL/min/1.7m2    GFR Estimate If Black >90   GFR Calc   >60 mL/min/1.7m2    Calcium 8.6 8.5 - 10.1 mg/dL    Bilirubin Total 0.9 0.2 - 1.3 mg/dL    Albumin 3.7 3.4 - 5.0 g/dL    Protein Total 6.9 6.8 - 8.8 g/dL    Alkaline Phosphatase 112 40 - 150 U/L    ALT 23 0 - 70 U/L    AST 27 0 - 45 U/L   CEA    Collection Time: 07/10/17  9:11 AM   Result Value Ref Range    .9 (H) 0 - 2.5 ug/L   Protein qualitative urine    Collection Time: 07/10/17  1:33 PM   Result Value Ref Range    Protein Albumin Urine Negative NEG mg/dL     Contact Numbers    [facility] Main Line: [phone]  [facility] [place] [facility] Triage:  [phone]    Call triage with chills and/or temperature greater than or equal to 100.5, uncontrolled nausea/vomiting, diarrhea, constipation, dizziness, shortness of breath, chest pain, bleeding, unexplained bruising, or any new/concerning symptoms, questions/concerns.     If you are having any concerning symptoms or wish to speak to a provider before your next infusion visit, please call your care coordinator or triage to notify them so we can adequately serve you.       After Hours: [phone]    If after hours, weekends, or holidays, call main hospital  and ask for Oncology doctor on call.

## 2017-07-10 NOTE — PROGRESS NOTES
Infusion Nursing Note:  Deandre GERI Alex presents today for Cycle 7 Day 2 Avastin.    Patient seen by provider today: Yes: Pt to see Dr Blanchard post infusion. Pt also completed Chest CT and MRI this morning before lab draws.    Treatment Conditions:  Lab Results   Component Value Date    HGB 11.3 07/10/2017     Lab Results   Component Value Date    WBC 2.3 07/10/2017      Lab Results   Component Value Date    ANEU 1.6 07/10/2017     Lab Results   Component Value Date     07/10/2017      Lab Results   Component Value Date     07/10/2017                   Lab Results   Component Value Date    POTASSIUM 4.1 07/10/2017           Lab Results   Component Value Date    MAG 2.2 03/04/2015            Lab Results   Component Value Date    CR 0.87 07/10/2017                   Lab Results   Component Value Date    FAYE 8.6 07/10/2017                Lab Results   Component Value Date    BILITOTAL 0.9 07/10/2017           Lab Results   Component Value Date    ALBUMIN 3.7 07/10/2017                    Lab Results   Component Value Date    ALT 23 07/10/2017           Lab Results   Component Value Date    AST 27 07/10/2017     Results reviewed, labs MET treatment parameters, ok to proceed with treatment.  Urine Negative.    Intravenous Access:  Implanted Port.    Note: Pt arrived to floor alone today. Pt gave urine sample upon arrival.  Pt had no complaints and denied any concerns or questions about proceeding with treatment today.       Post Infusion Assessment:  Patient tolerated infusion without incident.  Blood return noted pre and post infusion.  Site patent and intact, free from redness, edema or discomfort.  No evidence of extravasations.  Access discontinued per protocol.    Discharge Plan:   Prescription refills given for Lorazepam, LONSURF.  Discharge instructions reviewed with: Patient.  Patient and/or family verbalized understanding of discharge instructions and all questions answered.  Copy of AVS reviewed with  patient and/or family.  Patient will return 7/24/17 for next appointment.  Patient discharged in stable condition accompanied by: self.  Departure Mode: Ambulatory.    Krista Fine RN

## 2017-07-10 NOTE — PROGRESS NOTES
It was a pleasure seeing Mr. Alex today in neurosurgery clinic. He is a 46-year-old male with metastatic colorectal cancer. He returns today for routine follow-up. He continues on Avastin and lonsurf. He feels that his neurologic function in his right upper extremity has continued to decline over the last month or 2. He has had more seizures over the last month and he typically has in a month.    There were no vitals filed for this visit.  Data Unavailable    On examination he has worsening function of the right upper extremity particularly with fine coordination.    Imaging: His MRI continues to show slow progression of the metastatic tumor. There does appear to be some worsening edema around the tumor at this point. Imaging was reviewed with the patient turned the patient in clinic today.    Assessment: Metastatic colorectal cancer with progressive brain metastasis.    Plan: We discussed the continued decline of function that I expect to be inevitable. We discussed that he may continue to worsen. He will also likely worsen rapidly if the Avastin was withdrawn. Overall I think that he has a fairly guarded prognosis. He will continue to decide what he wants to do as far as his end-of-life decision making. He will likely forego his lonsurf this month. He did also inquire about palliative consultation.

## 2017-07-10 NOTE — MR AVS SNAPSHOT
After Visit Summary   7/10/2017    Deandre Alex    MRN: [id number]           Patient Information     Date Of Birth          1970        Visit Information        Provider Department      7/10/2017 2:30 PM Jewel Blanchard MD [facility]        Today's Diagnoses     Metastasis to brain (H)    -  1       Follow-ups after your visit        Follow-up notes from your care team     Return in about 2 months (around 9/10/2017) for Imaging.      Your next 10 appointments already scheduled     Jul 24, 2017  9:15 AM CDT   (Arrive by 9:00 AM)   Return Visit with Willis Ramon MD   [facility] ([facility] [place])    [address]  2nd Floor  [facility] [address]   [phone]            Aug 11, 2017  8:30 AM CDT   (Arrive by 8:15 AM)   Return Seizure with Erasto Richardson MD   [facility] ([facility])    [address] [place] [address]  3rd Floor  [facility] [address]   [phone]            Sep 10, 2017 10:45 AM CDT   (Arrive by 10:30 AM)   MR BRAIN W/O & W CONTRAST with [address] MRI ([facility])    [address] [place] [address]  1st Floor  [facility] [id number] [phone]           Take your medicines as usual, unless your doctor tells you not to. Bring a list of your current medicines to your exam (including vitamins, minerals and over-the-counter drugs).  You will be given intravenous contrast for this exam. To prepare:   The day before your exam, drink extra fluids at least six 8-ounce glasses (unless your doctor tells you to restrict your fluids).   Have a blood test (creatinine test) within 30 days of your exam. Go to your clinic or Diagnostic Imaging Department for this test.  The MRI machine uses a strong magnet. Please wear clothes without metal (snaps, zippers). A sweatsuit works well, or we may give you a hospital gown.  Please remove any body  piercings and hair extensions before you arrive. You will also remove watches, jewelry, hairpins, wallets, dentures, partial dental plates and hearing aids. You may wear contact lenses, and you may be able to wear your rings. We have a safe place to keep your personal items, but it is safer to leave them at home.   **IMPORTANT** THE INSTRUCTIONS BELOW ARE ONLY FOR THOSE PATIENTS WHO HAVE BEEN TOLD THEY WILL RECEIVE SEDATION OR GENERAL ANESTHESIA DURING THEIR MRI PROCEDURE:  IF YOU WILL RECEIVE SEDATION (take medicine to help you relax during your exam):   You must get the medicine from your doctor before you arrive. Bring the medicine to the exam. Do not take it at home.   Arrive one hour early. Bring someone who can take you home after the test. Your medicine will make you sleepy. After the exam, you may not drive, take a bus or take a taxi by yourself.   No eating 8 hours before your exam. You may have clear liquids up until 4 hours before your exam. (Clear liquids include water, clear tea, black coffee and fruit juice without pulp.)  IF YOU WILL RECEIVE ANESTHESIA (be asleep for your exam):   Arrive 1 1/2 hours early. Bring someone who can take you home after the test. You may not drive, take a bus or take a taxi by yourself.   No eating 8 hours before your exam. You may have clear liquids up until 4 hours before your exam. (Clear liquids include water, clear tea, black coffee and fruit juice without pulp.)  Please call the Imaging Department at your exam site with any questions.              Who to contact     If you have questions or need follow up information about today's clinic visit or your schedule please contact [facility] directly at [phone].  Normal or non-critical lab and imaging results will be communicated to you by MyChart, letter or phone within 4 business days after the clinic has received the results. If you do not hear from us within 7 days, please contact the clinic  through [employer] or phone. If you have a critical or abnormal lab result, we will notify you by phone as soon as possible.  Submit refill requests through [employer] or call your pharmacy and they will forward the refill request to us. Please allow 3 business days for your refill to be completed.          Additional Information About Your Visit        [employer] Information     [employer] gives you secure access to your electronic health record. If you see a primary care provider, you can also send messages to your care team and make appointments. If you have questions, please call your primary care clinic.  If you do not have a primary care provider, please call [phone] and they will assist you.        Care EveryWhere ID     This is your Care EveryWhere ID. This could be used by other organizations to access your [facility] medical records  [id number]         Blood Pressure from Last 3 Encounters:   07/10/17 116/79   06/05/17 122/78   05/08/17 119/85    Weight from Last 3 Encounters:   07/10/17 77.7 kg (171 lb 4.8 oz)   06/05/17 77.4 kg (170 lb 9.6 oz)   05/08/17 77.6 kg (171 lb 1.6 oz)                 Today's Medication Changes          These changes are accurate as of: 7/10/17 11:59 PM.  If you have any questions, ask your nurse or doctor.               These medicines have changed or have updated prescriptions.        Dose/Directions    * Trifluridine-Tipiracil 15-6.14 MG tablet   Commonly known as:  LONSURF   This may have changed:  Another medication with the same name was added. Make sure you understand how and when to take each.   Used for:  Malignant neoplasm metastatic to lung, unspecified laterality (H), Metastasis to brain (H), Rectal cancer (H)        Dose:  2 tablet   Take 2 tablets by mouth 2 times daily Take within 1 hour after morning and evening meals on Days 1 thru 5 and 8 thru 12 of each 28 day cycle   Quantity:  40 tablet   Refills:  0       * Trifluridine-Tipiracil 20-8.19 MG tablet   Commonly known  as:  LONSURF   This may have changed:  Another medication with the same name was added. Make sure you understand how and when to take each.   Used for:  Malignant neoplasm metastatic to lung, unspecified laterality (H), Metastasis to brain (H), Rectal cancer (H)        Dose:  2 tablet   Take 2 tablets by mouth 2 times daily within 1 hour after morning and evening meals on Days 1 thru 5 and 8 thru 12 of each 28 day cycle.   Quantity:  40 tablet   Refills:  0       * Trifluridine-Tipiracil 15-6.14 MG tablet   Commonly known as:  LONSURF   This may have changed:  You were already taking a medication with the same name, and this prescription was added. Make sure you understand how and when to take each.   Used for:  Malignant neoplasm metastatic to lung, unspecified laterality (H), Metastasis to brain (H), Rectal cancer (H)        Dose:  2 tablet   Take 2 tablets by mouth 2 times daily Take within 1 hour after morning and evening meals on Days 1 thru 5 and 8 thru 12 of each 28 day cycle   Quantity:  40 tablet   Refills:  0       * Trifluridine-Tipiracil 20-8.19 MG tablet   Commonly known as:  LONSURF   This may have changed:  You were already taking a medication with the same name, and this prescription was added. Make sure you understand how and when to take each.   Used for:  Malignant neoplasm metastatic to lung, unspecified laterality (H), Metastasis to brain (H), Rectal cancer (H)        Dose:  2 tablet   Take 2 tablets by mouth 2 times daily within 1 hour after morning and evening meals on Days 1 thru 5 and 8 thru 12 of each 28 day cycle.   Quantity:  40 tablet   Refills:  0       * Notice:  This list has 4 medication(s) that are the same as other medications prescribed for you. Read the directions carefully, and ask your doctor or other care provider to review them with you.         Where to get your medicines      These medications were sent to [place], [place] - [address]  [address], [facility] [address]    Hours:  TRANSPLANT PHONE NUMBER [phone] Phone:  [phone]     Trifluridine-Tipiracil 15-6.14 MG tablet    Trifluridine-Tipiracil 20-8.19 MG tablet                Primary Care Provider Office Phone # Fax #    Erasto Richardson [phone]        [facility] [address] [place] [address] [address] [facility] [address]  [facility] [address]        Equal Access to Services     JOY BECKFORD : Hadii aad ku hadasho Soomaali, waaxda luqadaha, qaybta kaalmada adeegyada, waxay idiin hayaan adeeg khcindy foy . So [facility] [phone].    ATENCIÓN: Si habla español, tiene a kimball disposición servicios gratuitos de asistencia lingüística. Llame al [phone].    We comply with applicable federal civil rights laws and [place] laws. We do not discriminate on the basis of race, color, national origin, age, disability sex, sexual orientation or gender identity.            Thank you!     Thank you for choosing [facility]  for your care. Our goal is always to provide you with excellent care. Hearing back from our patients is one way we can continue to improve our services. Please take a few minutes to complete the written survey that you may receive in the mail after your visit with us. Thank you!             Your Updated Medication List - Protect others around you: Learn how to safely use, store and throw away your medicines at www.disposemymeds.org.          This list is accurate as of: 7/10/17 11:59 PM.  Always use your most recent med list.                   Brand Name Dispense Instructions for use Diagnosis    lacosamide 200 MG Tabs tablet    VIMPAT    90 tablet    Take 1.5 tabs (300mg) by mouth twice daily    Localization-related focal epilepsy with simple partial seizures (H), Rectal cancer (H), Secondary malignant neoplasm of liver (H), Metastasis to brain (H)       levETIRAcetam 1000 MG Tabs     120 tablet    Take 2 tabs (2000mg) by mouth twice daily     Localization-related focal epilepsy with simple partial seizures (H), Rectal cancer (H), Secondary malignant neoplasm of liver (H), Metastasis to brain (H)       LORazepam 0.5 MG tablet    ATIVAN    60 tablet    2 tabs q 8 hrs as needed for seizures    Rectal cancer (H), Secondary malignant neoplasm of liver (H), Metastasis to brain (H), Localization-related focal epilepsy with simple partial seizures (H)       ondansetron 8 MG ODT tab    ZOFRAN-ODT    30 tablet    Take 1 tablet (8 mg) by mouth every 8 hours as needed for nausea    Rectal cancer (H), Secondary malignant neoplasm of liver (H), Metastasis to brain (H), Malignant neoplasm metastatic to lung, unspecified laterality (H)       * Trifluridine-Tipiracil 15-6.14 MG tablet    LONSURF    40 tablet    Take 2 tablets by mouth 2 times daily Take within 1 hour after morning and evening meals on Days 1 thru 5 and 8 thru 12 of each 28 day cycle    Malignant neoplasm metastatic to lung, unspecified laterality (H), Metastasis to brain (H), Rectal cancer (H)       * Trifluridine-Tipiracil 20-8.19 MG tablet    LONSURF    40 tablet    Take 2 tablets by mouth 2 times daily within 1 hour after morning and evening meals on Days 1 thru 5 and 8 thru 12 of each 28 day cycle.    Malignant neoplasm metastatic to lung, unspecified laterality (H), Metastasis to brain (H), Rectal cancer (H)       * Trifluridine-Tipiracil 15-6.14 MG tablet    LONSURF    40 tablet    Take 2 tablets by mouth 2 times daily Take within 1 hour after morning and evening meals on Days 1 thru 5 and 8 thru 12 of each 28 day cycle    Malignant neoplasm metastatic to lung, unspecified laterality (H), Metastasis to brain (H), Rectal cancer (H)       * Trifluridine-Tipiracil 20-8.19 MG tablet    LONSURF    40 tablet    Take 2 tablets by mouth 2 times daily within 1 hour after morning and evening meals on Days 1 thru 5 and 8 thru 12 of each 28 day cycle.    Malignant neoplasm metastatic to lung, unspecified  laterality (H), Metastasis to brain (H), Rectal cancer (H)       * Notice:  This list has 4 medication(s) that are the same as other medications prescribed for you. Read the directions carefully, and ask your doctor or other care provider to review them with you.

## 2017-07-10 NOTE — LETTER
7/10/2017       RE: Deandre Alex  [address]  [place] [address]     Dear Colleague,    Thank you for referring your patient, Deandre Alex, to the [facility]. Please see a copy of my visit note below.    It was a pleasure seeing Mr. Alex today in neurosurgery clinic. He is a 46-year-old male with metastatic colorectal cancer. He returns today for routine follow-up. He continues on Avastin and lonsurf. He feels that his neurologic function in his right upper extremity has continued to decline over the last month or 2. He has had more seizures over the last month and he typically has in a month.    There were no vitals filed for this visit.  Data Unavailable    On examination he has worsening function of the right upper extremity particularly with fine coordination.    Imaging: His MRI continues to show slow progression of the metastatic tumor. There does appear to be some worsening edema around the tumor at this point. Imaging was reviewed with the patient turned the patient in clinic today.    Assessment: Metastatic colorectal cancer with progressive brain metastasis.    Plan: We discussed the continued decline of function that I expect to be inevitable. We discussed that he may continue to worsen. He will also likely worsen rapidly if the Avastin was withdrawn. Overall I think that he has a fairly guarded prognosis. He will continue to decide what he wants to do as far as his end-of-life decision making. He will likely forego his lonsurf this month. He did also inquire about palliative consultation.    Again, thank you for allowing me to participate in the care of your patient.      Sincerely,    Jewel Blanchard MD

## 2017-07-10 NOTE — NURSING NOTE
Chief Complaint   Patient presents with     Port Draw     rectal ca, labs collected via portacath.

## 2017-07-24 NOTE — NURSING NOTE
"Oncology Rooming Note    July 24, 2017 9:25 AM   Deandre Alex is a 46 year old male who presents for:    Chief Complaint   Patient presents with     Oncology Clinic Visit     Return visit related to Metastsis to brain     Initial Vitals: /89 (BP Location: Right arm, Patient Position: Sitting, Cuff Size: Adult Regular)  Pulse 75  Temp 97.5  F (36.4  C) (Oral)  Resp 16  Ht 1.803 m (5' 10.98\")  Wt 77 kg (169 lb 11.2 oz)  SpO2 98%  BMI 23.68 kg/m2 Estimated body mass index is 23.68 kg/(m^2) as calculated from the following:    Height as of this encounter: 1.803 m (5' 10.98\").    Weight as of this encounter: 77 kg (169 lb 11.2 oz). Body surface area is 1.96 meters squared.  No Pain (0) Comment: Data Unavailable   No LMP for male patient.  Allergies reviewed: Yes  Medications reviewed: Yes    Medications: MEDICATION REFILLS NEEDED TODAY. Provider was notified.  Pharmacy name entered into [facility]: [id number] - [facility] [address] [place] [address]    Clinical concerns: Refill needed Lorazeppam (Ativan) 0.5 Mg, lacosamide (Vimpat) 200 MG tabs, levetiracetam 1000 MG tqabs Provider was notified.    10 minutes for nursing intake (face to face time)     Deborah Hughes LPN            "

## 2017-07-24 NOTE — PROGRESS NOTES
Deandre Alxe is here today in follow-up of metastatic colorectal cancer.    Mr. Alex is been on active treatment with lonsurf. He also is receiving Avastin, primarily to control the edema related to his cerebral metastases. Since our last visit he notes he is had a little more trouble with the function of his right hand, but it still working well enough that he is able to manage his ostomy appliance. Last month he started having more seizures and increased his Ativan dose with return to his baseline of a few seizures per month. The weakness in his leg is stable and he's not developed any other new neurologic symptoms. He is still been able to work and he and his family were able to take a vacation which they enjoyed greatly.    On exam today Mr. Alex continues to appear surprisingly well. The strength and coordination in his right hand is clearly diminished from when I saw him last.    I reviewed his brain MR and body CT with the patient and his wife. He has minor progression in his brain and in some of his lung nodules. The volume of systemic disease is still quite small. His CEA level has risen dramatically from just above the normal range now 130.    Assessment/plan: Progressive metastatic colorectal cancer.     We had a very long discussion today about how to proceed. There are no other standard systemic treatment options for his disease, and the presence of active brain metastases makes it unlikely that he would qualify for any research studies. He also expressed that based on his very good understanding of all this, that he did not see much value to spending the time or travel necessary to get engaged in a phase 1 trial.     We spent a long time talking about the dying process and what he might expect and how quickly things could go. I explained the uncertainties involved. His biggest concern is if his neurologic function deteriorates enough that he can no longer manage his ostomy. He would like with his  current quality of life to continue to maintain some time but as it deteriorates he is going to prefer for things to go relatively quickly.     We worked out a plan today that we would continue the Avastin for his peritumoral edema, and his antiseizure medications, and other supportive medications. We won't be pursuing any further cancer directed therapies.    He's made a decision finally to go on disability and discontinue work so he can spend more time with his family. They had a lot of questions about discussing all of this with her teenage children. His children are well aware that he has cancer but they've not had a lot of discussions about the possibilities that he may die. I encouraged them to explore the [employer]'s program for children of parents with cancer, and they tell me they have several books they've been reading on the subject. They did not feel the need in the short-term for further help with that. We talked about what would be appropriate to enroll in hospice. While it certainly would be appropriate at this point that might mean discontinuing particularly the Avastin which probably is extending his life in the near term. He doesn't require a lot of other supportive services right now, and so they felt they would defer that decision for the time being.     We'll be seeing him back in about 2 months to reevaluate disease status to help give them some sense of the timeframe. We'll be in touch with us if they have more problems before that.    Total visit time today was greater than 1 hour all of it spent on the above discussion.

## 2017-07-24 NOTE — MR AVS SNAPSHOT
After Visit Summary   7/24/2017    Deandre Alex    MRN: [id number]           Patient Information     Date Of Birth          1970        Visit Information        Provider Department      7/24/2017 9:15 AM Willis Ramon MD [facility]        Today's Diagnoses     Rectal cancer (H)    -  1    Metastasis to brain (H)        Malignant neoplasm metastatic to lung, unspecified laterality (H)        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)           Follow-ups after your visit        Follow-up notes from your care team     Return in about 7 weeks (around 9/11/2017) for MD visit with CT and labs (11:15 OK).      Your next 10 appointments already scheduled     Aug 11, 2017  8:30 AM CDT   (Arrive by 8:15 AM)   Return Seizure with Erasto Richardson MD   [facility] Neurology ([facility])    [address] [place] [address] [id number] [phone]            Sep 11, 2017  7:45 AM CDT   (Arrive by 7:30 AM)   MR BRAIN W/O & W CONTRAST with [id number]   [facility] MRI ([facility])    [address] [place] [id number] [phone]           Take your medicines as usual, unless your doctor tells you not to. Bring a list of your current medicines to your exam (including vitamins, minerals and over-the-counter drugs).  You will be given intravenous contrast for this exam. To prepare:   The day before your exam, drink extra fluids at least six 8-ounce glasses (unless your doctor tells you to restrict your fluids).   Have a blood test (creatinine test) within 30 days of your exam. Go to your clinic or Diagnostic Imaging Department for this test.  The MRI machine uses a strong magnet. Please wear clothes without metal (snaps, zippers). A sweatsuit works well, or we may give you a hospital gown.  Please remove any body piercings and hair extensions before you arrive. You will also remove  watches, jewelry, hairpins, wallets, dentures, partial dental plates and hearing aids. You may wear contact lenses, and you may be able to wear your rings. We have a safe place to keep your personal items, but it is safer to leave them at home.   **IMPORTANT** THE INSTRUCTIONS BELOW ARE ONLY FOR THOSE PATIENTS WHO HAVE BEEN TOLD THEY WILL RECEIVE SEDATION OR GENERAL ANESTHESIA DURING THEIR MRI PROCEDURE:  IF YOU WILL RECEIVE SEDATION (take medicine to help you relax during your exam):   You must get the medicine from your doctor before you arrive. Bring the medicine to the exam. Do not take it at home.   Arrive one hour early. Bring someone who can take you home after the test. Your medicine will make you sleepy. After the exam, you may not drive, take a bus or take a taxi by yourself.   No eating 8 hours before your exam. You may have clear liquids up until 4 hours before your exam. (Clear liquids include water, clear tea, black coffee and fruit juice without pulp.)  IF YOU WILL RECEIVE ANESTHESIA (be asleep for your exam):   Arrive 1 1/2 hours early. Bring someone who can take you home after the test. You may not drive, take a bus or take a taxi by yourself.   No eating 8 hours before your exam. You may have clear liquids up until 4 hours before your exam. (Clear liquids include water, clear tea, black coffee and fruit juice without pulp.)  Please call the Imaging Department at your exam site with any questions.            Sep 11, 2017  8:40 AM CDT   (Arrive by 8:25 AM)   CT CHEST ABDOMEN PELVIS W/O & W CONTRAST with UCCT2   [facility] CT ([facility])    [address] [id number] [phone]           Please bring any scans or X-rays taken at other hospitals, if similar tests were done. Also bring a list of your medicines, including vitamins, minerals and over-the-counter drugs. It is safest to leave personal items at home.  Be sure to tell  your doctor:   If you have any allergies.   If there s any chance you are pregnant.   If you are breastfeeding.   If you have any special needs.  You may have contrast for this exam. To prepare:   Do not eat or drink for 2 hours before your exam. If you need to take medicine, you may take it with small sips of water. (We may ask you to take liquid medicine as well.)   The day before your exam, drink extra fluids at least six 8-ounce glasses (unless your doctor tells you to restrict your fluids).  Patients over 70 or patients with diabetes or kidney problems:   If you haven t had a blood test (creatinine test) within the last 30 days, go to your clinic or Diagnostic Imaging Department for this test.  If you have diabetes:   If your kidney function is normal, continue taking your metformin (Avandamet, Glucophage, Glucovance, Metaglip) on the day of your exam.   If your kidney function is abnormal, wait 48 hours before restarting this medicine.  You will have oral contrast for this exam:   You will drink the contrast at home. Get this from your clinic or Diagnostic Imaging Department. Please follow the directions given.  Please wear loose clothing, such as a sweat suit or jogging clothes. Avoid snaps, zippers and other metal. We may ask you to undress and put on a hospital gown.  If you have any questions, please call the Imaging Department where you will have your exam.            Sep 11, 2017  9:00 AM CDT   [facility] Lab Draw with  [facility] DRAW   [facility] Lab Draw ([facility])    [address] [place] [id number] [phone]            Sep 11, 2017 11:15 AM CDT   (Arrive by 11:00 AM)   Return Visit with Willis Ramon MD   [facility] ([facility])    [address] [place] [id number] [phone]              Future tests that were ordered for you today     Open Future Orders  "       Priority Expected Expires Ordered    CT Chest Abdomen Pelvis w/o & w Contrast Routine 9/11/2017 10/24/2017 7/24/2017    Comprehensive metabolic panel Routine 9/11/2017 10/24/2017 7/24/2017    CBC with platelets differential Routine 9/11/2017 10/24/2017 7/24/2017    CEA Routine 9/11/2017 10/24/2017 7/24/2017            Who to contact     If you have questions or need follow up information about today's clinic visit or your schedule please contact [facility] directly at [phone].  Normal or non-critical lab and imaging results will be communicated to you by Brain Paradehart, letter or phone within 4 business days after the clinic has received the results. If you do not hear from us within 7 days, please contact the clinic through FREEjitt or phone. If you have a critical or abnormal lab result, we will notify you by phone as soon as possible.  Submit refill requests through Parchment or call your pharmacy and they will forward the refill request to us. Please allow 3 business days for your refill to be completed.          Additional Information About Your Visit        MyChart Information     Parchment gives you secure access to your electronic health record. If you see a primary care provider, you can also send messages to your care team and make appointments. If you have questions, please call your primary care clinic.  If you do not have a primary care provider, please call [phone] and they will assist you.        Care EveryWhere ID     This is your Care EveryWhere ID. This could be used by other organizations to access your [place] medical records  [id number]        Your Vitals Were     Pulse Temperature Respirations Height Pulse Oximetry BMI (Body Mass Index)    75 97.5  F (36.4  C) (Oral) 16 1.803 m (5' 10.98\") 98% 23.68 kg/m2       Blood Pressure from Last 3 Encounters:   07/24/17 130/89   07/10/17 116/79   06/05/17 122/78    Weight from Last 3 Encounters:   07/24/17 77 kg (169 lb 11.2 " oz)   07/10/17 77.7 kg (171 lb 4.8 oz)   06/05/17 77.4 kg (170 lb 9.6 oz)                 Today's Medication Changes          These changes are accurate as of: 7/24/17 10:52 AM.  If you have any questions, ask your nurse or doctor.               Stop taking these medicines if you haven't already. Please contact your care team if you have questions.     Trifluridine-Tipiracil 15-6.14 MG tablet   Commonly known as:  LONSURF   Stopped by:  Willis Ramon MD           Trifluridine-Tipiracil 20-8.19 MG tablet   Commonly known as:  LONSURF   Stopped by:  Willis Ramon MD                    Primary Care Provider Office Phone # Fax #    Erasto Richardson [phone]        [facility]ANS [address] [place] [address] [address]  [facility] [address]        Equal Access to Services     [facility]: Hadii uriel moon hadasho Sokarine, waaxda luqadaha, qaybta kaalmada adeegyada, rtu foy . So [facility] [phone].    ATENCIÓN: Si habla español, tiene a kimball disposición servicios gratuitos de asistencia lingüística. Llame al [phone].    We comply with applicable federal civil rights laws and [place] laws. We do not discriminate on the basis of race, color, national origin, age, disability sex, sexual orientation or gender identity.            Thank you!     Thank you for choosing [facility]  for your care. Our goal is always to provide you with excellent care. Hearing back from our patients is one way we can continue to improve our services. Please take a few minutes to complete the written survey that you may receive in the mail after your visit with us. Thank you!             Your Updated Medication List - Protect others around you: Learn how to safely use, store and throw away your medicines at www.disposemymeds.org.          This list is accurate as of: 7/24/17 10:52 AM.  Always use your most recent med list.                   Brand Name Dispense Instructions  for use Diagnosis    lacosamide 200 MG Tabs tablet    VIMPAT    90 tablet    Take 1.5 tabs (300mg) by mouth twice daily    Localization-related focal epilepsy with simple partial seizures (H), Rectal cancer (H), Secondary malignant neoplasm of liver (H), Metastasis to brain (H)       levETIRAcetam 1000 MG Tabs     120 tablet    Take 2 tabs (2000mg) by mouth twice daily    Localization-related focal epilepsy with simple partial seizures (H), Rectal cancer (H), Secondary malignant neoplasm of liver (H), Metastasis to brain (H)       LORazepam 0.5 MG tablet    ATIVAN    60 tablet    2 tabs q 8 hrs as needed for seizures    Rectal cancer (H), Secondary malignant neoplasm of liver (H), Metastasis to brain (H), Localization-related focal epilepsy with simple partial seizures (H)       meclizine 25 MG tablet    ANTIVERT     Take 25 mg by mouth as needed        ondansetron 8 MG ODT tab    ZOFRAN-ODT    30 tablet    Take 1 tablet (8 mg) by mouth every 8 hours as needed for nausea    Rectal cancer (H), Secondary malignant neoplasm of liver (H), Metastasis to brain (H), Malignant neoplasm metastatic to lung, unspecified laterality (H)

## 2017-07-24 NOTE — LETTER
7/24/2017      RE: Deandre Alex  [address]  [place] [address]       Deandre Alex is here today in follow-up of metastatic colorectal cancer.    Mr. Alex is been on active treatment with lawn surface. He also is receiving Avastin, primarily to control the edema related to his cerebral metastases. Since our last visit he notes he is had a little more trouble with the function of his right hand, but it still working well enough that he is able to manage his ostomy appliance. Last month he started having more seizures and increased his Ativan dose with return to his baseline of a few seizures per month. The weakness in his leg is stable and he's not developed any other new neurologic symptoms. He is still been able to work and he and his family were able to take a vacation which they enjoyed greatly.    On exam today Mr. Alex continues to appear surprisingly well. The strength and coordination in his right hand is clearly diminished from when I saw him last.    I reviewed his brain MR and body CT with the patient and his wife. He has minor progression in his brain and in some of his lung nodules. The volume of systemic disease is still quite small. His CEA level has risen dramatically from just above the normal range now 130.    Assessment/plan: Progressive metastatic colorectal cancer.     We had a very long discussion today about how to proceed. There are no other standard systemic treatment options for his disease, and the presence of active brain metastases makes it unlikely that he would qualify for any research studies. He also expressed that based on his very good understanding of all this, that he did not see much value to spending the time her travel necessary to get engaged in a phase 1 trial.     We spent a long time talking about the dying process and what he might expect and how quickly things could go. I explained the uncertainties involved. His biggest concern is if his neurologic function  deteriorates enough that he can no longer manage his ostomy. He would like with his current quality of life to continue to maintain some time but as it deteriorates he is going to prefer for things to go relatively quickly.     We worked out a plan today that we would continue the Avastin for his peritumoral edema, and his antiseizure medications, and other supportive medications. We won't be pursuing any further cancer directed therapies.    He's made a decision finally to go on disability and discontinue work so he can spend more time with his family. They had a lot of questions about discussing all of this with her teenage children. His children are well aware that he has cancer but they've not had a lot of discussions about the possibilities that he may die. I encouraged them to explore the [employer]'s program for children of parents with cancer, and they tell me they have several books they've been reading on the subject. They did not feel the need in the short-term for further help with that. We talked about what would be appropriate to enroll in hospice. While it certainly would be appropriate at this point that might mean discontinuing particularly the Avastin which probably is extending his life in the near term. He doesn't require a lot of other supportive services right now, and so they felt they would defer that decision for the time being.     We'll be seeing him back in about 2 months to reevaluate disease status to help give them some sense of the timeframe. We'll be in touch with us if they have more problems before that.    Total visit time today was greater than 1 hour all of it spent on the above discussion.    Willis Ramon MD

## 2017-08-01 NOTE — PROGRESS NOTES
ORAL CHEMOTHERAPY DISCONTINUATION       Primary Oncologist:  Dr. Ramon  Primary Oncology Clinic: [facility]  Cancer Diagnosis:  Rectal   Therapy History:  Start Date: 1/09/2017  Lonsurf 70mg BID on days 1-5 and 6-12 of 28 day cycle.  Held on 4/10/2017 and resumed 4/17/2017  Held on 6/05/2017 and resumed 6/16/2017  Therapy Ended On:  7/24/2017  Reason For Discontinuation: disease progression    Additional Notes:    Thank you for the opportunity to be a part in the care of this patient's oral chemotherapy. The oncology pharmacy will no longer be following this patient for oral chemotherapy. If there are any questions or the plan changes, feel free to contact us.    Tera Cole  Pharmacy Intern  [facility]  [phone]

## 2017-08-10 NOTE — PATIENT INSTRUCTIONS
Contact Numbers    [facility] Main Line: [phone]  [facility] Triage:  [phone]    Call triage with chills and/or temperature greater than or equal to 100.5, uncontrolled nausea/vomiting, diarrhea, constipation, dizziness, shortness of breath, chest pain, bleeding, unexplained bruising, or any new/concerning symptoms, questions/concerns.     If you are having any concerning symptoms or wish to speak to a provider before your next infusion visit, please call your care coordinator or triage to notify them so we can adequately serve you.       After Hours: [phone]    If after hours, weekends, or holidays, call main hospital  and ask for Oncology doctor on call.           August 2017 Sunday Monday Tuesday Wednesday Thursday Friday Saturday             1     2     3     4     5       6     7     8     9     10     [facility] DRAW    4:00 PM   (15 min.)    [facility] DRAW   [facility] Lab Draw     [facility] ONC INFUSION 60    4:30 PM   (60 min.)    ONCOLOGY INFUSION   [facility] 11     [facility] RETURN SEIZURE    8:15 AM   (30 min.)   Erasto Richardson MD   [facility] Neurology 12       13     14     15     16     17     18     19       20     21  Happy Birthday!     22     23     24     25     26       27     28     29     30     31 September 2017 Sunday Monday Tuesday Wednesday Thursday Friday Saturday                            1     2       3     4     5     6     7     8     9       10     11     MR BRAIN WWO    7:30 AM   (45 min.)   [id number]   [facility] MRI     CT CHEST ABDOMEN PELVIS WWO    8:25 AM   (20 min.)   UCCT2   [facility] CT     [facility] DRAW    9:00 AM   (15 min.)    [facility] DRAW   [facility] Lab Draw     [facility] RETURN   11:00 AM   (30 min.)   Willis Ramon MD   [facility]     [facility] ONC INFUSION 60   12:00 PM   (60 min.)    ONCOLOGY INFUSION   [facility]      [facility] RETURN    2:30 PM   (15 min.)   Jewel Blanchard MD   [facility] Cancer [facility] 12     13     14     15     16       17     18     19     20     21     22     23       24     25     26     27     28     29     30                 Recent Results (from the past 24 hour(s))   Comprehensive metabolic panel    Collection Time: 08/10/17  3:32 PM   Result Value Ref Range    Sodium 136 133 - 144 mmol/L    Potassium 3.8 3.4 - 5.3 mmol/L    Chloride 102 94 - 109 mmol/L    Carbon Dioxide 26 20 - 32 mmol/L    Anion Gap 8 3 - 14 mmol/L    Glucose 92 70 - 99 mg/dL    Urea Nitrogen 17 7 - 30 mg/dL    Creatinine 1.00 0.66 - 1.25 mg/dL    GFR Estimate 80 >60 mL/min/1.7m2    GFR Estimate If Black >90   GFR Calc   >60 mL/min/1.7m2    Calcium 8.6 8.5 - 10.1 mg/dL    Bilirubin Total 0.7 0.2 - 1.3 mg/dL    Albumin 3.7 3.4 - 5.0 g/dL    Protein Total 7.1 6.8 - 8.8 g/dL    Alkaline Phosphatase 100 40 - 150 U/L    ALT 20 0 - 70 U/L    AST 24 0 - 45 U/L   *CBC with platelets differential    Collection Time: 08/10/17  3:32 PM   Result Value Ref Range    WBC 8.2 4.0 - 11.0 10e9/L    RBC Count 4.06 (L) 4.4 - 5.9 10e12/L    Hemoglobin 14.2 13.3 - 17.7 g/dL    Hematocrit 41.2 40.0 - 53.0 %     (H) 78 - 100 fl    MCH 35.0 (H) 26.5 - 33.0 pg    MCHC 34.5 31.5 - 36.5 g/dL    RDW 12.6 10.0 - 15.0 %    Platelet Count 142 (L) 150 - 450 10e9/L    Diff Method Automated Method     % Neutrophils 79.3 %    % Lymphocytes 8.4 %    % Monocytes 6.1 %    % Eosinophils 5.3 %    % Basophils 0.5 %    % Immature Granulocytes 0.4 %    Nucleated RBCs 0 0 /100    Absolute Neutrophil 6.5 1.6 - 8.3 10e9/L    Absolute Lymphocytes 0.7 (L) 0.8 - 5.3 10e9/L    Absolute Monocytes 0.5 0.0 - 1.3 10e9/L    Absolute Eosinophils 0.4 0.0 - 0.7 10e9/L    Absolute Basophils 0.0 0.0 - 0.2 10e9/L    Abs Immature Granulocytes 0.0 0 - 0.4 10e9/L    Absolute Nucleated RBC 0.0    Protein qualitative urine    Collection Time: 08/10/17  3:52 PM   Result  Value Ref Range    Protein Albumin Urine 10 (A) NEG mg/dL

## 2017-08-10 NOTE — PROGRESS NOTES
Infusion Nursing Note:  Deandre Alex presents today for cycle 1, day 1 avastin (patient has had before).    Patient seen by provider today: No   present during visit today: Not Applicable.    Note: Patient reports increasing numbness/loss of function in his right arm and increased seizure activity.  This was discussed with Dr Ramon at last visit and patient is seeing his neurologist tomorrow.    Avastin is ordered on his new treatment plan every 2 weeks.  In the past he has received it monthly and wasn't aware that the frequency was changing.  Dr Ramon and care coordinator Ayah Nugent messaged to clarify the frequency of patient's avastin infusions and set him up in 2 weeks if needed    Intravenous Access:  Implanted Port.    Treatment Conditions:  Lab Results   Component Value Date    HGB 14.2 08/10/2017     Lab Results   Component Value Date    WBC 8.2 08/10/2017      Lab Results   Component Value Date    ANEU 6.5 08/10/2017     Lab Results   Component Value Date     08/10/2017      Lab Results   Component Value Date     08/10/2017                   Lab Results   Component Value Date    POTASSIUM 3.8 08/10/2017           Lab Results   Component Value Date    MAG 2.2 03/04/2015            Lab Results   Component Value Date    CR 1.00 08/10/2017                   Lab Results   Component Value Date    FAYE 8.6 08/10/2017                Lab Results   Component Value Date    BILITOTAL 0.7 08/10/2017           Lab Results   Component Value Date    ALBUMIN 3.7 08/10/2017                    Lab Results   Component Value Date    ALT 20 08/10/2017           Lab Results   Component Value Date    AST 24 08/10/2017     Results reviewed, labs MET treatment parameters, ok to proceed with treatment.  Urine for protein: 10mg/dL.  /83.      Post Infusion Assessment:  Patient tolerated infusion without incident.  Blood return noted pre and post infusion.  Site patent and intact, free from redness,  edema or discomfort.  No evidence of extravasations.  Access discontinued per protocol.    Discharge Plan:   Prescription refills given for ativan.  Discharge instructions reviewed with: Patient.  Patient and/or family verbalized understanding of discharge instructions and all questions answered.  Copy of AVS reviewed with patient and/or family.  Patient will return 9/11 for next infusion.  Argy to schedule if needed in 2 weeks (see note above)  Patient discharged in stable condition accompanied by: self.  Departure Mode: Ambulatory.  Face to Face: 2 minutes     Hyun Madrigal RN

## 2017-08-10 NOTE — MR AVS SNAPSHOT
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)        Metastasis to brain (H)        Localization-related focal epilepsy with simple partial seizures (H)        Malignant neoplasm metastatic to lung, unspecified laterality (H)          Care Instructions    Contact Numbers    [facility] Main Line: [phone]  [facility] Triage:  [phone]    Call triage with chills and/or temperature greater than or equal to 100.5, uncontrolled nausea/vomiting, diarrhea, constipation, dizziness, shortness of breath, chest pain, bleeding, unexplained bruising, or any new/concerning symptoms, questions/concerns.     If you are having any concerning symptoms or wish to speak to a provider before your next infusion visit, please call your care coordinator or triage to notify them so we can adequately serve you.       After Hours: [phone]    If after hours, weekends, or holidays, call main hospital  and ask for Oncology doctor on call.           August 2017 Sunday Monday Tuesday Wednesday Thursday Friday Saturday             1     2     3     4     5       6     7     8     9     10     [facility] LAB DRAW    4:00 PM   (15 min.)    [facility] DRAW   [facility] Lab Draw     [facility] ONC INFUSION 60    4:30 PM   (60 min.)    ONCOLOGY INFUSION   [facility] Cancer [facility] 11     [facility] RETURN SEIZURE    8:15 AM   (30 min.)   Erasto Richardson MD   [facility] Neurology 12       13     14     15     16     17     18     19       20     21  Happy Birthday!     22     23     24     25     26       27     28     29     30     31                           September 2017 Sunday Monday Tuesday Wednesday Thursday Friday Saturday                            1     2       3     4     5     6     7     8     9       10     11     MR BRAIN WWO    7:30 AM   (45 min.)   [id number]   [facility] MRI     CT CHEST ABDOMEN PELVIS WWO    8:25 AM   (20 min.)   UCCT2   [facility] CT     [facility] DRAW    9:00 AM   (15 min.)   [facility]   [facility] Lab Draw     [facility] RETURN   11:00 AM   (30 min.)   Wlilis Ramon MD   [facility] ONC INFUSION 60   12:00 PM   (60 min.)    ONCOLOGY INFUSION   [facility] RETURN    2:30 PM   (15 min.)   Jewel Blanchard MD   [facility] 12     13     14     15     16       17     18     19     20     21     22     23       24     25     26     27     28     29     30                 Recent Results (from the past 24 hour(s))   Comprehensive metabolic panel    Collection Time: 08/10/17  3:32 PM   Result Value Ref Range    Sodium 136 133 - 144 mmol/L    Potassium 3.8 3.4 - 5.3 mmol/L    Chloride 102 94 - 109 mmol/L    Carbon Dioxide 26 20 - 32 mmol/L    Anion Gap 8 3 - 14 mmol/L    Glucose 92 70 - 99 mg/dL    Urea Nitrogen 17 7 - 30 mg/dL    Creatinine 1.00 0.66 - 1.25 mg/dL    GFR Estimate 80 >60 mL/min/1.7m2    GFR Estimate If Black >90   GFR Calc   >60 mL/min/1.7m2    Calcium 8.6 8.5 - 10.1 mg/dL    Bilirubin Total 0.7 0.2 - 1.3 mg/dL    Albumin 3.7 3.4 - 5.0 g/dL    Protein Total 7.1 6.8 - 8.8 g/dL    Alkaline Phosphatase 100 40 - 150 U/L    ALT 20 0 - 70 U/L    AST 24 0 - 45 U/L   *CBC with platelets differential    Collection Time: 08/10/17  3:32 PM   Result Value Ref Range    WBC 8.2 4.0 - 11.0 10e9/L    RBC Count 4.06 (L) 4.4 - 5.9 10e12/L    Hemoglobin 14.2 13.3 - 17.7 g/dL    Hematocrit 41.2 40.0 - 53.0 %     (H) 78 - 100 fl    MCH 35.0 (H) 26.5 - 33.0 pg    MCHC 34.5 31.5 - 36.5  g/dL    RDW 12.6 10.0 - 15.0 %    Platelet Count 142 (L) 150 - 450 10e9/L    Diff Method Automated Method     % Neutrophils 79.3 %    % Lymphocytes 8.4 %    % Monocytes 6.1 %    % Eosinophils 5.3 %    % Basophils 0.5 %    % Immature Granulocytes 0.4 %    Nucleated RBCs 0 0 /100    Absolute Neutrophil 6.5 1.6 - 8.3 10e9/L    Absolute Lymphocytes 0.7 (L) 0.8 - 5.3 10e9/L    Absolute Monocytes 0.5 0.0 - 1.3 10e9/L    Absolute Eosinophils 0.4 0.0 - 0.7 10e9/L    Absolute Basophils 0.0 0.0 - 0.2 10e9/L    Abs Immature Granulocytes 0.0 0 - 0.4 10e9/L    Absolute Nucleated RBC 0.0    Protein qualitative urine    Collection Time: 08/10/17  3:52 PM   Result Value Ref Range    Protein Albumin Urine 10 (A) NEG mg/dL                 Follow-ups after your visit        Your next 10 appointments already scheduled     Aug 11, 2017  8:30 AM CDT   (Arrive by 8:15 AM)   Return Seizure with Erasto Richardson MD   [facility] Neurology ([facility])    [address] [place] [address] [phone]            Sep 11, 2017  7:45 AM CDT   (Arrive by 7:30 AM)   MR BRAIN W/O & W CONTRAST with [id number]   [facility] MRI ([facility])    [address] [place] [id number] [phone]           Take your medicines as usual, unless your doctor tells you not to. Bring a list of your current medicines to your exam (including vitamins, minerals and over-the-counter drugs).  You will be given intravenous contrast for this exam. To prepare:   The day before your exam, drink extra fluids at least six 8-ounce glasses (unless your doctor tells you to restrict your fluids).   Have a blood test (creatinine test) within 30 days of your exam. Go to your clinic or Diagnostic Imaging Department for this test.  The MRI machine uses a strong magnet. Please wear clothes without metal (snaps, zippers). A sweatsuit works well, or we may give you a hospital  gown.  Please remove any body piercings and hair extensions before you arrive. You will also remove watches, jewelry, hairpins, wallets, dentures, partial dental plates and hearing aids. You may wear contact lenses, and you may be able to wear your rings. We have a safe place to keep your personal items, but it is safer to leave them at home.   **IMPORTANT** THE INSTRUCTIONS BELOW ARE ONLY FOR THOSE PATIENTS WHO HAVE BEEN TOLD THEY WILL RECEIVE SEDATION OR GENERAL ANESTHESIA DURING THEIR MRI PROCEDURE:  IF YOU WILL RECEIVE SEDATION (take medicine to help you relax during your exam):   You must get the medicine from your doctor before you arrive. Bring the medicine to the exam. Do not take it at home.   Arrive one hour early. Bring someone who can take you home after the test. Your medicine will make you sleepy. After the exam, you may not drive, take a bus or take a taxi by yourself.   No eating 8 hours before your exam. You may have clear liquids up until 4 hours before your exam. (Clear liquids include water, clear tea, black coffee and fruit juice without pulp.)  IF YOU WILL RECEIVE ANESTHESIA (be asleep for your exam):   Arrive 1 1/2 hours early. Bring someone who can take you home after the test. You may not drive, take a bus or take a taxi by yourself.   No eating 8 hours before your exam. You may have clear liquids up until 4 hours before your exam. (Clear liquids include water, clear tea, black coffee and fruit juice without pulp.)  Please call the Imaging Department at your exam site with any questions.            Sep 11, 2017  8:40 AM CDT   (Arrive by 8:25 AM)   CT CHEST ABDOMEN PELVIS W/O & W CONTRAST with UCCT2   [facility] CT ([facility])    [address]  1st Floor  [facility] [address] [phone]           Please bring any scans or X-rays taken at other hospitals, if similar tests were done. Also bring a list of your medicines, including vitamins,  minerals and over-the-counter drugs. It is safest to leave personal items at home.  Be sure to tell your doctor:   If you have any allergies.   If there s any chance you are pregnant.   If you are breastfeeding.   If you have any special needs.  You may have contrast for this exam. To prepare:   Do not eat or drink for 2 hours before your exam. If you need to take medicine, you may take it with small sips of water. (We may ask you to take liquid medicine as well.)   The day before your exam, drink extra fluids at least six 8-ounce glasses (unless your doctor tells you to restrict your fluids).  Patients over 70 or patients with diabetes or kidney problems:   If you haven t had a blood test (creatinine test) within the last 30 days, go to your clinic or Diagnostic Imaging Department for this test.  If you have diabetes:   If your kidney function is normal, continue taking your metformin (Avandamet, Glucophage, Glucovance, Metaglip) on the day of your exam.   If your kidney function is abnormal, wait 48 hours before restarting this medicine.  You will have oral contrast for this exam:   You will drink the contrast at home. Get this from your clinic or Diagnostic Imaging Department. Please follow the directions given.  Please wear loose clothing, such as a sweat suit or jogging clothes. Avoid snaps, zippers and other metal. We may ask you to undress and put on a hospital gown.  If you have any questions, please call the Imaging Department where you will have your exam.            Sep 11, 2017  9:00 AM CDT   [facility] Lab Draw with  [facility] DRAW   [facility] Lab Draw ([facility])    [address] [place] [id number] [phone]            Sep 11, 2017 11:15 AM CDT   (Arrive by 11:00 AM)   Return Visit with Willis Ramon MD   [facility] Clinic ([facility])    [address] [place] [address]  [id number] [phone]            Sep 11, 2017 12:00 PM CDT   Infusion 60 with UC ONCOLOGY INFUSION, UC 15 ATC   [facility] ([facility])    [address] [place] [address] [phone]            Sep 11, 2017  2:45 PM CDT   (Arrive by 2:30 PM)   Return Visit with Jewel Blanchard MD   [facility] ([facility])    [address] [place] [address] [phone]              Who to contact     If you have questions or need follow up information about today's clinic visit or your schedule please contact [facility] CANCER [facility] directly at [phone].  Normal or non-critical lab and imaging results will be communicated to you by Thryvehart, letter or phone within 4 business days after the clinic has received the results. If you do not hear from us within 7 days, please contact the clinic through Thryvehart or phone. If you have a critical or abnormal lab result, we will notify you by phone as soon as possible.  Submit refill requests through Hubs1 or call your pharmacy and they will forward the refill request to us. Please allow 3 business days for your refill to be completed.          Additional Information About Your Visit        Thryvehart Information     Hubs1 gives you secure access to your electronic health record. If you see a primary care provider, you can also send messages to your care team and make appointments. If you have questions, please call your primary care clinic.  If you do not have a primary care provider, please call [phone] and they will assist you.        Care EveryWhere ID     This is your Care EveryWhere ID. This could be used by other organizations to access your [place] medical records  [id number]        Your Vitals Were     Pulse Temperature Pulse Oximetry BMI (Body Mass Index)          67 97.5  F (36.4  C) (Oral) 97% 24 kg/m2          Blood Pressure from Last 3 Encounters:   08/10/17 126/83   07/24/17 130/89   07/10/17 116/79    Weight from Last 3 Encounters:   08/10/17 78 kg (172 lb)   07/24/17 77 kg (169 lb 11.2 oz)   07/10/17 77.7 kg (171 lb 4.8 oz)              We Performed the Following     *CBC with platelets differential     Comprehensive metabolic panel     Protein qualitative urine          Where to get your medicines      Some of these will need a paper prescription and others can be bought over the counter.  Ask your nurse if you have questions.     Bring a paper prescription for each of these medications     LORazepam 0.5 MG tablet          Primary Care Provider Office Phone # Fax #    Erasto Richardson [phone]       [address] [place] [address]        Equal Access to Services     JOY BECKFORD : Alfred Garcia, waaxgerda luqadaha, qaybta kaalmada buck, tru foy . So [facility] [phone].    ATENCIÓN: Si habla español, tiene a kimball disposición servicios gratuitos de asistencia lingüística. Ervin al [phone].    We comply with applicable federal civil rights laws and [place] laws. We do not discriminate on the basis of race, color, national origin, age, disability sex, sexual orientation or gender identity.            Thank you!     Thank you for choosing [facility]  for your care. Our goal is always to provide you with excellent care. Hearing back from our patients is one way we can continue to improve our services. Please take a few minutes to complete the written survey that you may receive in the mail after your visit with us. Thank you!             Your Updated Medication List - Protect others around you: Learn how to safely use, store and throw away your medicines at www.disposemymeds.org.          This list is accurate as of: 8/10/17  4:38 PM.  Always use your most recent med list.                   Brand Name Dispense  Instructions for use Diagnosis    lacosamide 200 MG Tabs tablet    VIMPAT    90 tablet    Take 1.5 tabs (300mg) by mouth twice daily    Localization-related focal epilepsy with simple partial seizures (H), Rectal cancer (H), Secondary malignant neoplasm of liver (H), Metastasis to brain (H)       levETIRAcetam 1000 MG Tabs     120 tablet    Take 2 tabs (2000mg) by mouth twice daily    Localization-related focal epilepsy with simple partial seizures (H), Rectal cancer (H), Secondary malignant neoplasm of liver (H), Metastasis to brain (H)       LORazepam 0.5 MG tablet    ATIVAN    60 tablet    2 tabs q 8 hrs as needed for seizures    Rectal cancer (H), Secondary malignant neoplasm of liver (H), Metastasis to brain (H), Localization-related focal epilepsy with simple partial seizures (H)       meclizine 25 MG tablet    ANTIVERT     Take 25 mg by mouth as needed        ondansetron 8 MG ODT tab    ZOFRAN-ODT    30 tablet    Take 1 tablet (8 mg) by mouth every 8 hours as needed for nausea    Rectal cancer (H), Secondary malignant neoplasm of liver (H), Metastasis to brain (H), Malignant neoplasm metastatic to lung, unspecified laterality (H)

## 2017-08-11 NOTE — MR AVS SNAPSHOT
After Visit Summary   8/11/2017    Deandre Alex    MRN: [id number]           Patient Information     Date Of Birth          1970        Visit Information        Provider Department      8/11/2017 8:30 AM Erasto Richardson MD [facility] Neurology        Today's Diagnoses     Seizure disorder, focal motor (H)    -  1       Follow-ups after your visit        Follow-up notes from your care team     Return in about 3 months (around 11/11/2017).      Your next 10 appointments already scheduled     Sep 11, 2017  7:45 AM CDT   (Arrive by 7:30 AM)   MR BRAIN W/O & W CONTRAST with CHYM1H8   [facility] [place] [facility] ([facility] [place])    [address] [address] [phone]           Take your medicines as usual, unless your doctor tells you not to. Bring a list of your current medicines to your exam (including vitamins, minerals and over-the-counter drugs).  You will be given intravenous contrast for this exam. To prepare:   The day before your exam, drink extra fluids at least six 8-ounce glasses (unless your doctor tells you to restrict your fluids).   Have a blood test (creatinine test) within 30 days of your exam. Go to your clinic or Diagnostic Imaging Department for this test.  The MRI machine uses a strong magnet. Please wear clothes without metal (snaps, zippers). A sweatsuit works well, or we may give you a hospital gown.  Please remove any body piercings and hair extensions before you arrive. You will also remove watches, jewelry, hairpins, wallets, dentures, partial dental plates and hearing aids. You may wear contact lenses, and you may be able to wear your rings. We have a safe place to keep your personal items, but it is safer to leave them at home.   **IMPORTANT** THE INSTRUCTIONS BELOW ARE ONLY FOR THOSE PATIENTS WHO HAVE BEEN TOLD THEY WILL RECEIVE SEDATION OR GENERAL ANESTHESIA DURING THEIR MRI PROCEDURE:  IF YOU WILL RECEIVE SEDATION  (take medicine to help you relax during your exam):   You must get the medicine from your doctor before you arrive. Bring the medicine to the exam. Do not take it at home.   Arrive one hour early. Bring someone who can take you home after the test. Your medicine will make you sleepy. After the exam, you may not drive, take a bus or take a taxi by yourself.   No eating 8 hours before your exam. You may have clear liquids up until 4 hours before your exam. (Clear liquids include water, clear tea, black coffee and fruit juice without pulp.)  IF YOU WILL RECEIVE ANESTHESIA (be asleep for your exam):   Arrive 1 1/2 hours early. Bring someone who can take you home after the test. You may not drive, take a bus or take a taxi by yourself.   No eating 8 hours before your exam. You may have clear liquids up until 4 hours before your exam. (Clear liquids include water, clear tea, black coffee and fruit juice without pulp.)  Please call the Imaging Department at your exam site with any questions.            Sep 11, 2017  8:40 AM CDT   (Arrive by 8:25 AM)   CT CHEST ABDOMEN PELVIS W/O & W CONTRAST with UCCT2   [facility] ([facility])    [address] [address] [phone]           Please bring any scans or X-rays taken at other hospitals, if similar tests were done. Also bring a list of your medicines, including vitamins, minerals and over-the-counter drugs. It is safest to leave personal items at home.  Be sure to tell your doctor:   If you have any allergies.   If there s any chance you are pregnant.   If you are breastfeeding.   If you have any special needs.  You may have contrast for this exam. To prepare:   Do not eat or drink for 2 hours before your exam. If you need to take medicine, you may take it with small sips of water. (We may ask you to take liquid medicine as well.)   The day before your exam, drink extra fluids at least six 8-ounce  glasses (unless your doctor tells you to restrict your fluids).  Patients over 70 or patients with diabetes or kidney problems:   If you haven t had a blood test (creatinine test) within the last 30 days, go to your clinic or Diagnostic Imaging Department for this test.  If you have diabetes:   If your kidney function is normal, continue taking your metformin (Avandamet, Glucophage, Glucovance, Metaglip) on the day of your exam.   If your kidney function is abnormal, wait 48 hours before restarting this medicine.  You will have oral contrast for this exam:   You will drink the contrast at home. Get this from your clinic or Diagnostic Imaging Department. Please follow the directions given.  Please wear loose clothing, such as a sweat suit or jogging clothes. Avoid snaps, zippers and other metal. We may ask you to undress and put on a hospital gown.  If you have any questions, please call the Imaging Department where you will have your exam.            Sep 11, 2017  9:00 AM CDT   [facility]   [facility] Lab Draw ([facility])    [address] [place] [address]   [phone]            Sep 11, 2017 11:15 AM CDT   (Arrive by 11:00 AM)   Return Visit with Willis Ramon MD   [facility])    [address] [place] [address]   [phone]            Sep 11, 2017 12:00 PM CDT   Infusion 60 with  ONCOLOGY INFUSION, UC 15 ATC   [facility])    [address] [place] [address]   [phone]            Sep 11, 2017  2:45 PM CDT   (Arrive by 2:30 PM)   Return Visit with Jewel Blanchard MD   [facility] ([facility])    [address] [place] [address]   [phone]              Who to contact      "Please call your clinic at [phone] to:    Ask questions about your health    Make or cancel appointments    Discuss your medicines    Learn about your test results    Speak to your doctor   If you have compliments or concerns about an experience at your clinic, or if you wish to file a complaint, please contact [facility] Patient Relations at [phone] or email us at [email or web address]         Additional Information About Your Visit        flux - neutrinityhart Information     Draftstreett gives you secure access to your electronic health record. If you see a primary care provider, you can also send messages to your care team and make appointments. If you have questions, please call your primary care clinic.  If you do not have a primary care provider, please call [phone] and they will assist you.      YouView is an electronic gateway that provides easy, online access to your medical records. With YouView, you can request a clinic appointment, read your test results, renew a prescription or communicate with your care team.     To access your existing account, please contact your [facility] or call [phone] for assistance.        Care EveryWhere ID     This is your Care EveryWhere ID. This could be used by other organizations to access your [place] medical records  [id number]        Your Vitals Were     Pulse Height BMI (Body Mass Index)             79 1.778 m (5' 10\") 24.68 kg/m2          Blood Pressure from Last 3 Encounters:   08/11/17 119/80   08/10/17 126/83   07/24/17 130/89    Weight from Last 3 Encounters:   08/11/17 78 kg (172 lb)   08/10/17 78 kg (172 lb)   07/24/17 77 kg (169 lb 11.2 oz)                 Today's Medication Changes          These changes are accurate as of: 8/11/17 11:59 PM.  If you have any questions, ask your nurse or doctor.               Start taking these medicines.        Dose/Directions    PHENobarbital 60 MG " tablet   Commonly known as:  LUMINAL   Used for:  Seizure disorder, focal motor (H)   Started by:  Erasto Richardson MD        1 tab at night   Quantity:  60 tablet   Refills:  5            Where to get your medicines      Some of these will need a paper prescription and others can be bought over the counter.  Ask your nurse if you have questions.     Bring a paper prescription for each of these medications     PHENobarbital 60 MG tablet                Primary Care Provider Office Phone # Fax #    Erasto Richardson [phone]       [address] [place] [address]        Equal Access to Services     [facility]: Hadii aad ku hadasho Soomaali, waaxda luqadaha, qaybta kaalmada adeegyada, waxmichael foy . So [facility] [phone].    ATENCIÓN: Si habla español, tiene a kimball disposición servicios gratuitos de asistencia lingüística. [facility] [phone].    We comply with applicable federal civil rights laws and [place] laws. We do not discriminate on the basis of race, color, national origin, age, disability sex, sexual orientation or gender identity.            Thank you!     Thank you for choosing [facility] NEUROLOGY  for your care. Our goal is always to provide you with excellent care. Hearing back from our patients is one way we can continue to improve our services. Please take a few minutes to complete the written survey that you may receive in the mail after your visit with us. Thank you!             Your Updated Medication List - Protect others around you: Learn how to safely use, store and throw away your medicines at www.disposemymeds.org.          This list is accurate as of: 8/11/17 11:59 PM.  Always use your most recent med list.                   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)       LORazepam 0.5 MG tablet    ATIVAN    60 tablet    2 tabs q 8 hrs as needed for seizures    Rectal cancer (H), Secondary malignant neoplasm of liver (H), 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

## 2017-08-11 NOTE — LETTER
8/11/2017       RE: Deandre Alex  [address]  [facility] [place] [address]     Dear Colleague,    Thank you for referring your patient, Deandre Alex, to the [facility] NEUROLOGY at [facility]. Please see a copy of my visit note below.    SUBJECTIVE:  Mr. Deandre Alex is 46 years old with a large metastases on the left hemisphere and near the motor cortex for which he has had 2 surgical procedures.  This is metastatic adenocarcinoma.  His seizures were carrying a frequency of 3-4 per month and there were focal motor types and they continue to be focal motor in the arm and occasionally in the leg but now they are occurring 10-12 times a month over the last couple of months.  He has been on Levetiracetam on a very good dose/level relationship, 2000 twice a day, and he has also been on a locked lacosamide (Vimpat) 200 twice a day.  Both levels have been very high and good, but he continues to have seizures.      The lesion in the left hemisphere has apparently no further treatment options left.  He is on Avastin.  He was told he has 6 months to live.  He does have 1 metastases to the lung and apparently very few in the liver.      Currently, he is a little bit despondent, although accepting.  He does want to burden his family.        OBJECTIVE:  On his exam, he shows blood pressure 119/80, pulse 79, weight is 172 pounds.  He does have pretty severe weakness of the hand, proximally is okay.  The leg has a brace and that there is not much proximal weakness.      ASSESSMENT AND PLAN:  In summary, crescendo focal motor or partial seizures due to increase of the left hemisphere tumor.  I think phenobarbital with a slow escalation program will work better as therapy so he will be started on 60 mg at bedtime and a level in 2 weeks of all 3 drugs and then we will probably increase it.  He is using lorazepam for anxiety as well as seizure control.  He is encouraged to seek a second opinion in  [place] for management of the tumor and we will be trying to help him through Dr. Simpson there.           D: 2017 10:20   T: 2017 11:35   MT: tb      Name:     TREE HOLM   MRN:      [id number]        Account:      [id number]   :      1970           Service Date: 2017      Document: [id number]       Again, thank you for allowing me to participate in the care of your patient.      Sincerely,    Erasto Richardson MD

## 2017-08-11 NOTE — TELEPHONE ENCOUNTER
Primary received following message;    Caller: Deandre     Relationship to Patient: self     Call Back Number: [phone]     Reason for Call: pt would like to know if he should still be taking keppra and vimpat. Phenobarbital was recently added.     Spoke with Dr. Richardson, and Dr. Richardson confirms that patient should remain on all 3 meds. Called patient back and left detailed voice message with call back number and name.

## 2017-08-23 NOTE — TELEPHONE ENCOUNTER
Prior Authorization Retail Medication Request  Medication/Dose: Lacosamide 300 mg  Diagnosis and ICD code:   Seizure disorder, focal motor (H)  - Primary G40.109       New/Renewal/Insurance Change PA: New  Previously Tried and Failed Therapies: See chart    Insurance ID (if provided):   Insurance Phone (if provided):     Any additional info from fax request:     If you received a fax notification from an outside Pharmacy:  Pharmacy Name:[facility]  Pharmacy #:[phone]  Pharmacy Fax:[phone]

## 2017-08-25 NOTE — PATIENT INSTRUCTIONS
It was a pleasure speaking to you on the phone today.    We discussed this episode of blurred vision that resolved on its own. After our discussion, we did come to the conclusion that at present it is likely okay for you to continue on your vacation with her family. However we did discuss that should you have any new blurred vision, or new numbing, tingling, weakness, or facial changes such as a facial droop, you should present to your nearest emergency department in [place] for further evaluation.    Colt Johnson MD  Neurology resident

## 2017-08-25 NOTE — TELEPHONE ENCOUNTER
Reason for Disposition    Double vision    Additional Information    Negative: Followed getting substance in the eye    Negative: Foreign body or object is or was lodged in the eye    Negative: Followed an eye injury    Negative: Followed sun lamp or sun exposure (UV keratitis)    Negative: Yellow or green discharge (pus) in the eye    Negative: Pregnant    Negative: Postpartum    Negative: Complete loss of vision in 1 or both eyes    Negative: Severe eye pain    Negative: Severe headache    Protocols used: VISION LOSS OR CHANGE-ADULT-AH

## 2017-08-25 NOTE — TELEPHONE ENCOUNTER
----- Message from Jesús Garay RN sent at 8/25/2017  9:24 AM CDT -----  Regarding: FW: question  April, this is an oncology patient.  Will forward to appropriate RNCC    Cabrera, this karen is a colon CA with Brain mets.      Greyson   ----- Message -----     From: April Dumont LPN     Sent: 8/25/2017   8:31 AM       To: Jesús Garay RN  Subject: question                                         Caller name: patient called back because neuro ersident didn't call him back within 20 minutes. [phone]  Is traveling as a car passenger [place] hours away.    Treating provider/specialty: Debra Blanchard [facility]    Nurse:    Best time to return call: anytime     Message left?     Description of issue/question:  regarding -see today's earlier note.  Patient states his headache and double vision are now gone but wanted to touch base with MD since this is the first time these symptom  have ever happened.

## 2017-08-25 NOTE — TELEPHONE ENCOUNTER
Brief neurology note    Patient called in this morning asking for a call back. He describes a 10 minute episode shortly after waking of blurred vision that resolved on its own. He states the onset was relatively acute. He does think use may have still been able to read during this period. He also says he does not believe he actually had blurred vision if he would close one eye and look out only through the other. He denies any new focal deficits. He also describes a mild headache but then quickly resolved. Patient has a known history of seizures and is followed by Dr. Erasto Richardson in the neurology clinic. He was recently placed on phenobarbital. We discussed at length any other accompanying changes or deficits and he did say that there was nothing else that he could point to that was different during this episode of blurred vision. Immediate concern would be for potential stroke or seizure however blurred vision is not part of his existing seizure pathology and he had no other outward signs of seizures making this less likely. Similarly, there were no other noted focal deficits or changes to point towards stroke at this time. One possibility is that he had a side effect of his recently started phenobarbital which is currently thought most likely. He did note that he is currently away from [place] and in [facility] with family. He was not interested in further medical care at this time. We did discuss however that if he were to have any new changes, focal deficits, or even a return of this blurred vision, he should present to the nearest emergency department in [place]. Otherwise no changes at present to his current medication schedule. He did register his understanding and agreement with this plan.    Following this telephone conversation, this patient was discussed with Dr. Richardson who agreed with the after mentioned assessment and plan.    Colt Johnson MD  Neurology resident

## 2017-08-25 NOTE — TELEPHONE ENCOUNTER
Called patient to follow up on headaches and double vision. Left message for patient to return call.      1000- patient returned call. Patient states this morning he had headache this am with double vision. If he closed one eye vision improved. Double vision lasted about 10 minutes and is now improved. Headache continues, located on top of head at about 4/10. Patient is not currently on dex. Feels he will be able to manage headache with Tylenol. However, wanted to report symptoms as this is the first time he has had double vision.     Patient states he is currently out of town. Patient knows to call clinic if symptoms return or worsen and to seek care at  or ED for any changes in neurological status. Patient voiced understanding of all instructions. This note will be sent to Dr. Blanchard for any additional orders.

## 2017-08-25 NOTE — TELEPHONE ENCOUNTER
"Patient called the neurology triage line this morning with complaints of double vision early this morning which has since improved. He did have a slight headache this morning as well. He does have weakness in his extremities but this is not new. He is currently traveling and wondering if he should \"turn around\". He does have a complex medical history (colorectal cancer with mets to the brain). I suggested that patient contact the neurology or neurosurgery resident on call. They can review his history and symptoms to determine if he should be seen. Patient verbalized understanding and agreement with plan. He was unable to write down the contact info for the hospital and so I called him back and left a voicemail message with the number and instructions on what to do.   "

## 2017-08-29 NOTE — TELEPHONE ENCOUNTER
Prior Authorization Approval    Authorization Effective Date: 7/25/2017  Authorization Expiration Date: 8/25/2018  Medication: Lacosamide 200 mg-APPROVED  Approved Dose/Quantity:    Reference #:     Insurance Company: [employer] - Phone [phone] Fax [phone]  Expected CoPay: $0.00     CoPay Card Available:      Foundation Assistance Needed:    Which Pharmacy is filling the prescription (Not needed for infusion/clinic administered): [id number] [address] [place] [address]  Pharmacy Notified: Yes  Patient Notified: Yes

## 2017-09-11 NOTE — PROGRESS NOTES
Deandre Alex is here today in follow-up of metastatic rectal cancer.    He currently is on active treatment only with Avastin to control the cerebral edema associated with his brain metastasis. He's had multiple prior lines of therapy and is of a couple months ago had failed his last line of treatment with lonsurf. He is now retired from work and is greatly enjoying his time with his wife and kids going on numerous trips. His seizure activity is slowly worsening. Although this month he said he only had 12 seizures. He notes that his right-sided weakness is slowly progressing, but he still has good enough function in his right hand to manage his ostomy appliance which has been very important to him. He started to develop some pain in his right shoulder which he attributes to the dysfunctional musculature as his neurologic function worsens. Otherwise he has no significant sites of pain. He continues to cope quite well but is wishing had a little more certainty about the time frame.    On physical exam, he actually appears quite well, and cheerful.  He has no icterus and no palpable adenopathy in the neck or supraclavicular spaces.  His lungs are clear.  Heart rate and rhythm are regular.  His abdomen is soft and nontender without palpable mass or organomegaly.  He has no peripheral edema.  He has her para cysts of his right arm and leg, which is clearly worse than before, but was able to shake my hand without too much difficulty. He was able to mount the exam table with some effort.    I reviewed with him and his wife his imaging. His lung nodules are all growing, although only modestly and his brain metastasis shows evidence of modest progression as well. There are not any new lesions.     Assessment/plan: Metastatic colorectal cancer with slowly progressive disease. His performance status is declined a little bit, but he is still quite functional and has reasonably good quality of life. His biggest fear is reaching a  point where he can no longer manage his ostomy appliance on his own. For now he wants to continue the Avastin to try and keep his cerebral edema controlled, but will probably drop that at some point. His wife would like to get connected with hospice, although they are not willing to enroll just at because of the issues with the Avastin, but we talked quite a bit about being able to make a smooth transition into hospice care. They're getting a little bit more nervous at this point about whether they can manage some of his symptoms and other events as he gets closer to dying. They do have an appointment next week with palliative care to talk about any other assistance they might provide. I'll arrange for a home hospice team to contact them sometime this week, so they can set up a meeting and talk to somebody about the details of when they wanted to formally enroll and whether there might be some interim help they could provide. I'll follow-up with him again in a couple months for symptom management if needed. We decided today there wasn't much role for further imaging at this point.

## 2017-09-11 NOTE — LETTER
9/11/2017       RE: Deandre Alex  [address]  [facility] [address]     Dear Colleague,    Thank you for referring your patient, Deandre Alex, to the [facility]. Please see a copy of my visit note below.      Neurosurgery Clinic Note     CHIEF COMPLAINT: metastatic colorectal cancer     HISTORY OF PRESENT ILLNESS: The patient is a 47 year old who we are seeing for metastatic colon cancer. He is on avastin and his seizure medications. lnsurf has been discontinued. Our last visit was on 7/14/2017. Neurologic function in his right hand has continued to decline and he is now pursuing palliative care. An MRI performed today shows interval progression of the left frontoparietal tumor when compared to the 7/10 scan.     The patient's past medical, surgical, family and social history as well as medications/allergies were reviewed and updated where appropriate.     A 10 point ROS of systems including Constitutional, Eyes, Respiratory, Cardiovascular, Gastroenterology, Genitourinary, Integumentary, Muscularskeletal, Psychiatric were all negative except for pertinent positives noted in my HPI.    PHYSICAL EXAM:   Comfortable respiratory effort; normal cardiac rhythm. Awake and oriented to person, place, time. Cranial nerve exam showed full visual fields, equal and reactive pupils, full range of extraocular movement, symmetric facial sensation, symmetric facial expression and no dysarthria. Motor power is 5/5 in both upper and lower extremities with appropriate bulk and tone except for distal Right lower extremity weakness requiring a splint and impaired finger abduction/grasp in the right hand. There are no appreciable deficits to pain or soft touch      The patients LABS/IMAGING were reviewed.       ASSESSMENT/PLAN:  The patient has interval progression of his metastatic colon cancer. He will continue the Avastin until he feels it is not offering benefit. Palliative care is involved and the patient  is coming to  with his situation. No follow up required and we agree with oncology that no additional imaging is needed. Will follow up PRN.        John (Jack) M. Leschke, M.D.     Our patient was discussed with my attending physician.     I saw the patient with the resident.  I have reviewed and edited the resident note and agree with the plan of care.      Jewel Blanchard MD         Again, thank you for allowing me to participate in the care of your patient.      Sincerely,    Jewel Blanchard MD

## 2017-09-11 NOTE — PROGRESS NOTES
Infusion Nursing Note:  Deandre Alex presents today for Cycle 2 Day 1 Avastin.    Patient seen by provider today: Yes: Dr. Ramon   present during visit today: Not Applicable.    Note: N/A.    Intravenous Access:  Implanted Port.    Treatment Conditions:  Lab Results   Component Value Date    HGB 14.5 09/11/2017     Lab Results   Component Value Date    WBC 6.8 09/11/2017      Lab Results   Component Value Date    ANEU 5.0 09/11/2017     Lab Results   Component Value Date     09/11/2017      Lab Results   Component Value Date     09/11/2017                   Lab Results   Component Value Date    POTASSIUM 4.5 09/11/2017           Lab Results   Component Value Date    MAG 2.2 03/04/2015            Lab Results   Component Value Date    CR 0.90 09/11/2017                   Lab Results   Component Value Date    FAYE 8.5 09/11/2017                Lab Results   Component Value Date    BILITOTAL 0.6 09/11/2017           Lab Results   Component Value Date    ALBUMIN 3.5 09/11/2017                    Lab Results   Component Value Date    ALT 22 09/11/2017           Lab Results   Component Value Date    AST 28 09/11/2017     Results reviewed, labs MET treatment parameters, ok to proceed with treatment.  Urine protein negative.  /79.          Post Infusion Assessment:  Patient tolerated infusion without incident.  Blood return noted pre and post infusion.  Site patent and intact, free from redness, edema or discomfort.  No evidence of extravasations.  Access discontinued per protocol.  Avastin stop time: 1321    Discharge Plan:   Patient declined prescription refills.  Discharge instructions reviewed with: Patient.  Patient and/or family verbalized understanding of discharge instructions and all questions answered.  Copy of AVS reviewed with patient and/or family.  Patient will return 11/13/17 for next appointment.  Patient discharged in stable condition accompanied by: self and wife.  Departure  Mode: Ambulatory.  Face to Face time: 0 minutes.    Elizabeth Hopkins RN

## 2017-09-11 NOTE — NURSING NOTE
"Oncology Rooming Note    September 11, 2017 10:59 AM   Deandre Alex is a 47 year old male who presents for:    Chief Complaint   Patient presents with     Port Draw     Port labs collected.      Oncology Clinic Visit     Return visit related to Rectal Cancer     Initial Vitals: /79  Pulse 66  Temp 98.4  F (36.9  C) (Oral)  Resp 16  Ht 1.778 m (5' 10\")  Wt 77.1 kg (170 lb)  SpO2 97%  BMI 24.39 kg/m2 Estimated body mass index is 24.39 kg/(m^2) as calculated from the following:    Height as of this encounter: 1.778 m (5' 10\").    Weight as of this encounter: 77.1 kg (170 lb). Body surface area is 1.95 meters squared.  No Pain (0) Comment: Data Unavailable   No LMP for male patient.  Allergies reviewed: Yes  Medications reviewed: Yes    Medications: MEDICATION REFILLS NEEDED TODAY. Provider was notified.  Pharmacy name entered into [employer]: [id number] [address] [place] [address]    Clinical concerns: Refill needed for Lorazepam (ATIVAN) 0.5MG Tablet   Provider was notified.    10 minutes for nursing intake (face to face time)     Deborah Hughes LPN            "

## 2017-09-11 NOTE — PROGRESS NOTES
Neurosurgery Clinic Note     CHIEF COMPLAINT: metastatic colorectal cancer     HISTORY OF PRESENT ILLNESS: The patient is a 47 year old who we are seeing for metastatic colon cancer. He is on avastin and his seizure medications. lnsurf has been discontinued. Our last visit was on 7/14/2017. Neurologic function in his right hand has continued to decline and he is now pursuing palliative care. An MRI performed today shows interval progression of the left frontoparietal tumor when compared to the 7/10 scan.     The patient's past medical, surgical, family and social history as well as medications/allergies were reviewed and updated where appropriate.     A 10 point ROS of systems including Constitutional, Eyes, Respiratory, Cardiovascular, Gastroenterology, Genitourinary, Integumentary, Muscularskeletal, Psychiatric were all negative except for pertinent positives noted in my HPI.    PHYSICAL EXAM:   Comfortable respiratory effort; normal cardiac rhythm. Awake and oriented to person, place, time. Cranial nerve exam showed full visual fields, equal and reactive pupils, full range of extraocular movement, symmetric facial sensation, symmetric facial expression and no dysarthria. Motor power is 5/5 in both upper and lower extremities with appropriate bulk and tone except for distal Right lower extremity weakness requiring a splint and impaired finger abduction/grasp in the right hand. There are no appreciable deficits to pain or soft touch      The patients LABS/IMAGING were reviewed.       ASSESSMENT/PLAN:  The patient has interval progression of his metastatic colon cancer. He will continue the Avastin until he feels it is not offering benefit. Palliative care is involved and the patient is coming to  with his situation. No follow up required and we agree with oncology that no additional imaging is needed. Will follow up PRN.        John (Jack) M. Leschke, M.D.     Our patient was discussed with my attending  physician.     I saw the patient with the resident.  I have reviewed and edited the resident note and agree with the plan of care.      Jewel Blanchard MD

## 2017-09-11 NOTE — MR AVS SNAPSHOT
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  Home Care or Hospice will then contact the patient to schedule their appointment.**    If you do not hear from Home Care and Hospice, or you would like to call to schedule, please call the referring place of service that your provider has listed below.  ______________________________________________________________________    Your provider has referred you to: [facility]: [facility] [phone]   [email or web address]    Extended Emergency Contact Information  Primary Emergency Contact: Sunita Alex  Address: [address] [place] [address]  Home Phone: [phone]  Mobile Phone: [phone]  Relation: Spouse  Secondary Emergency Contact: Reinaldo Alex [place] [facility]  Home Phone: [phone]  Mobile Phone: [phone]  Relation: Brother    Patient Anticipated Discharge Date:    RN, PT, HHA to begin 24 - 48 hours after discharge.  PLEASE EVALUATE AND TREAT (Evaluation timeline is 24 - 48 hrs. Please call if there is need for a variance to this timeline).    REASON FOR REFERRAL:  Hospice - Diagnosis: Rectal Ca    ADDITIONAL SERVICES NEEDED:     OTHER PERTINENT INFORMATION: Patient was last seen by provider on  for .    Current Outpatient Prescriptions:  PHENobarbital (LUMINAL) 15 MG tablet, , Disp: , Rfl:   LORazepam (ATIVAN) 0.5 MG tablet, 2 tabs q 8 hrs as needed for seizures, Disp: 60 tablet, Rfl: 5  PHENobarbital (LUMINAL) 60 MG tablet, 1 tab at night, Disp: 60 tablet, Rfl: 5  lacosamide (VIMPAT) 200 MG TABS tablet, Take 1.5 tabs (300mg) by mouth twice daily, Disp: 90 tablet, Rfl: 5  levETIRAcetam 1000 MG TABS, Take 2 tabs (2000mg) by mouth twice daily, Disp: 120 tablet, Rfl: 11  meclizine (ANTIVERT) 25 MG tablet, Take 25 mg by mouth as needed, Disp: , Rfl: 0  ondansetron (ZOFRAN-ODT) 8 MG ODT tab, Take 1 tablet (8 mg) by mouth every 8 hours as needed for nausea, Disp: 30 tablet, Rfl: 3      Patient Active Problem List:     Rectal cancer (H)     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)     Metastasis to lung (H)     Metastasis to brain (H)     Encounter for palliative care     Adjustment disorder with anxious mood     Advanced care planning/counseling discussion     Localization-related focal epilepsy with simple partial seizures (H)     HA (headache)      Documentation of Face to Face and Certification for Home Health Services    I certify that patient, Deandre Alex is under my care and that I, or a Nurse Practitioner or Physician's Assistant working with me, had a face-to-face encounter that meets the physician face-to-face encounter requirements with this patient on: 9/11/2017.    This encounter with the patient was in whole, or in part, for the following medical condition, which is the primary reason for Home Health Care: Rectal cancer.    I certify that, based on my findings, the following services are medically necessary Home Health Services: Hospice    My clinical findings support the need for the above services because:     Further, I certify that my clinical findings support  that this patient is homebound (i.e. absences from home require considerable and taxing effort and are for medical reasons or [facility] services or infrequently or of short duration when for other reasons) because: end of life care      Based on the above findings, I certify that this patient is confined to the home and needs intermittent skilled nursing care, physical therapy and/or speech therapy.  The patient is under my care, and I have initiated the establishment of the plan of care.  This patient will be followed by a physician who will periodically review the plan of care.    Physician/Provider to provide follow up care: Erasto Richardson certified Physician at time of discharge: Yenny    Please be aware that coverage of these services is subject to the terms and limitations of your health insurance plan.  Call member services at your health plan with any benefit or coverage questions.                  Follow-up notes from your care team     Return in about 2 months (around 11/13/2017) for MD visit .      Your next 10 appointments already scheduled     Sep 20, 2017  4:00 PM CDT   (Arrive by 3:45 PM)   Return Visit with J Carlos Hannon MD   [facility] ([facility])    [address] [place] [id number] [phone]            Nov 13, 2017  8:15 AM CST   (Arrive by 8:00 AM)   Return Visit with Willis Ramon MD   [facility] ([facility])    [address] [place] [id number] [phone]              Who to contact     If you have questions or need follow up information about today's clinic visit or your schedule please contact [facility] directly at [phone].  Normal or non-critical lab and imaging results will be communicated to you by MyChart, letter or phone within 4 business days after the clinic has received the  "results. If you do not hear from us within 7 days, please contact the clinic through [employer]My[employer] or phone. If you have a critical or abnormal lab result, we will notify you by phone as soon as possible.  Submit refill requests through [employer]My[employer] or call your pharmacy and they will forward the refill request to us. Please allow 3 business days for your refill to be completed.          Additional Information About Your Visit        TapMyBack Information     T[employer]MyBack gives you secure access to your electronic health record. If you see a primary care provider, you can also send messages to your care team and make appointments. If you have questions, please call your primary care clinic.  If you do not have a primary care provider, please call [phone] and they will assist you.        Care EveryWhere ID     This is your Care EveryWhere ID. This could be used by other organizations to access your [place] medical records  [id number]        Your Vitals Were     Pulse Temperature Respirations Height Pulse Oximetry BMI (Body Mass Index)    66 98.4  F (36.9  C) (Oral) 16 1.778 m (5' 10\") 97% 24.39 kg/m2       Blood Pressure from Last 3 Encounters:   09/11/17 120/79   09/11/17 120/79   08/11/17 119/80    Weight from Last 3 Encounters:   09/11/17 77.1 kg (170 lb)   09/11/17 77.1 kg (170 lb)   08/11/17 78 kg (172 lb)              We Performed the Following     HOSPICE REFERRAL          Today's Medication Changes          These changes are accurate as of: 9/11/17  5:39 PM.  If you have any questions, ask your nurse or doctor.               Start taking these medicines.        Dose/Directions    oxyCODONE 5 MG IR tablet   Commonly known as:  ROXICODONE   Used for:  Metastasis to brain (H), Nonintractable episodic headache, unspecified headache type   Started by:  Jewel Blanchard MD        Dose:  5 mg   Take 1 tablet (5 mg) by mouth every 4 hours as needed for moderate to severe pain   Quantity:  30 tablet   Refills:  0          "   Where to get your medicines      Some of these will need a paper prescription and others can be bought over the counter.  Ask your nurse if you have questions.     Bring a paper prescription for each of these medications     LORazepam 0.5 MG tablet    oxyCODONE 5 MG IR tablet                Primary Care Provider Office Phone # Fax #    Erasto Richardson [phone]       [address] [place] [address] [address]  [facility] [address]        Equal Access to Services     JOY BECKFORD : Hadii aad ku hadasho Soomaali, waaxda luqadaha, qaybta kaalmada adeegyada, waxay idiin hayaan radha padylanjohnathan foy . So [facility] [phone].    ATENCIÓN: Si habla espbeth, tiene a kimball disposición servicios gratuitos de asistencia lingüística. Ab[facility] [phone].    We comply with applicable federal civil rights laws and [place] laws. We do not discriminate on the basis of race, color, national origin, age, disability sex, sexual orientation or gender identity.            Thank you!     Thank you for choosing [facility]  for your care. Our goal is always to provide you with excellent care. Hearing back from our patients is one way we can continue to improve our services. Please take a few minutes to complete the written survey that you may receive in the mail after your visit with us. Thank you!             Your Updated Medication List - Protect others around you: Learn how to safely use, store and throw away your medicines at www.disposemymeds.org.          This list is accurate as of: 9/11/17  5:39 PM.  Always use your most recent med list.                   Brand Name Dispense Instructions for use Diagnosis    lacosamide 200 MG Tabs tablet    VIMPAT    90 tablet    Take 1.5 tabs (300mg) by mouth twice daily    Localization-related focal epilepsy with simple partial seizures (H), Rectal cancer (H), Secondary malignant neoplasm of liver (H), Metastasis to brain (H)       levETIRAcetam 1000 MG Tabs     120 tablet     Take 2 tabs (2000mg) by mouth twice daily    Localization-related focal epilepsy with simple partial seizures (H), Rectal cancer (H), Secondary malignant neoplasm of liver (H), Metastasis to brain (H)       LORazepam 0.5 MG tablet    ATIVAN    60 tablet    2 tabs q 8 hrs as needed for seizures    Rectal cancer (H), Secondary malignant neoplasm of liver (H), Metastasis to brain (H), Localization-related focal epilepsy with simple partial seizures (H)       meclizine 25 MG tablet    ANTIVERT     Take 25 mg by mouth as needed        ondansetron 8 MG ODT tab    ZOFRAN-ODT    30 tablet    Take 1 tablet (8 mg) by mouth every 8 hours as needed for nausea    Rectal cancer (H), Secondary malignant neoplasm of liver (H), Metastasis to brain (H), Malignant neoplasm metastatic to lung, unspecified laterality (H)       oxyCODONE 5 MG IR tablet    ROXICODONE    30 tablet    Take 1 tablet (5 mg) by mouth every 4 hours as needed for moderate to severe pain    Metastasis to brain (H), Nonintractable episodic headache, unspecified headache type       * PHENobarbital 60 MG tablet    LUMINAL    60 tablet    1 tab at night    Seizure disorder, focal motor (H)       * PHENobarbital 15 MG tablet    LUMINAL          * Notice:  This list has 2 medication(s) that are the same as other medications prescribed for you. Read the directions carefully, and ask your doctor or other care provider to review them with you.

## 2017-09-11 NOTE — LETTER
9/11/2017      RE: Deandre Alex  [address]  [facility] [address]       Deandre Alex is here today in follow-up of metastatic rectal cancer.    He currently is on active treatment only with Avastin to control the cerebral edema associated with his brain metastasis. He's had multiple prior lines of therapy and is of a couple months ago had failed his last line of treatment with lonsurf. He is now retired from work in his bed greatly enjoying his time with his wife and kids going on numerous trips his seizure activity is slowly worsening. Although this month. He said he only had 12, seizures. He notes that his right-sided weakness is slowly progressing, but he still has good. No function in his right hand to manage his ostomy appliance which is been very important to him. He started to develop some pain in his right shoulder which she attributes to the dysfunctional musculature as his neurologic function worsens. Otherwise he has no significant sites of pain. He continues to cope quite well but is wishing had a little more certainty about the time frame.    On physical exam, he actually appears quite well, and cheerful.  He has no icterus and no palpable adenopathy in the neck or supraclavicular spaces.  His lungs are clear.  Heart rate and rhythm are regular.  His abdomen is soft and nontender without palpable mass or organomegaly.  He has no peripheral edema.  He has her para cysts of his right arm and leg, which is clearly worse than before, but was able to shake my hand without too much difficulty. He was able to mount the exam table with some effort.    I reviewed with him and his wife his imaging his lung nodules are all growing, although only modestly and his brain metastasis shows evidence of modest progression as well. There are not any new lesions. However    Assessment/plan: Metastatic colorectal cancer with slowly progressive disease. His performance status is declined a little bit, but he still  quite functional and has recently good quality of life. His biggest fear is reaching a point where he can no longer manage his ostomy appliance on his own for now he wants to continue the Avastin to try and keep his cerebral edema controlled, but will probably drop that at some point. His wife would like to get connected with hospice, although they are not willing to enroll just at because of the issues with the Avastin but we talked quite a bit about being able to make a smooth transition into hospice care. They're getting a little bit more nervous at this point but rather can manage some of his symptoms and other events as he gets closer to dying. They do have an appointment next week with palliative care to talk about any other assistance and might provide, I'll arrange for a home hospice team to contact them sometime this week, as they can set up a meeting and talk to somebody the details of when they wanted to formally enroll in whether there might be some interim help they could provide a follow-up with him again in a couple months for symptom management if needed. We decided today there wasn't much role for further imaging at this point.    Willis Ramon MD

## 2017-09-11 NOTE — MR AVS SNAPSHOT
After Visit Summary   9/11/2017    Deandre Alex    MRN: [id number]           Patient Information     Date Of Birth          1970        Visit Information        Provider Department      9/11/2017 2:45 PM Jewel Blanchard MD [facility]        Today's Diagnoses     Metastasis to brain (H)    -  1    Nonintractable episodic headache, unspecified headache type           Follow-ups after your visit        Your next 10 appointments already scheduled     Sep 20, 2017  4:00 PM CDT   (Arrive by 3:45 PM)   Return Visit with J Carlos Hannon MD   [facility] ([facility])    [address] [place] [id number] [phone]            Nov 13, 2017  8:15 AM CST   (Arrive by 8:00 AM)   Return Visit with Willis Ramon MD   [facility] ([facility])    [address] [place] [id number] [phone]              Who to contact     If you have questions or need follow up information about today's clinic visit or your schedule please contact [facility] directly at [phone].  Normal or non-critical lab and imaging results will be communicated to you by MyChart, letter or phone within 4 business days after the clinic has received the results. If you do not hear from us within 7 days, please contact the clinic through MyChart or phone. If you have a critical or abnormal lab result, we will notify you by phone as soon as possible.  Submit refill requests through Hooked or call your pharmacy and they will forward the refill request to us. Please allow 3 business days for your refill to be completed.          Additional Information About Your Visit        P[employer] Information     Hooked gives you secure access to your electronic health record. If you see a primary care provider, you can also send messages to your care team and  make appointments. If you have questions, please call your primary care clinic.  If you do not have a primary care provider, please call [phone] and they will assist you.        Care EveryWhere ID     This is your Care EveryWhere ID. This could be used by other organizations to access your [place] medical records  [id number]        Your Vitals Were     Pulse Temperature Respirations Pulse Oximetry BMI (Body Mass Index)       66 98.4  F (36.9  C) (Oral) 18 97% 24.39 kg/m2        Blood Pressure from Last 3 Encounters:   09/11/17 120/79   09/11/17 120/79   08/11/17 119/80    Weight from Last 3 Encounters:   09/11/17 77.1 kg (170 lb)   09/11/17 77.1 kg (170 lb)   08/11/17 78 kg (172 lb)              Today, you had the following     No orders found for display         Today's Medication Changes          These changes are accurate as of: 9/11/17 11:59 PM.  If you have any questions, ask your nurse or doctor.               Start taking these medicines.        Dose/Directions    oxyCODONE 5 MG IR tablet   Commonly known as:  ROXICODONE   Used for:  Metastasis to brain (H), Nonintractable episodic headache, unspecified headache type   Started by:  Jewel Blanchard MD        Dose:  5 mg   Take 1 tablet (5 mg) by mouth every 4 hours as needed for moderate to severe pain   Quantity:  30 tablet   Refills:  0            Where to get your medicines      Some of these will need a paper prescription and others can be bought over the counter.  Ask your nurse if you have questions.     Bring a paper prescription for each of these medications     LORazepam 0.5 MG tablet    oxyCODONE 5 MG IR tablet                Primary Care Provider Office Phone # Fax #    Erasto Richardson [phone]       [address] [address]        Equal Access to Services     JOY BECKFORD AH: Alfred Garcia, waderik gooedn, qaybta kaaltru campos. So  [facility] [phone].    ATENCIÓN: Si lindy salazar, tiene a kimball disposición servicios gratuitos de asistencia lingüística. Ervin pulido [phone].    We comply with applicable federal civil rights laws and [place] laws. We do not discriminate on the basis of race, color, national origin, age, disability sex, sexual orientation or gender identity.            Thank you!     Thank you for choosing [facility]  for your care. Our goal is always to provide you with excellent care. Hearing back from our patients is one way we can continue to improve our services. Please take a few minutes to complete the written survey that you may receive in the mail after your visit with us. Thank you!             Your Updated Medication List - Protect others around you: Learn how to safely use, store and throw away your medicines at www.disposemymeds.org.          This list is accurate as of: 9/11/17 11:59 PM.  Always use your most recent med list.                   Brand Name Dispense Instructions for use Diagnosis    lacosamide 200 MG Tabs tablet    VIMPAT    90 tablet    Take 1.5 tabs (300mg) by mouth twice daily    Localization-related focal epilepsy with simple partial seizures (H), Rectal cancer (H), Secondary malignant neoplasm of liver (H), Metastasis to brain (H)       levETIRAcetam 1000 MG Tabs     120 tablet    Take 2 tabs (2000mg) by mouth twice daily    Localization-related focal epilepsy with simple partial seizures (H), Rectal cancer (H), Secondary malignant neoplasm of liver (H), Metastasis to brain (H)       LORazepam 0.5 MG tablet    ATIVAN    60 tablet    2 tabs q 8 hrs as needed for seizures    Rectal cancer (H), Secondary malignant neoplasm of liver (H), Metastasis to brain (H), Localization-related focal epilepsy with simple partial seizures (H)       meclizine 25 MG tablet    ANTIVERT     Take 25 mg by mouth as needed        ondansetron 8 MG ODT tab    ZOFRAN-ODT    30 tablet    Take 1 tablet (8  mg) by mouth every 8 hours as needed for nausea    Rectal cancer (H), Secondary malignant neoplasm of liver (H), Metastasis to brain (H), Malignant neoplasm metastatic to lung, unspecified laterality (H)       oxyCODONE 5 MG IR tablet    ROXICODONE    30 tablet    Take 1 tablet (5 mg) by mouth every 4 hours as needed for moderate to severe pain    Metastasis to brain (H), Nonintractable episodic headache, unspecified headache type       * PHENobarbital 60 MG tablet    LUMINAL    60 tablet    1 tab at night    Seizure disorder, focal motor (H)       * PHENobarbital 15 MG tablet    LUMINAL          * Notice:  This list has 2 medication(s) that are the same as other medications prescribed for you. Read the directions carefully, and ask your doctor or other care provider to review them with you.

## 2017-09-11 NOTE — NURSING NOTE
"Oncology Rooming Note    September 11, 2017 3:13 PM   Deandre Alex is a 47 year old male who presents for:    Chief Complaint   Patient presents with     RECHECK     Patient with metastasis of brain here for provider     Initial Vitals: /79  Pulse 66  Temp 98.4  F (36.9  C) (Oral)  Resp 18  Wt 77.1 kg (170 lb)  SpO2 97%  BMI 24.39 kg/m2 Estimated body mass index is 24.39 kg/(m^2) as calculated from the following:    Height as of an earlier encounter on 9/11/17: 1.778 m (5' 10\").    Weight as of this encounter: 77.1 kg (170 lb). Body surface area is 1.95 meters squared.  Data Unavailable Comment: Data Unavailable   No LMP for male patient.  Allergies reviewed: Yes  Medications reviewed: Yes    Medications: Medication refills not needed today.  Pharmacy name entered into [employer]: [id number] [address] [place] [address]    Clinical concerns: NA NA was NOT notified.    4 minutes for nursing intake (face to face time)     Nelli Winslow, DIAMOND              "

## 2017-09-11 NOTE — PATIENT INSTRUCTIONS
Contact Numbers    [facility] Main Line: [phone]  [facility] Triage:  [phone]    Call triage with chills and/or temperature greater than or equal to 100.5, uncontrolled nausea/vomiting, diarrhea, constipation, dizziness, shortness of breath, chest pain, bleeding, unexplained bruising, or any new/concerning symptoms, questions/concerns.     If you are having any concerning symptoms or wish to speak to a provider before your next infusion visit, please call your care coordinator or triage to notify them so we can adequately serve you.       After Hours: [phone]    If after hours, weekends, or holidays, call main hospital  and ask for Oncology doctor on call.         September 2017 Sunday Monday Tuesday Wednesday Thursday Friday Saturday                            1     2       3     4     5     6     7     8     9       10     11     MR BRAIN WWO    7:30 AM   (45 min.)   [id number]   [facility] MRI     CT CHEST ABDOMEN PELVIS WWO    8:25 AM   (20 min.)   UCCT2   [facility] CT     [facility] LAB DRAW    9:00 AM   (15 min.)    [facility] LAB DRAW   [facility] Lab Draw     UM RETURN   11:00 AM   (30 min.)   Willis Ramon MD   [facility] ONC INFUSION 60   12:00 PM   (60 min.)   UC ONCOLOGY INFUSION   [facility] RETURN    2:30 PM   (15 min.)   Jewel Blanchard MD   [facility] 12     13     14     15     16       17     18     19     20     [facility] RETURN    3:45 PM   (45 min.)   J Carlos Hannon MD   [facility] 21     22     23       24     25     26     27     28     29     30                October 2017 Sunday Monday Tuesday Wednesday Thursday Friday Saturday   1     2     3     4     5     6     7       8     9     10     11     12     13     14       15     16     17     18     19     20     21       22     23     24     25     26     27     28       29     30      31                                     Recent Results (from the past 24 hour(s))   *CBC with platelets differential    Collection Time: 09/11/17  9:42 AM   Result Value Ref Range    WBC 6.8 4.0 - 11.0 10e9/L    RBC Count 4.26 (L) 4.4 - 5.9 10e12/L    Hemoglobin 14.5 13.3 - 17.7 g/dL    Hematocrit 40.8 40.0 - 53.0 %    MCV 96 78 - 100 fl    MCH 34.0 (H) 26.5 - 33.0 pg    MCHC 35.5 31.5 - 36.5 g/dL    RDW 12.3 10.0 - 15.0 %    Platelet Count 129 (L) 150 - 450 10e9/L    Diff Method Automated Method     % Neutrophils 73.4 %    % Lymphocytes 7.0 %    % Monocytes 5.7 %    % Eosinophils 12.9 %    % Basophils 0.6 %    % Immature Granulocytes 0.4 %    Nucleated RBCs 0 0 /100    Absolute Neutrophil 5.0 1.6 - 8.3 10e9/L    Absolute Lymphocytes 0.5 (L) 0.8 - 5.3 10e9/L    Absolute Monocytes 0.4 0.0 - 1.3 10e9/L    Absolute Eosinophils 0.9 (H) 0.0 - 0.7 10e9/L    Absolute Basophils 0.0 0.0 - 0.2 10e9/L    Abs Immature Granulocytes 0.0 0 - 0.4 10e9/L    Absolute Nucleated RBC 0.0    Comprehensive metabolic panel    Collection Time: 09/11/17  9:42 AM   Result Value Ref Range    Sodium 132 (L) 133 - 144 mmol/L    Potassium 4.5 3.4 - 5.3 mmol/L    Chloride 102 94 - 109 mmol/L    Carbon Dioxide 25 20 - 32 mmol/L    Anion Gap 5 3 - 14 mmol/L    Glucose 92 70 - 99 mg/dL    Urea Nitrogen 18 7 - 30 mg/dL    Creatinine 0.90 0.66 - 1.25 mg/dL    GFR Estimate >90 >60 mL/min/1.7m2    GFR Estimate If Black >90 >60 mL/min/1.7m2    Calcium 8.5 8.5 - 10.1 mg/dL    Bilirubin Total 0.6 0.2 - 1.3 mg/dL    Albumin 3.5 3.4 - 5.0 g/dL    Protein Total 7.3 6.8 - 8.8 g/dL    Alkaline Phosphatase 106 40 - 150 U/L    ALT 22 0 - 70 U/L    AST 28 0 - 45 U/L   CEA    Collection Time: 09/11/17  9:42 AM   Result Value Ref Range    .1 (H) 0 - 2.5 ug/L   Protein qualitative urine    Collection Time: 09/11/17 12:12 PM   Result Value Ref Range    Protein Albumin Urine Negative NEG^Negative mg/dL

## 2017-09-11 NOTE — MR AVS SNAPSHOT
After Visit Summary   9/11/2017    Deandre Alex    MRN: [id number]           Patient Information     Date Of Birth          1970        Visit Information        Provider Department      9/11/2017 12:00 PM  15 ATC;  ONCOLOGY INFUSION [facility]        Today's Diagnoses     Metastasis to brain (H)    -  1    Malignant neoplasm metastatic to lung, unspecified laterality (H)        Rectal cancer (H)        Encounter for long-term (current) use of medications          Care Instructions    Contact Numbers    [facility] Main Line: [phone]  [facility] Triage:  [phone]    Call triage with chills and/or temperature greater than or equal to 100.5, uncontrolled nausea/vomiting, diarrhea, constipation, dizziness, shortness of breath, chest pain, bleeding, unexplained bruising, or any new/concerning symptoms, questions/concerns.     If you are having any concerning symptoms or wish to speak to a provider before your next infusion visit, please call your care coordinator or triage to notify them so we can adequately serve you.       After Hours: [phone]    If after hours, weekends, or holidays, call main hospital  and ask for Oncology doctor on call.         September 2017 Sunday Monday Tuesday Wednesday Thursday Friday Saturday                            1     2       3     4     5     6     7     8     9       10     11     MR BRAIN WWO    7:30 AM   (45 min.)   [id number]   [facility] MRI     CT CHEST ABDOMEN PELVIS WWO    8:25 AM   (20 min.)   UCCT2   [facility] CT     [facility]    9[facility]00 AM   (15 min.)   [facility] LAB DRAW   [facility] Lab Draw     [facility] RETURN   11:00 AM   (30 min.)   Willis Ramon MD   [facility] ONC INFUSION 60   12:00 PM   (60 min.)    ONCOLOGY INFUSION   [facility] RETURN    2:30 PM   (15 min.)   Jewel Blanchard MD   [facility]  Clinic 12     13     14     15     16       17     18     19     20     [facility] RETURN    3:45 PM   (45 min.)   J Carlos Hannon MD   [facility] 21     22     23       24     25     26     27     28     29     30 October 2017 Sunday Monday Tuesday Wednesday Thursday Friday Saturday   1     2     3     4     5     6     7       8     9     10     11     12     13     14       15     16     17     18     19     20     21       22     23     24     25     26     27     28       29     30     31                                     Recent Results (from the past 24 hour(s))   *CBC with platelets differential    Collection Time: 09/11/17  9:42 AM   Result Value Ref Range    WBC 6.8 4.0 - 11.0 10e9/L    RBC Count 4.26 (L) 4.4 - 5.9 10e12/L    Hemoglobin 14.5 13.3 - 17.7 g/dL    Hematocrit 40.8 40.0 - 53.0 %    MCV 96 78 - 100 fl    MCH 34.0 (H) 26.5 - 33.0 pg    MCHC 35.5 31.5 - 36.5 g/dL    RDW 12.3 10.0 - 15.0 %    Platelet Count 129 (L) 150 - 450 10e9/L    Diff Method Automated Method     % Neutrophils 73.4 %    % Lymphocytes 7.0 %    % Monocytes 5.7 %    % Eosinophils 12.9 %    % Basophils 0.6 %    % Immature Granulocytes 0.4 %    Nucleated RBCs 0 0 /100    Absolute Neutrophil 5.0 1.6 - 8.3 10e9/L    Absolute Lymphocytes 0.5 (L) 0.8 - 5.3 10e9/L    Absolute Monocytes 0.4 0.0 - 1.3 10e9/L    Absolute Eosinophils 0.9 (H) 0.0 - 0.7 10e9/L    Absolute Basophils 0.0 0.0 - 0.2 10e9/L    Abs Immature Granulocytes 0.0 0 - 0.4 10e9/L    Absolute Nucleated RBC 0.0    Comprehensive metabolic panel    Collection Time: 09/11/17  9:42 AM   Result Value Ref Range    Sodium 132 (L) 133 - 144 mmol/L    Potassium 4.5 3.4 - 5.3 mmol/L    Chloride 102 94 - 109 mmol/L    Carbon Dioxide 25 20 - 32 mmol/L    Anion Gap 5 3 - 14 mmol/L    Glucose 92 70 - 99 mg/dL    Urea Nitrogen 18 7 - 30 mg/dL    Creatinine 0.90 0.66 - 1.25 mg/dL    GFR Estimate >90 >60 mL/min/1.7m2    GFR Estimate If Black >90 >60  mL/min/1.7m2    Calcium 8.5 8.5 - 10.1 mg/dL    Bilirubin Total 0.6 0.2 - 1.3 mg/dL    Albumin 3.5 3.4 - 5.0 g/dL    Protein Total 7.3 6.8 - 8.8 g/dL    Alkaline Phosphatase 106 40 - 150 U/L    ALT 22 0 - 70 U/L    AST 28 0 - 45 U/L   CEA    Collection Time: 09/11/17  9:42 AM   Result Value Ref Range    .1 (H) 0 - 2.5 ug/L   Protein qualitative urine    Collection Time: 09/11/17 12:12 PM   Result Value Ref Range    Protein Albumin Urine Negative NEG^Negative mg/dL                 Follow-ups after your visit        Your next 10 appointments already scheduled     Sep 11, 2017  2:45 PM CDT   (Arrive by 2:30 PM)   Return Visit with Jewel Blanchard MD   [facility] ([facility])    [address] [place] [address] [id number] [phone]            Sep 20, 2017  4:00 PM CDT   (Arrive by 3:45 PM)   Return Visit with J Carlos Hannon MD   [facility] ([facility])    [address] [place] [address] [id number] [phone]            Nov 13, 2017  8:15 AM CST   (Arrive by 8:00 AM)   Return Visit with Willis Ramon MD   [facility] ([facility])    [address] [place] [address] [id number] [phone]              Who to contact     If you have questions or need follow up information about today's clinic visit or your schedule please contact [facility] directly at [phone].  Normal or non-critical lab and imaging results will be communicated to you by MyChart, letter or phone within 4 business days after the clinic has received the results. If you do not hear from us within 7 days, please contact the clinic through MyChart or phone. If you have a critical or abnormal lab result, we will notify you by phone as soon as possible.  Submit refill requests through [employer] or call your pharmacy and they  will forward the refill request to us. Please allow 3 business days for your refill to be completed.          Additional Information About Your Visit        [employer] Information     HW gives you secure access to your electronic health record. If you see a primary care provider, you can also send messages to your care team and make appointments. If you have questions, please call your primary care clinic.  If you do not have a primary care provider, please call [phone] and they will assist you.        Care EveryWhere ID     This is your Care EveryWhere ID. This could be used by other organizations to access your [place] medical records  [id number]         Blood Pressure from Last 3 Encounters:   09/11/17 120/79   08/11/17 119/80   08/10/17 126/83    Weight from Last 3 Encounters:   09/11/17 77.1 kg (170 lb)   08/11/17 78 kg (172 lb)   08/10/17 78 kg (172 lb)              We Performed the Following     *CBC with platelets differential     CEA     Comprehensive metabolic panel     Protein qualitative urine          Where to get your medicines      Some of these will need a paper prescription and others can be bought over the counter.  Ask your nurse if you have questions.     Bring a paper prescription for each of these medications     LORazepam 0.5 MG tablet          Primary Care Provider Office Phone # Fax #    Erasto Richardson [phone]       [address] [place] [address]  [facility] [address]        Equal Access to Services     JOY BECKFORD : Hadjosefina Garcia, waaxda igacomo, qaybta kaalmatru herr. So [facility] [phone].    ATENCIÓN: Si habla español, tiene a kimball disposición servicios gratuitos de asistencia lingüística. Ervin al [phone].    We comply with applicable federal civil rights laws and [place] laws. We do not discriminate on the basis of race, color, national origin, age, disability sex, sexual orientation or gender  identity.            Thank you!     Thank you for choosing [facility]  for your care. Our goal is always to provide you with excellent care. Hearing back from our patients is one way we can continue to improve our services. Please take a few minutes to complete the written survey that you may receive in the mail after your visit with us. Thank you!             Your Updated Medication List - Protect others around you: Learn how to safely use, store and throw away your medicines at www.disposemymeds.org.          This list is accurate as of: 9/11/17  2:30 PM.  Always use your most recent med list.                   Brand Name Dispense Instructions for use Diagnosis    lacosamide 200 MG Tabs tablet    VIMPAT    90 tablet    Take 1.5 tabs (300mg) by mouth twice daily    Localization-related focal epilepsy with simple partial seizures (H), Rectal cancer (H), Secondary malignant neoplasm of liver (H), Metastasis to brain (H)       levETIRAcetam 1000 MG Tabs     120 tablet    Take 2 tabs (2000mg) by mouth twice daily    Localization-related focal epilepsy with simple partial seizures (H), Rectal cancer (H), Secondary malignant neoplasm of liver (H), Metastasis to brain (H)       LORazepam 0.5 MG tablet    ATIVAN    60 tablet    2 tabs q 8 hrs as needed for seizures    Rectal cancer (H), Secondary malignant neoplasm of liver (H), Metastasis to brain (H), Localization-related focal epilepsy with simple partial seizures (H)       meclizine 25 MG tablet    ANTIVERT     Take 25 mg by mouth as needed        ondansetron 8 MG ODT tab    ZOFRAN-ODT    30 tablet    Take 1 tablet (8 mg) by mouth every 8 hours as needed for nausea    Rectal cancer (H), Secondary malignant neoplasm of liver (H), Metastasis to brain (H), Malignant neoplasm metastatic to lung, unspecified laterality (H)       * PHENobarbital 60 MG tablet    LUMINAL    60 tablet    1 tab at night    Seizure disorder, focal motor (H)       *  PHENobarbital 15 MG tablet    LUMINAL          * Notice:  This list has 2 medication(s) that are the same as other medications prescribed for you. Read the directions carefully, and ask your doctor or other care provider to review them with you.

## 2017-09-20 NOTE — MR AVS SNAPSHOT
After Visit Summary   9/20/2017    Deandre Alex    MRN: [id number]           Patient Information     Date Of Birth          1970        Visit Information        Provider Department      9/20/2017 4:00 PM J Carlos Hannon MD [facility]        Today's Diagnoses     Rectal cancer (H)    -  1    Ostomy nurse consultation           Follow-ups after your visit        Additional Services     WOUND CARE REFERRAL       Your provider has referred you to: [facility] Wound Ostomy - [place] [phone]   [email or web address]    Reason for referral: Ostomy     Please be aware that coverage of these services is subject to the terms and limitations of your health insurance plan.  Call member services at your health plan with any benefit or coverage questions.      Please bring the following with you to your appointment:    (1) Any X-Rays, CTs or MRIs which have been performed.  Contact the facility where they were done to arrange for  prior to your scheduled appointment.    (2) List of current medications   (3) This referral request   (4) Any documents/labs given to you for this referral                  Your next 10 appointments already scheduled     Nov 13, 2017  8:15 AM CST   (Arrive by 8:00 AM)   Return Visit with Willis Ramon MD   [facility] ([facility])    [address] [place] [id number] [phone]              Who to contact     If you have questions or need follow up information about today's clinic visit or your schedule please contact [facility] directly at [phone].  Normal or non-critical lab and imaging results will be communicated to you by MyChart, letter or phone within 4 business days after the clinic has received the results. If you do not hear from us within 7 days, please contact the clinic through  [facility] or phone. If you have a critical or abnormal lab result, we will notify you by phone as soon as possible.  Submit refill requests through [employer] or call your pharmacy and they will forward the refill request to us. Please allow 3 business days for your refill to be completed.          Additional Information About Your Visit        [facility] Information     [employer] gives you secure access to your electronic health record. If you see a primary care provider, you can also send messages to your care team and make appointments. If you have questions, please call your primary care clinic.  If you do not have a primary care provider, please call [phone] and they will assist you.        Care EveryWhere ID     This is your Care EveryWhere ID. This could be used by other organizations to access your [place] medical records  [id number]        Your Vitals Were     Pulse Temperature Respirations Height Pulse Oximetry BMI (Body Mass Index)    67 98.1  F (36.7  C) (Oral) 17 1.829 m (6') 96% 22.92 kg/m2       Blood Pressure from Last 3 Encounters:   09/20/17 130/85   09/11/17 120/79   09/11/17 120/79    Weight from Last 3 Encounters:   09/20/17 76.7 kg (169 lb)   09/11/17 77.1 kg (170 lb)   09/11/17 77.1 kg (170 lb)              We Performed the Following     DNR/DNI     WOUND CARE REFERRAL        Primary Care Provider Office Phone # Fax #    Erasto Richardson [phone]       [address] [place] [address] [address]  [place] [address]        Equal Access to Services     JOY BECKFORD AH: Hadii aad ku hadasho Soomaali, waaxda luqadaha, qaybta kaalmada adeegyada, tru johnson. So [place] [phone].    ATENCIÓN: Si habla español, tiene a kimball disposición servicios gratuitos de asistencia lingüística. Llame al [phone].    We comply with applicable federal civil rights laws and [place] laws. We do not discriminate on the basis of race, color, national origin, age, disability sex, sexual  orientation or gender identity.            Thank you!     Thank you for choosing [facility]  for your care. Our goal is always to provide you with excellent care. Hearing back from our patients is one way we can continue to improve our services. Please take a few minutes to complete the written survey that you may receive in the mail after your visit with us. Thank you!             Your Updated Medication List - Protect others around you: Learn how to safely use, store and throw away your medicines at www.disposemymeds.org.          This list is accurate as of: 9/20/17 11:59 PM.  Always use your most recent med list.                   Brand Name Dispense Instructions for use Diagnosis    lacosamide 200 MG Tabs tablet    VIMPAT    90 tablet    Take 1.5 tabs (300mg) by mouth twice daily    Localization-related focal epilepsy with simple partial seizures (H), Rectal cancer (H), Secondary malignant neoplasm of liver (H), Metastasis to brain (H)       levETIRAcetam 1000 MG Tabs     120 tablet    Take 2 tabs (2000mg) by mouth twice daily    Localization-related focal epilepsy with simple partial seizures (H), Rectal cancer (H), Secondary malignant neoplasm of liver (H), Metastasis to brain (H)       LORazepam 0.5 MG tablet    ATIVAN    60 tablet    2 tabs q 8 hrs as needed for seizures    Rectal cancer (H), Secondary malignant neoplasm of liver (H), Metastasis to brain (H), Localization-related focal epilepsy with simple partial seizures (H)       meclizine 25 MG tablet    ANTIVERT     Take 25 mg by mouth as needed        ondansetron 8 MG ODT tab    ZOFRAN-ODT    30 tablet    Take 1 tablet (8 mg) by mouth every 8 hours as needed for nausea    Rectal cancer (H), Secondary malignant neoplasm of liver (H), Metastasis to brain (H), Malignant neoplasm metastatic to lung, unspecified laterality (H)       oxyCODONE 5 MG IR tablet    ROXICODONE    30 tablet    Take 1 tablet (5 mg) by mouth every 4 hours as  needed for moderate to severe pain    Metastasis to brain (H), Nonintractable episodic headache, unspecified headache type       * PHENobarbital 60 MG tablet    LUMINAL    60 tablet    1 tab at night    Seizure disorder, focal motor (H)       * PHENobarbital 15 MG tablet    LUMINAL          * Notice:  This list has 2 medication(s) that are the same as other medications prescribed for you. Read the directions carefully, and ask your doctor or other care provider to review them with you.

## 2017-09-20 NOTE — PROGRESS NOTES
"Palliative Care Outpatient Clinic Consultation Note    Patient:  Deandre Alex     Underlying Illness: Metastatic rectal cancer    Reason For Consult: Goals of Care    History of Present Illness: Mr. Alex is a 46 y/o man who was diagnosed with metastatic rectal cancer over five years.  Has undergone multiple surgeries - abdominal, lung resections, and multiple craniotomies and neurosurgical procedures - in addition to mulitple lines of chemotherapy.    Currently remains on Avastin in order to try and limit cerebral swelling.  Has simple partial seizures frequently that result in temporary paralysis.  He has lost most of the use of his right arm/hand due to brain mets and has limited use of his right leg as well.      He is meeting with hospice next week to discuss enrollment, but will likely stay on Avastin prohibiting his enrollment.  He realizes he is entering the final stages of his disease and has accepted this.  He and his wife show a very high level of insight into his disease process and situation.    His biggest concern today is what to do with his ostomy bag.  He changes the bag twice weekly, but will need to empty it 7-8 times daily.  This is becoming more and more challenging with the limited use of his right hand.  He does not want to be a burden on his wife or family and realizes at some point he won't be able to do this.      He does not suffer from many physical symptoms.  Has some moderate pain in right arm(wife thought he was underplaying this) that he takes ibuprofen intermittently for.  Will rarely get gallstones attacks for which he takes oxycodone sparingly.  Takes lorazepam in the evenings as this is part of his \"routine\", but does not necessarily have trouble sleeping.     They also had several questions on what to do should Deandre become sick or die in the home.      Review of Systems:  ROS: 10 point ROS neg other than the symptoms noted above in the HPI    Psychological Domain: High level of " coping involving acceptance, positive reframing and humor    Social Domain:  [place]  Has two children, freshman and seniors in high school    Spiritual Domain:Not addressed    Functional Status:   PPS 70%    Goals Of Care:  To lives as long as his quality of life is near where it is at now.  Once this is no longer the case he wants comfort focused measures only.    Life Sustaining Treatments Discussed:  CPR: DNR  Mechanical Intubation: DNI  Dialysis: Not discussed  Feeding Tube/TPN: Not discussed  IV Medications/Fluids: Yes, on case by  Case basis  Re-hospitalization: Yes, on case by case basis    Advance Care Planning:  Advance Directive:  Yes  POLST: Yes, done today               Medications:   I have reviewed this patient's medication profile.  MNPMP: reviewed     Allergies   Allergen Reactions     Nka [No Known Allergies]        Past Medical, Family and Surgical Histories Reviewed    Vital signs:    , Blood pressure 130/85, pulse 67, temperature 98.1  F (36.7  C), temperature source Oral, resp. rate 17, height 1.829 m (6'), weight 76.7 kg (169 lb), SpO2 96 %.  Estimated body mass index is 22.92 kg/(m^2) as calculated from the following:    Height as of this encounter: 1.829 m (6').    Weight as of this encounter: 76.7 kg (169 lb).  General: Middle aged man, sitting in chair comfortably  Eyes: EOMI, sclera clear  HEENT: NC/AT; mucous membranes   Lungs: speaking full sentences  Neuro: A&O x 3; CN II-XII grossly intact;   Neuropsych: Affect appropriate       Data reviewed:  I reviewed recent labs and imaging, comments on pertinent findings:    Assessment and Plan:  46 y/o man with metastatic rectal cancer who has undergone extensive treatments. He is entering the final stages of his illness at this time.  Demonstrates good insight and high level coping mechanisms.  Physical symptoms are not particularly prominent at this time.      Will arrange to have patient meet with [facility] nurse in  order to maximize efficiency of ostomy draining and changes.      POLST form completed today - DNR/DNI.  Educated wife and patient on how to approach different possible scenarios at home.    Will plan on contacting patient next week.  If he has not enrolled in hospice, which he likely will not on Avastin, will arrange for follow-up.  I recommended 2-4 weeks.      Patient seen and discussed with Dr. Shraddha Orozco  Palliative Care Fellow PGY-5    Attending Note:  Patient seen and evaluated with Dr Orozco and I agree with/confirm his findings/recs in this note.   Extensive discussion about end of life planning, hospice, code status. Hospice appropriate apart from Avastin. Discussed timing of stopping Avastin and enrolling in hospice, practical help at home, wife LESLEY, etc. Teenage sons prepared for his death they think.     Thank you for involving us in the patient's care.   Wyatt Llanes MD / Palliative Medicine / Pager [phone] / [facility] Inpatient Team Consult Pager [phone] (answered 8am-430pm M-F) - ok to text page via [employer] / After-Hours Answering Service [phone] / Palliative Clinic in the [facility] at the [facility] - [phone] (scheduling); [phone] (triage).

## 2017-09-20 NOTE — NURSING NOTE
Oncology Rooming Note    September 20, 2017 4:23 PM   Deandre Alex is a 47 year old male who presents for:    Chief Complaint   Patient presents with     Oncology Clinic Visit     return patient visit for follow up related to rectal cancer     Initial Vitals: /85  Pulse 67  Temp 98.1  F (36.7  C) (Oral)  Resp 17  Ht 1.829 m (6')  Wt 76.7 kg (169 lb)  SpO2 96%  BMI 22.92 kg/m2 Estimated body mass index is 22.92 kg/(m^2) as calculated from the following:    Height as of this encounter: 1.829 m (6').    Weight as of this encounter: 76.7 kg (169 lb). Body surface area is 1.97 meters squared.  No Pain (0) Comment: Data Unavailable   No LMP for male patient.  Allergies reviewed: Yes  Medications reviewed: Yes    Medications: Medication refills not needed today.  Pharmacy name entered into [facility]: [id number] - [facility] [address] [place] [address]    Clinical concerns: no concerns dr. chand  was notified.     Unable to complete distress screening questions due to provider running behind.    6 minutes for nursing intake (face to face time)     Citlali Cook CMA

## 2017-09-20 NOTE — LETTER
9/20/2017       RE: Deandre Alex  [address]  [place] [address]     Dear Colleague,    Thank you for referring your patient, Deandre Alex, to the [facility]INIC at [facility]. Please see a copy of my visit note below.    Palliative Care Outpatient Clinic Consultation Note    Patient:  Deandre Alex     Underlying Illness: Metastatic rectal cancer    Reason For Consult: Goals of Care    History of Present Illness: Mr. Alex is a 48 y/o man who was diagnosed with metastatic rectal cancer over five years.  Has undergone multiple surgeries - abdominal, lung resections, and multiple craniotomies and neurosurgical procedures - in addition to mulitple lines of chemotherapy.    Currently remains on Avastin in order to try and limit cerebral swelling.  Has simple partial seizures frequently that result in temporary paralysis.  He has lost most of the use of his right arm/hand due to brain mets and has limited use of his right leg as well.      He is meeting with hospice next week to discuss enrollment, but will likely stay on Avastin prohibiting his enrollment.  He realizes he is entering the final stages of his disease and has accepted this.  He and his wife show a very high level of insight into his disease process and situation.    His biggest concern today is what to do with his ostomy bag.  He changes the bag twice weekly, but will need to empty it 7-8 times daily.  This is becoming more and more challenging with the limited use of his right hand.  He does not want to be a burden on his wife or family and realizes at some point he won't be able to do this.      He does not suffer from many physical symptoms.  Has some moderate pain in right arm(wife thought he was underplaying this) that he takes ibuprofen intermittently for.  Will rarely get gallstones attacks for which he takes oxycodone sparingly.  Takes lorazepam in the evenings as this is part of his  "\"routine\", but does not necessarily have trouble sleeping.     They also had several questions on what to do should Deandre become sick or die in the home.      Review of Systems:  ROS: 10 point ROS neg other than the symptoms noted above in the HPI    Psychological Domain: High level of coping involving acceptance, positive reframing and humor    Social Domain:  [place]  Has two children, freshman and seniors in high school    Spiritual Domain:Not addressed    Functional Status:   PPS 70%    Goals Of Care:  To lives as long as his quality of life is near where it is at now.  Once this is no longer the case he wants comfort focused measures only.    Life Sustaining Treatments Discussed:  CPR: DNR  Mechanical Intubation: DNI  Dialysis: Not discussed  Feeding Tube/TPN: Not discussed  IV Medications/Fluids: Yes, on case by  Case basis  Re-hospitalization: Yes, on case by case basis    Advance Care Planning:  Advance Directive:  Yes  POLST: Yes, done today               Medications:   I have reviewed this patient's medication profile.  MNPMP: reviewed     Allergies   Allergen Reactions     Nka [No Known Allergies]        Past Medical, Family and Surgical Histories Reviewed    Vital signs:    , Blood pressure 130/85, pulse 67, temperature 98.1  F (36.7  C), temperature source Oral, resp. rate 17, height 1.829 m (6'), weight 76.7 kg (169 lb), SpO2 96 %.  Estimated body mass index is 22.92 kg/(m^2) as calculated from the following:    Height as of this encounter: 1.829 m (6').    Weight as of this encounter: 76.7 kg (169 lb).  General: Middle aged man, sitting in chair comfortably  Eyes: EOMI, sclera clear  HEENT: NC/AT; mucous membranes   Lungs: speaking full sentences  Neuro: A&O x 3; CN II-XII grossly intact;   Neuropsych: Affect appropriate       Data reviewed:  I reviewed recent labs and imaging, comments on pertinent findings:    Assessment and Plan:  46 y/o man with metastatic rectal " cancer who has undergone extensive treatments. He is entering the final stages of his illness at this time.  Demonstrates good insight and high level coping mechanisms.  Physical symptoms are not particularly prominent at this time.      Will arrange to have patient meet with [facility] nurse in order to maximize efficiency of ostomy draining and changes.      POLST form completed today - DNR/DNI.  Educated wife and patient on how to approach different possible scenarios at home.    Will plan on contacting patient next week.  If he has not enrolled in hospice, which he likely will not on Avastin, will arrange for follow-up.  I recommended 2-4 weeks.      Patient seen and discussed with Dr. Shraddha Orozco  Palliative Care Fellow PGY-5    Attending Note:  Patient seen and evaluated with Dr Orozco and I agree with/confirm his findings/recs in this note.   Extensive discussion about end of life planning, hospice, code status. Hospice appropriate apart from Avastin. Discussed timing of stopping Avastin and enrolling in hospice, practical help at home, wife LESLEY, etc. Teenage sons prepared for his death they think.     Thank you for involving us in the patient's care.   Wyatt Llanes MD / Palliative Medicine / Pager [phone] / [facility] Inpatient Team Consult Pager [phone] (answered 8am-430pm M-F) - ok to text page via [employer] / After-Hours Answering Service [phone] / Palliative Clinic in the [facility] the [facility] - [phone] (scheduling); [phone] (triage).      Again, thank you for allowing me to participate in the care of your patient.      Sincerely,    J Carlos Hannon MD

## 2017-09-27 NOTE — PROGRESS NOTES
Deandre called stating that he hasn't been contacted by [facility] RN.  Looked and referral was placed on 9/20/17.  Called the wound clinic and LM for them to call back.  Relayed this info to Deandre and let him know I would call him as soon as they get back to me.  He verbalized understanding of the plan of care.

## 2017-09-28 NOTE — PROGRESS NOTES
Form Request Documentation    Date Received in Clinic:  9/21/2017  Name/Type of Form: Attending Physician's Statement (three different forms- one for [employer], one for [employer], one for [employer])  Questions that need to be addressed:   Current Employment Status: not currently working, having last worked on 8/18/2017, on a part time basis.   Date Completed: 9/28/2017  Copy Mailed to patient: Yes on 9/28/2017  Disposition of Form: Fax to [employer] at [phone], Fax to [employer] at [phone], Fax to [employer] at [phone], all on 9/28/2017

## 2017-09-29 NOTE — MR AVS SNAPSHOT
After Visit Summary   9/29/2017    Deandre Alex    MRN: [id number]           Patient Information     Date Of Birth          1970        Visit Information        Provider Department      9/29/2017 12:30 PM Charley Gomez RN [facility] Wound Ostomy        Today's Diagnoses     Ostomy nurse consultation    -  1       Follow-ups after your visit        Your next 10 appointments already scheduled     Nov 13, 2017  8:15 AM CST   (Arrive by 8:00 AM)   Return Visit with Willis Ramon MD   [facility] ([facility] [place])    [address] [place] [address] [phone]              Who to contact     Please call your clinic at [phone] to:    Ask questions about your health    Make or cancel appointments    Discuss your medicines    Learn about your test results    Speak to your doctor   If you have compliments or concerns about an experience at your clinic, or if you wish to file a complaint, please contact [facility] Patient Relations at [phone] or email us at [email or web address]         Additional Information About Your Visit        MyChart Information     [employer] gives you secure access to your electronic health record. If you see a primary care provider, you can also send messages to your care team and make appointments. If you have questions, please call your primary care clinic.  If you do not have a primary care provider, please call [phone] and they will assist you.      [employer] is an electronic gateway that provides easy, online access to your medical records. With [employer], you can request a clinic appointment, read your test results, renew a prescription or communicate with your care team.     To access your existing account, please contact your [facility] Clinic or call [phone] for assistance.        Care EveryWhere ID     This is your  Care EveryWhere ID. This could be used by other organizations to access your [place] medical records  [id number]         Blood Pressure from Last 3 Encounters:   09/20/17 130/85   09/11/17 120/79   09/11/17 120/79    Weight from Last 3 Encounters:   09/20/17 76.7 kg (169 lb)   09/11/17 77.1 kg (170 lb)   09/11/17 77.1 kg (170 lb)              Today, you had the following     No orders found for display       Primary Care Provider Office Phone # Fax #    Erasto Richardson [phone]       [address] [facility] [address]  [place] [address]        Equal Access to Services     JOY BECKFORD : Alfred Garcia, allison gooden, guido kaalmada buck, tru foy . So [facility] [phone].    ATENCIÓN: Si habla español, tiene a kimball disposición servicios gratuitos de asistencia lingüística. Llame al [phone].    We comply with applicable federal civil rights laws and [place] laws. We do not discriminate on the basis of race, color, national origin, age, disability, sex, sexual orientation, or gender identity.            Thank you!     Thank you for choosing [facility] OSTOMY  for your care. Our goal is always to provide you with excellent care. Hearing back from our patients is one way we can continue to improve our services. Please take a few minutes to complete the written survey that you may receive in the mail after your visit with us. Thank you!             Your Updated Medication List - Protect others around you: Learn how to safely use, store and throw away your medicines at www.disposemymeds.org.          This list is accurate as of: 9/29/17 11:59 PM.  Always use your most recent med list.                   Brand Name Dispense Instructions for use Diagnosis    lacosamide 200 MG Tabs tablet    VIMPAT    90 tablet    Take 1.5 tabs (300mg) by mouth twice daily    Localization-related focal epilepsy with simple partial seizures (H), Rectal cancer (H),  Secondary malignant neoplasm of liver (H), Metastasis to brain (H)       levETIRAcetam 1000 MG Tabs     120 tablet    Take 2 tabs (2000mg) by mouth twice daily    Localization-related focal epilepsy with simple partial seizures (H), Rectal cancer (H), Secondary malignant neoplasm of liver (H), Metastasis to brain (H)       LORazepam 0.5 MG tablet    ATIVAN    60 tablet    2 tabs q 8 hrs as needed for seizures    Rectal cancer (H), Secondary malignant neoplasm of liver (H), Metastasis to brain (H), Localization-related focal epilepsy with simple partial seizures (H)       meclizine 25 MG tablet    ANTIVERT     Take 25 mg by mouth as needed        ondansetron 8 MG ODT tab    ZOFRAN-ODT    30 tablet    Take 1 tablet (8 mg) by mouth every 8 hours as needed for nausea    Rectal cancer (H), Secondary malignant neoplasm of liver (H), Metastasis to brain (H), Malignant neoplasm metastatic to lung, unspecified laterality (H)       oxyCODONE 5 MG IR tablet    ROXICODONE    30 tablet    Take 1 tablet (5 mg) by mouth every 4 hours as needed for moderate to severe pain    Metastasis to brain (H), Nonintractable episodic headache, unspecified headache type       * PHENobarbital 60 MG tablet    LUMINAL    60 tablet    1 tab at night    Seizure disorder, focal motor (H)       * PHENobarbital 15 MG tablet    LUMINAL          * Notice:  This list has 2 medication(s) that are the same as other medications prescribed for you. Read the directions carefully, and ask your doctor or other care provider to review them with you.

## 2017-10-06 NOTE — PROGRESS NOTES
"[facility] Ostomy Assessment  Patient comes to clinic for consultation regarding ostomy issues.    Ostomy care is provided by self   Dx related to ostomy:colon cancer  Consulted per Dr Guy Garrison  Subjective:  Patient is complaining of \"due to progression of cancer pt is losing ability to use his right hand. He is wondering if another pouching system might work better \"    Objective: ileostomy  Stoma not evaluated  . Only spoke about a method of easier emptying his pouch. We think this might work with the [place] high output pouches. Pt will get samples to try.     Intervention/Plan:  Spoke with Saad for sample request.     Dr Martino was available for supervision of care if needed or if questions should arise and regarding plan of care. Charley Gomez  RN CWON    "

## 2017-10-09 NOTE — TELEPHONE ENCOUNTER
Metastatic rectal cancer pt c/o R shoulder pain x 3 weeks. Pain has steadily worsened. Using oxycodone 5mg for pain. Would also like to discuss with the palliative care team other alternatives/methods for pain control as well.     Running low on oxycodone and ativan (about 1 day supply left)     Paged DANGELO Calzada, inpatient palliative care nurse

## 2017-10-09 NOTE — TELEPHONE ENCOUNTER
Patient feels that Oxycodone is not effective for his right shoulder pain which has been worsening over the last few days. He is asking for a refill of medication and also asking for other interventions which might help relieve the shoulder pain.       Last refill: 09/11   Last office visit: 09/20  Scheduled for follow up ?     Patient would like script Sent by   to home     [place]  Report reviewed.

## 2017-10-09 NOTE — TELEPHONE ENCOUNTER
Called to inform Deandre and Spouse that they have 4 refills of Ativan available a the [facility] in [place], VM left encouraging them to call me back

## 2017-10-11 NOTE — MR AVS SNAPSHOT
After Visit Summary   10/11/2017    Deandre Alex    MRN: [id number]           Patient Information     Date Of Birth          1970        Visit Information        Provider Department      10/11/2017 5:00 PM  10 ATC;  ONCOLOGY INFUSION [facility]        Today's Diagnoses     Metastasis to brain (H)    -  1    Malignant neoplasm metastatic to lung, unspecified laterality (H)        Rectal cancer (H)        Encounter for long-term (current) use of medications        Nonintractable episodic headache, unspecified headache type          Care Instructions    Contact Numbers  [facility] Nurse Triage: [phone]  After Hours Nurse Line:  [phone]    Please call the [facility] Nurse Triage line or after hours number if you experience a temperature greater than or equal to 100.5, shaking chills, have uncontrolled nausea, vomiting and/or diarrhea, dizziness, shortness of breath, chest pain, bleeding, unexplained bruising, or if you have any other new/concerning symptoms, questions or concerns.     If you are having any concerning symptoms or wish to speak to a provider before your next infusion visit, please call your care coordinator or triage to notify them so we can adequately serve you.     If you need a refill on a narcotic prescription or other medication, please call triage before your infusion appointment.         October 2017 Sunday Monday Tuesday Wednesday Thursday Friday Saturday   1     2     3     4     5     6     7       8     9     10     11     [facility] DRAW    4:30 PM   (15 min.)   [facility]   [facility] Lab Draw     [facility] ONC INFUSION 60    5:00 PM   (60 min.)    ONCOLOGY INFUSION   [facility] 12     13     14       15     16     17     18     19     20     21       22     23     24     25     26     27     28       29     30     31                                    November 2017 Sunday Monday Tuesday  Wednesday Thursday Friday Saturday                  1     2     3     4       5     6     7     8     9     10     11       12     13     [facility] RETURN    8:00 AM   (30 min.)   Willis Ramon MD   [facility] 14     15     16     17     18       19     20     21     22     23     24     25       26     27     28     29     30                            Lab Results:  Recent Results (from the past 12 hour(s))   *CBC with platelets differential    Collection Time: 10/11/17  4:45 PM   Result Value Ref Range    WBC 8.2 4.0 - 11.0 10e9/L    RBC Count 4.20 (L) 4.4 - 5.9 10e12/L    Hemoglobin 14.1 13.3 - 17.7 g/dL    Hematocrit 40.7 40.0 - 53.0 %    MCV 97 78 - 100 fl    MCH 33.6 (H) 26.5 - 33.0 pg    MCHC 34.6 31.5 - 36.5 g/dL    RDW 12.9 10.0 - 15.0 %    Platelet Count 165 150 - 450 10e9/L    Diff Method Automated Method     % Neutrophils 74.6 %    % Lymphocytes 10.3 %    % Monocytes 6.6 %    % Eosinophils 7.8 %    % Basophils 0.5 %    % Immature Granulocytes 0.2 %    Nucleated RBCs 0 0 /100    Absolute Neutrophil 6.1 1.6 - 8.3 10e9/L    Absolute Lymphocytes 0.8 0.8 - 5.3 10e9/L    Absolute Monocytes 0.5 0.0 - 1.3 10e9/L    Absolute Eosinophils 0.6 0.0 - 0.7 10e9/L    Absolute Basophils 0.0 0.0 - 0.2 10e9/L    Abs Immature Granulocytes 0.0 0 - 0.4 10e9/L    Absolute Nucleated RBC 0.0    Comprehensive metabolic panel    Collection Time: 10/11/17  4:45 PM   Result Value Ref Range    Sodium 138 133 - 144 mmol/L    Potassium 3.8 3.4 - 5.3 mmol/L    Chloride 103 94 - 109 mmol/L    Carbon Dioxide 29 20 - 32 mmol/L    Anion Gap 6 3 - 14 mmol/L    Glucose 90 70 - 99 mg/dL    Urea Nitrogen 13 7 - 30 mg/dL    Creatinine 0.81 0.66 - 1.25 mg/dL    GFR Estimate >90 >60 mL/min/1.7m2    GFR Estimate If Black >90 >60 mL/min/1.7m2    Calcium 9.1 8.5 - 10.1 mg/dL    Bilirubin Total 0.4 0.2 - 1.3 mg/dL    Albumin 3.4 3.4 - 5.0 g/dL    Protein Total 7.4 6.8 - 8.8 g/dL    Alkaline Phosphatase 149 40 - 150 U/L    ALT  40 0 - 70 U/L    AST 39 0 - 45 U/L   Protein qualitative urine    Collection Time: 10/11/17  5:10 PM   Result Value Ref Range    Protein Albumin Urine 10 (A) NEG^Negative mg/dL               Follow-ups after your visit        Your next 10 appointments already scheduled     Nov 13, 2017  8:15 AM CST   (Arrive by 8:00 AM)   Return Visit with Willis Ramon MD   [facility] ([facility] [place])    [address] [facility] [address] [phone]              Who to contact     If you have questions or need follow up information about today's clinic visit or your schedule please contact [facility] directly at [phone].  Normal or non-critical lab and imaging results will be communicated to you by MyChart, letter or phone within 4 business days after the clinic has received the results. If you do not hear from us within 7 days, please contact the clinic through [employer] or phone. If you have a critical or abnormal lab result, we will notify you by phone as soon as possible.  Submit refill requests through [employer] or call your pharmacy and they will forward the refill request to us. Please allow 3 business days for your refill to be completed.          Additional Information About Your Visit        [employer] Information     [employer] gives you secure access to your electronic health record. If you see a primary care provider, you can also send messages to your care team and make appointments. If you have questions, please call your primary care clinic.  If you do not have a primary care provider, please call [phone] and they will assist you.        Care EveryWhere ID     This is your Care EveryWhere ID. This could be used by other organizations to access your [place] medical records  [id number]        Your Vitals Were     Pulse Temperature Respirations Pulse Oximetry BMI (Body Mass Index)       92 97.8  F (36.6  C) (Oral) 16  98% 23.91 kg/m2        Blood Pressure from Last 3 Encounters:   10/11/17 (!) 141/94   09/20/17 130/85   09/11/17 120/79    Weight from Last 3 Encounters:   10/11/17 80 kg (176 lb 4.8 oz)   09/20/17 76.7 kg (169 lb)   09/11/17 77.1 kg (170 lb)              We Performed the Following     *CBC with platelets differential     Comprehensive metabolic panel     Protein qualitative urine          Today's Medication Changes          These changes are accurate as of: 10/11/17  6:14 PM.  If you have any questions, ask your nurse or doctor.               Start taking these medicines.        Dose/Directions    oxyCODONE 5 MG IR tablet   Commonly known as:  ROXICODONE   Used for:  Metastasis to brain (H), Nonintractable episodic headache, unspecified headache type        Dose:  5-10 mg   Take 1-2 tablets (5-10 mg) by mouth every 4 hours as needed for moderate to severe pain   Quantity:  180 tablet   Refills:  0            Where to get your medicines      Some of these will need a paper prescription and others can be bought over the counter.  Ask your nurse if you have questions.     Bring a paper prescription for each of these medications     oxyCODONE 5 MG IR tablet                Primary Care Provider Office Phone # Fax #    Erasto Richardson [phone]       [address] [place] [address]        Equal Access to Services     JOY BECKFORD AH: Alfred corraleso Sokarine, waaxda luqadaha, qaybta kaalyaneth jane, tru johnson. So [facility] [phone].    ATENCIÓN: Si habla español, tiene a kimball disposición servicios gratuitos de asistencia lingüística. Llame al [phone].    We comply with applicable federal civil rights laws and [place] laws. We do not discriminate on the basis of race, color, national origin, age, disability, sex, sexual orientation, or gender identity.            Thank you!     Thank you for choosing [facility]  for your care.  Our goal is always to provide you with excellent care. Hearing back from our patients is one way we can continue to improve our services. Please take a few minutes to complete the written survey that you may receive in the mail after your visit with us. Thank you!             Your Updated Medication List - Protect others around you: Learn how to safely use, store and throw away your medicines at www.disposemymeds.org.          This list is accurate as of: 10/11/17  6:14 PM.  Always use your most recent med list.                   Brand Name Dispense Instructions for use Diagnosis    lacosamide 200 MG Tabs tablet    VIMPAT    90 tablet    Take 1.5 tabs (300mg) by mouth twice daily    Localization-related focal epilepsy with simple partial seizures (H), Rectal cancer (H), Secondary malignant neoplasm of liver (H), Metastasis to brain (H)       levETIRAcetam 1000 MG Tabs     120 tablet    Take 2 tabs (2000mg) by mouth twice daily    Localization-related focal epilepsy with simple partial seizures (H), Rectal cancer (H), Secondary malignant neoplasm of liver (H), Metastasis to brain (H)       LORazepam 0.5 MG tablet    ATIVAN    60 tablet    2 tabs q 8 hrs as needed for seizures    Rectal cancer (H), Secondary malignant neoplasm of liver (H), Metastasis to brain (H), Localization-related focal epilepsy with simple partial seizures (H)       meclizine 25 MG tablet    ANTIVERT     Take 25 mg by mouth as needed        ondansetron 8 MG ODT tab    ZOFRAN-ODT    30 tablet    Take 1 tablet (8 mg) by mouth every 8 hours as needed for nausea    Rectal cancer (H), Secondary malignant neoplasm of liver (H), Metastasis to brain (H), Malignant neoplasm metastatic to lung, unspecified laterality (H)       oxyCODONE 5 MG IR tablet    ROXICODONE    180 tablet    Take 1-2 tablets (5-10 mg) by mouth every 4 hours as needed for moderate to severe pain    Metastasis to brain (H), Nonintractable episodic headache, unspecified headache type        * PHENobarbital 60 MG tablet    LUMINAL    60 tablet    1 tab at night    Seizure disorder, focal motor (H)       * PHENobarbital 15 MG tablet    LUMINAL          * Notice:  This list has 2 medication(s) that are the same as other medications prescribed for you. Read the directions carefully, and ask your doctor or other care provider to review them with you.

## 2017-10-11 NOTE — TELEPHONE ENCOUNTER
With loss of function of his R arm he is waking up at night feeling like the arm is being dislocated, stretched, in terrible pain.    He does not actually think it is being dislocated. Bought an OTC sling.    Taking oxycodone, 1 tab q6h; going ok    I think a mechanical solution is probably better than escalating meds at this point. Asking our sports med clinic to eval him.

## 2017-10-11 NOTE — PATIENT INSTRUCTIONS
Contact Numbers  [facility] Nurse Triage: [phone]  After Hours Nurse Line:  [phone]    Please call the [facility] Nurse Triage line or after hours number if you experience a temperature greater than or equal to 100.5, shaking chills, have uncontrolled nausea, vomiting and/or diarrhea, dizziness, shortness of breath, chest pain, bleeding, unexplained bruising, or if you have any other new/concerning symptoms, questions or concerns.     If you are having any concerning symptoms or wish to speak to a provider before your next infusion visit, please call your care coordinator or triage to notify them so we can adequately serve you.     If you need a refill on a narcotic prescription or other medication, please call triage before your infusion appointment.         October 2017 Sunday Monday Tuesday Wednesday Thursday Friday Saturday   1     2     3     4     5     6     7       8     9     10     11     [facility]    4:30 PM   (15 min.)    [facility] DRAW   [facility] Lab Draw     [facility] ONC INFUSION 60    5:00 PM   (60 min.)    ONCOLOGY INFUSION   [facility] 12     13     14       15     16     17     18     19     20     21       22     23     24     25     26     27     28       29     30     31 November 2017 Sunday Monday Tuesday Wednesday Thursday Friday Saturday                  1     2     3     4       5     6     7     8     9     10     11       12     13     [facility] RETURN    8:00 AM   (30 min.)   Willis Ramon MD   [facility] 14     15     16     17     18       19     20     21     22     23     24     25       26     27     28     29     30                            Lab Results:  Recent Results (from the past 12 hour(s))   *CBC with platelets differential    Collection Time: 10/11/17  4:45 PM   Result Value Ref Range    WBC 8.2 4.0 - 11.0 10e9/L    RBC Count 4.20 (L) 4.4 - 5.9 10e12/L     Hemoglobin 14.1 13.3 - 17.7 g/dL    Hematocrit 40.7 40.0 - 53.0 %    MCV 97 78 - 100 fl    MCH 33.6 (H) 26.5 - 33.0 pg    MCHC 34.6 31.5 - 36.5 g/dL    RDW 12.9 10.0 - 15.0 %    Platelet Count 165 150 - 450 10e9/L    Diff Method Automated Method     % Neutrophils 74.6 %    % Lymphocytes 10.3 %    % Monocytes 6.6 %    % Eosinophils 7.8 %    % Basophils 0.5 %    % Immature Granulocytes 0.2 %    Nucleated RBCs 0 0 /100    Absolute Neutrophil 6.1 1.6 - 8.3 10e9/L    Absolute Lymphocytes 0.8 0.8 - 5.3 10e9/L    Absolute Monocytes 0.5 0.0 - 1.3 10e9/L    Absolute Eosinophils 0.6 0.0 - 0.7 10e9/L    Absolute Basophils 0.0 0.0 - 0.2 10e9/L    Abs Immature Granulocytes 0.0 0 - 0.4 10e9/L    Absolute Nucleated RBC 0.0    Comprehensive metabolic panel    Collection Time: 10/11/17  4:45 PM   Result Value Ref Range    Sodium 138 133 - 144 mmol/L    Potassium 3.8 3.4 - 5.3 mmol/L    Chloride 103 94 - 109 mmol/L    Carbon Dioxide 29 20 - 32 mmol/L    Anion Gap 6 3 - 14 mmol/L    Glucose 90 70 - 99 mg/dL    Urea Nitrogen 13 7 - 30 mg/dL    Creatinine 0.81 0.66 - 1.25 mg/dL    GFR Estimate >90 >60 mL/min/1.7m2    GFR Estimate If Black >90 >60 mL/min/1.7m2    Calcium 9.1 8.5 - 10.1 mg/dL    Bilirubin Total 0.4 0.2 - 1.3 mg/dL    Albumin 3.4 3.4 - 5.0 g/dL    Protein Total 7.4 6.8 - 8.8 g/dL    Alkaline Phosphatase 149 40 - 150 U/L    ALT 40 0 - 70 U/L    AST 39 0 - 45 U/L   Protein qualitative urine    Collection Time: 10/11/17  5:10 PM   Result Value Ref Range    Protein Albumin Urine 10 (A) NEG^Negative mg/dL

## 2017-10-11 NOTE — PROGRESS NOTES
Infusion Nursing Note:  Deandre Alex presents today for Cycle 3 Avastin.    Patient seen by provider today: No   present during visit today: Not Applicable.    Note: Pt states he has been taking Oxycodone 5 mg every 6 hours for ongoing and slightly worsening shoulder pain. He would like a refill today.  Discussed with Dr. Ramon who gave the OK to refill script, but unfortunately MD not in clinic. Palliative care MD notified and wrote script for Oxycodone and filled today.  Pt will be set up to see sports medicine to help with pain management in shoulder.    Dr. Ramon also aware of elevated BP. OK to give Avastin.    Intravenous Access:  Implanted Port.    Treatment Conditions:  Lab Results   Component Value Date    HGB 14.1 10/11/2017     Lab Results   Component Value Date    WBC 8.2 10/11/2017      Lab Results   Component Value Date    ANEU 6.1 10/11/2017     Lab Results   Component Value Date     10/11/2017      Lab Results   Component Value Date     10/11/2017                   Lab Results   Component Value Date    POTASSIUM 3.8 10/11/2017           Lab Results   Component Value Date    MAG 2.2 03/04/2015            Lab Results   Component Value Date    CR 0.81 10/11/2017                   Lab Results   Component Value Date    FAYE 9.1 10/11/2017                Lab Results   Component Value Date    BILITOTAL 0.4 10/11/2017           Lab Results   Component Value Date    ALBUMIN 3.4 10/11/2017                    Lab Results   Component Value Date    ALT 40 10/11/2017           Lab Results   Component Value Date    AST 39 10/11/2017     Results reviewed, labs MET treatment parameters, ok to proceed with treatment.  Urine Protein: 10.  BP: 141/94. Yenny aware; OK to proceed.    Post Infusion Assessment:  Patient tolerated infusion without incident.  Blood return noted pre and post infusion.  Site patent and intact, free from redness, edema or discomfort.  No evidence of extravasations.  Access discontinued per protocol.    Discharge Plan:   Prescription refills given for Oxycodone.  Copy of AVS reviewed with patient and/or family.  Patient will call scheduling if he'd like to schedule another Avastin. This treatment is currently palliative and he may go to hospice.  Patient discharged in stable condition accompanied by: mother-in-law.  Departure Mode: Ambulatory.  Face to Face time: 3 minutes.    Hyun Correa RN

## 2017-10-11 NOTE — NURSING NOTE
"Chief Complaint   Patient presents with     Port Draw     port accessed and labs drawn by rn.  vs taken.     Port accessed with 20g 3/4\" gripper needle, labs drawn, port flushed with saline and heparin, vitals checked, pt checked in for next appointment.  Vivian Soares RN    "

## 2017-10-11 NOTE — TELEPHONE ENCOUNTER
Received VM from patient's wife requesting call back to discuss patient increased shoulder pain. She states that she spoke with Brian earlier this week about this as well. Attempted to call her back, but was unable to reach her. Left VM requesting call back when she is able.

## 2017-11-17 NOTE — PROGRESS NOTES
Ortho Nursing home visit    Deandre Alex is a 47 year old male who resides at Home; now in hospice, 2nd to [facility], ref; From Dr Mason;    Patient is seen today for Right shoulder pain; question of mech; subluxation vs. Central pain from Mets;      Past Medical History:   Diagnosis Date     Hand foot syndrome      Lipidoses      Patient on combined chemotherapy and radiation      Psoriasis      Rectal cancer (H)      Secondary malignant neoplasm of liver (H)      Seizures (H)       Past Surgical History:   Procedure Laterality Date     APPENDECTOMY  2008     APPENDECTOMY       BIOPSY RECTUM  6/26/2014    Procedure: BIOPSY RECTUM;  Surgeon: Rolando Garrison MD;  Location: [facility]     BRONCHOSCOPY FLEXIBLE  1/30/2014    Procedure: BRONCHOSCOPY FLEXIBLE;;  Surgeon: Aden Hays MD;  Location: [facility]     COLECTOMY LOW ANTERIOR  4/24/2013    Procedure: COLECTOMY LOW ANTERIOR;  Exploratory Laparotomy, Intraoperative Ultrasound,        COLONOSCOPY  1/9/2014    Procedure: COLONOSCOPY;;  Surgeon: Rolando Garrison MD;  Location: [facility]     DILATE RECTUM  1/9/2014    Procedure: DILATE RECTUM;;  Surgeon: Rolando Garrison MD;  [facility]     DILATE RECTUM  6/26/2014    Procedure: DILATE RECTUM;  Surgeon: Rolando Garrison MD;  [facility]     EXAM UNDER ANESTHESIA ANUS  1/9/2014    Procedure: EXAM UNDER ANESTHESIA ANUS;  Rectal Examination Under Anesthesia, Flexible Coloscopy, Rectal Balloon Dilation;  Surgeon: Rolando Garrison MD;  [facility]     HEPATECTOMY PARTIAL  4/24/2013    Procedure: HEPATECTOMY PARTIAL;;  Surgeon: Tristian Álvarez MD;  [facility]     ILEOSTOMY  4/24/2013    Procedure: ILEOSTOMY;;  Surgeon: Rolando Garrison MD;  [facility]     OPTICAL TRACKING SYSTEM CRANIOTOMY, EXCISE TUMOR WITH MAPPING, COMBINED Left 11/5/2014    Procedure: COMBINED OPTICAL TRACKING SYSTEM CRANIOTOMY, EXCISE TUMOR WITH MAPPING;  Surgeon: Jewel Blanchard MD;  [facility]     OPTICAL TRACKING SYSTEM  CRANIOTOMY, EXCISE TUMOR, COMBINED Left 3/3/2015    Procedure: COMBINED OPTICAL TRACKING SYSTEM CRANIOTOMY, EXCISE TUMOR;  Surgeon: Jewel Blanchard MD;  [facility]     RESECTION ABDOMINAL PERINEAL  4/24/2013    Procedure: RESECTION ABDOMINAL PERINEAL;;  Surgeon: Rolando Garrison MD;  [facility]     THORACOSCOPIC RESECTION LUNG  1/30/2014    Procedure: THORACOSCOPIC RESECTION LUNG;  Left Thoracoscopic Lingulectomy, Flexible Bronchoscopy  ;  Surgeon: Aden Hays MD;  [facility]     THORACOSCOPIC WEDGE RESECTION LUNG  6/26/2014    Procedure: THORACOSCOPIC WEDGE RESECTION LUNG;  Surgeon: Aden Hays MD;  Location: [facility]        Allergies   Allergen Reactions     Nka [No Known Allergies]       There were no vitals taken for this visit.     Exam: Patient today was ambulating from bathroom and had witnessed fall from a standing height, exam on floor indicated no recent trauma assc; with witnessed fall, patient was able to rise asst; to chair, exam of shoulder, indicated, mech subluxation of GH joint; pain with PROM , contracture of hand/ fingers also assc; with hemiparesis involving right UE;        X-rays not done today; however discussed with patient and wife, and they deferred for now:    ASSESSMENT / PLAN:  I did inject the shoulder, sub/acromial today for both pain relief, and diag; of shoulder, vs central pathology from Brain mets; to determine if pain is mech; vs neuro in nature ; patient will keep track of symptoms, and discuss with me findings, also encouraged to start Neurontin today, if no relief from injection; I have given patient a resting pad to be worn to facilitate support of the right arm, as he has not been satisfied with attempts to use sling;    Patient and wife have my contact #; and I will be happy to assist in his care; I spent 30 minutes today discussion around safety issues throughout the house, as patient is at risk for future falls, as seen today in  his home;           Haroon [facility]  [phone]

## 2017-11-29 NOTE — TELEPHONE ENCOUNTER
----- Message from Shan Martínez RN sent at 11/29/2017  2:42 PM CST -----  Regarding: FW: Nerve block questions  Contact: [phone]      ----- Message -----     From: Alpa Aguiar     Sent: 11/29/2017   2:36 PM       To: Adult Chronic Pain Nurses-[facility]  Subject: Nerve block questions                            Pt's wife Sunita called. States that pt has been referred to Dr. Martinez for nerve block procedure. They have questions as to what this procedure entails before they schedule. Please call them at [phone].    Thank you,  Alpa BISHOP

## 2017-12-05 NOTE — PROGRESS NOTES
This is a recent snapshot of the patient's [facility] medical record.  For current drug dose and complete information and questions, call [phone] or In Basket pool, [facility] home infusion ([id number])  CSN Number:  [id number]

## 2017-12-05 NOTE — TELEPHONE ENCOUNTER
Dr. Martinez recommended pt have right suprascapular nerve block.  RNCC spoke with pt's wife, Sunita, to offer injection procedure time 12/8/17 at 1130.  Pt's wife declined to schedule at this time.     Lauren Balderas, RN, BSN

## 2017-12-06 NOTE — PROGRESS NOTES
This is a recent snapshot of the patient's [facility] medical record.  For current drug dose and complete information and questions, call [phone] or In Basket pool, [facility] home infusion ([id number])  CSN Number:  [id number]

## 2017-12-11 ENCOUNTER — HOME INFUSION (PRE-WILLOW HOME INFUSION) (OUTPATIENT)
Dept: PHARMACY | Facility: CLINIC | Age: 47
End: 2017-12-11

## 2018-01-26 NOTE — PROGRESS NOTES
This is a recent snapshot of the patient's [facility] medical record.  For current drug dose and complete information and questions, call [phone] or In Basket pool, [facility] home infusion ([id number])  CSN Number:  [id number]

## 2018-08-28 NOTE — TELEPHONE ENCOUNTER
Problem: Depressive Behavior With or Without Suicide Precautions:  Goal: Able to verbalize acceptance of life and situations over which he or she has no control  Able to verbalize acceptance of life and situations over which he or she has no control   Outcome: Ongoing      Comments: Patient resting quietly in bed with eyes closed. Respirations even and unlabored with no signs of distress observed. Environmental rounds continued. [facility] Prior Authorization Team   Phone: [phone]  Fax: [phone]    PA Initiation    Medication: Lacosamide 200 mg  Insurance Company: [employer] - Phone [phone] Fax [phone]  Pharmacy Filling the Rx: [id number] - [facility] [address] [place] [address]  Filling Pharmacy Phone: [phone]  Filling Pharmacy Fax: [phone]  Start Date: 8/25/2017

## 2022-02-17 NOTE — PROGRESS NOTES
SUBJECTIVE:  Mr. Deandre Alex is 46 years old with a large metastases on the left hemisphere and near the motor cortex for which he has had 2 surgical procedures.  This is metastatic adenocarcinoma.  His seizures were carrying a frequency of 3-4 per month and there were focal motor types and they continue to be focal motor in the arm and occasionally in the leg but now they are occurring 10-12 times a month over the last couple of months.  He has been on Levetiracetam on a very good dose/level relationship, 2000 twice a day, and he has also been on a locked lacosamide (Vimpat) 200 twice a day.  Both levels have been very high and good, but he continues to have seizures.      The lesion in the left hemisphere has apparently no further treatment options left.  He is on Avastin.  He was told he has 6 months to live.  He does have 1 metastases to the lung and apparently very few in the liver.      Currently, he is a little bit despondent, although accepting.  He does want to burden his family.        OBJECTIVE:  On his exam, he shows blood pressure 119/80, pulse 79, weight is 172 pounds.  He does have pretty severe weakness of the hand, proximally is okay.  The leg has a brace and that there is not much proximal weakness.      ASSESSMENT AND PLAN:  In summary, crescendo focal motor or partial seizures due to increase of the left hemisphere tumor.  I think phenobarbital with a slow escalation program will work better as therapy so he will be started on 60 mg at bedtime and a level in 2 weeks of all 3 drugs and then we will probably increase it.  He is using lorazepam for anxiety as well as seizure control.  He is encouraged to seek a second opinion in [place] for management of the tumor and we will be trying to help him through Dr. Simpson there.        MD SABRINA Shaikh MD             D: 08/11/2017 10:20   T: 08/11/2017 11:35   MT: tb      Name:     DEANDRE ALEX   MRN:      [id number]         Account:      [id number]   :      1970           Service Date: 2017      Document: [id number]     [id number]